# Patient Record
Sex: FEMALE | Race: BLACK OR AFRICAN AMERICAN | NOT HISPANIC OR LATINO
[De-identification: names, ages, dates, MRNs, and addresses within clinical notes are randomized per-mention and may not be internally consistent; named-entity substitution may affect disease eponyms.]

---

## 2017-01-11 ENCOUNTER — APPOINTMENT (OUTPATIENT)
Dept: INTERNAL MEDICINE | Facility: CLINIC | Age: 82
End: 2017-01-11

## 2017-01-19 ENCOUNTER — APPOINTMENT (OUTPATIENT)
Dept: OTOLARYNGOLOGY | Facility: CLINIC | Age: 82
End: 2017-01-19

## 2017-01-19 VITALS
BODY MASS INDEX: 24.25 KG/M2 | HEIGHT: 60 IN | WEIGHT: 123.5 LBS | HEART RATE: 76 BPM | SYSTOLIC BLOOD PRESSURE: 149 MMHG | DIASTOLIC BLOOD PRESSURE: 74 MMHG

## 2017-01-19 DIAGNOSIS — J31.0 CHRONIC RHINITIS: ICD-10-CM

## 2017-01-27 ENCOUNTER — APPOINTMENT (OUTPATIENT)
Dept: INTERNAL MEDICINE | Facility: CLINIC | Age: 82
End: 2017-01-27

## 2017-02-10 ENCOUNTER — MEDICATION RENEWAL (OUTPATIENT)
Age: 82
End: 2017-02-10

## 2017-03-06 ENCOUNTER — APPOINTMENT (OUTPATIENT)
Dept: INTERNAL MEDICINE | Facility: CLINIC | Age: 82
End: 2017-03-06

## 2017-03-15 ENCOUNTER — MEDICATION RENEWAL (OUTPATIENT)
Age: 82
End: 2017-03-15

## 2017-04-12 ENCOUNTER — MEDICATION RENEWAL (OUTPATIENT)
Age: 82
End: 2017-04-12

## 2017-05-08 ENCOUNTER — MEDICATION RENEWAL (OUTPATIENT)
Age: 82
End: 2017-05-08

## 2017-05-31 ENCOUNTER — APPOINTMENT (OUTPATIENT)
Dept: INTERNAL MEDICINE | Facility: CLINIC | Age: 82
End: 2017-05-31

## 2017-05-31 DIAGNOSIS — M31.6 OTHER GIANT CELL ARTERITIS: ICD-10-CM

## 2017-06-06 ENCOUNTER — MEDICATION RENEWAL (OUTPATIENT)
Age: 82
End: 2017-06-06

## 2017-07-05 ENCOUNTER — APPOINTMENT (OUTPATIENT)
Dept: INTERNAL MEDICINE | Facility: CLINIC | Age: 82
End: 2017-07-05

## 2017-07-05 ENCOUNTER — MEDICATION RENEWAL (OUTPATIENT)
Age: 82
End: 2017-07-05

## 2017-07-14 ENCOUNTER — MEDICATION RENEWAL (OUTPATIENT)
Age: 82
End: 2017-07-14

## 2017-08-11 ENCOUNTER — MEDICATION RENEWAL (OUTPATIENT)
Age: 82
End: 2017-08-11

## 2017-09-11 ENCOUNTER — RX RENEWAL (OUTPATIENT)
Age: 82
End: 2017-09-11

## 2017-09-11 ENCOUNTER — MEDICATION RENEWAL (OUTPATIENT)
Age: 82
End: 2017-09-11

## 2017-09-12 ENCOUNTER — RX RENEWAL (OUTPATIENT)
Age: 82
End: 2017-09-12

## 2017-09-27 ENCOUNTER — LABORATORY RESULT (OUTPATIENT)
Age: 82
End: 2017-09-27

## 2017-09-27 ENCOUNTER — APPOINTMENT (OUTPATIENT)
Dept: INTERNAL MEDICINE | Facility: CLINIC | Age: 82
End: 2017-09-27
Payer: MEDICARE

## 2017-09-27 VITALS — BODY MASS INDEX: 24.94 KG/M2 | HEIGHT: 60 IN | WEIGHT: 127 LBS

## 2017-09-27 VITALS — DIASTOLIC BLOOD PRESSURE: 80 MMHG | SYSTOLIC BLOOD PRESSURE: 138 MMHG

## 2017-09-27 PROCEDURE — 99214 OFFICE O/P EST MOD 30 MIN: CPT | Mod: 25

## 2017-09-27 PROCEDURE — 36415 COLL VENOUS BLD VENIPUNCTURE: CPT

## 2017-09-28 LAB
25(OH)D3 SERPL-MCNC: 41.4 NG/ML
ALBUMIN SERPL ELPH-MCNC: 4.2 G/DL
ALP BLD-CCNC: 54 U/L
ALT SERPL-CCNC: 13 U/L
ANION GAP SERPL CALC-SCNC: 18 MMOL/L
AST SERPL-CCNC: 28 U/L
BASOPHILS # BLD AUTO: 0.02 K/UL
BASOPHILS NFR BLD AUTO: 0.3 %
BILIRUB SERPL-MCNC: 0.3 MG/DL
BUN SERPL-MCNC: 23 MG/DL
CALCIUM SERPL-MCNC: 9.5 MG/DL
CHLORIDE SERPL-SCNC: 99 MMOL/L
CHOLEST SERPL-MCNC: 166 MG/DL
CHOLEST/HDLC SERPL: 2.1 RATIO
CO2 SERPL-SCNC: 24 MMOL/L
CREAT SERPL-MCNC: 0.89 MG/DL
EOSINOPHIL # BLD AUTO: 0.11 K/UL
EOSINOPHIL NFR BLD AUTO: 1.6 %
GLUCOSE SERPL-MCNC: 92 MG/DL
HBA1C MFR BLD HPLC: 5.5 %
HCT VFR BLD CALC: 39.9 %
HDLC SERPL-MCNC: 79 MG/DL
HGB BLD-MCNC: 12.7 G/DL
IMM GRANULOCYTES NFR BLD AUTO: 0.1 %
LDLC SERPL CALC-MCNC: 68 MG/DL
LYMPHOCYTES # BLD AUTO: 1.5 K/UL
LYMPHOCYTES NFR BLD AUTO: 21.7 %
MAN DIFF?: NORMAL
MCHC RBC-ENTMCNC: 30.4 PG
MCHC RBC-ENTMCNC: 31.8 GM/DL
MCV RBC AUTO: 95.5 FL
MONOCYTES # BLD AUTO: 0.56 K/UL
MONOCYTES NFR BLD AUTO: 8.1 %
NEUTROPHILS # BLD AUTO: 4.72 K/UL
NEUTROPHILS NFR BLD AUTO: 68.2 %
PLATELET # BLD AUTO: 226 K/UL
POTASSIUM SERPL-SCNC: 4.2 MMOL/L
PROT SERPL-MCNC: 7.4 G/DL
RBC # BLD: 4.18 M/UL
RBC # FLD: 14.7 %
SAVE SPECIMEN: NORMAL
SODIUM SERPL-SCNC: 141 MMOL/L
T3RU NFR SERPL: 1.09 INDEX
T4 SERPL-MCNC: 6 UG/DL
TRIGL SERPL-MCNC: 97 MG/DL
TSH SERPL-ACNC: 4.34 UIU/ML
URATE SERPL-MCNC: 4.2 MG/DL
VIT B12 SERPL-MCNC: 434 PG/ML
WBC # FLD AUTO: 6.92 K/UL

## 2017-10-09 ENCOUNTER — MEDICATION RENEWAL (OUTPATIENT)
Age: 82
End: 2017-10-09

## 2017-10-13 ENCOUNTER — MEDICATION RENEWAL (OUTPATIENT)
Age: 82
End: 2017-10-13

## 2017-11-01 ENCOUNTER — APPOINTMENT (OUTPATIENT)
Dept: INTERNAL MEDICINE | Facility: CLINIC | Age: 82
End: 2017-11-01

## 2017-11-07 ENCOUNTER — MEDICATION RENEWAL (OUTPATIENT)
Age: 82
End: 2017-11-07

## 2017-11-07 ENCOUNTER — APPOINTMENT (OUTPATIENT)
Dept: INTERNAL MEDICINE | Facility: CLINIC | Age: 82
End: 2017-11-07
Payer: MEDICARE

## 2017-11-07 VITALS
DIASTOLIC BLOOD PRESSURE: 80 MMHG | HEIGHT: 60 IN | SYSTOLIC BLOOD PRESSURE: 138 MMHG | WEIGHT: 129 LBS | BODY MASS INDEX: 25.32 KG/M2

## 2017-11-07 VITALS — SYSTOLIC BLOOD PRESSURE: 138 MMHG | DIASTOLIC BLOOD PRESSURE: 88 MMHG

## 2017-11-07 PROCEDURE — 99214 OFFICE O/P EST MOD 30 MIN: CPT | Mod: 25

## 2017-11-07 PROCEDURE — G0008: CPT

## 2017-11-07 PROCEDURE — 90686 IIV4 VACC NO PRSV 0.5 ML IM: CPT

## 2017-12-15 ENCOUNTER — MEDICATION RENEWAL (OUTPATIENT)
Age: 82
End: 2017-12-15

## 2018-01-19 ENCOUNTER — MEDICATION RENEWAL (OUTPATIENT)
Age: 83
End: 2018-01-19

## 2018-02-20 ENCOUNTER — MEDICATION RENEWAL (OUTPATIENT)
Age: 83
End: 2018-02-20

## 2018-03-16 ENCOUNTER — MEDICATION RENEWAL (OUTPATIENT)
Age: 83
End: 2018-03-16

## 2018-05-15 ENCOUNTER — MEDICATION RENEWAL (OUTPATIENT)
Age: 83
End: 2018-05-15

## 2018-06-14 ENCOUNTER — MEDICATION RENEWAL (OUTPATIENT)
Age: 83
End: 2018-06-14

## 2018-06-15 ENCOUNTER — MEDICATION RENEWAL (OUTPATIENT)
Age: 83
End: 2018-06-15

## 2018-06-26 ENCOUNTER — MEDICATION RENEWAL (OUTPATIENT)
Age: 83
End: 2018-06-26

## 2018-07-23 ENCOUNTER — MEDICATION RENEWAL (OUTPATIENT)
Age: 83
End: 2018-07-23

## 2018-08-21 ENCOUNTER — MEDICATION RENEWAL (OUTPATIENT)
Age: 83
End: 2018-08-21

## 2018-08-31 ENCOUNTER — MEDICATION RENEWAL (OUTPATIENT)
Age: 83
End: 2018-08-31

## 2018-09-10 ENCOUNTER — RX RENEWAL (OUTPATIENT)
Age: 83
End: 2018-09-10

## 2018-09-18 ENCOUNTER — APPOINTMENT (OUTPATIENT)
Dept: INTERNAL MEDICINE | Facility: CLINIC | Age: 83
End: 2018-09-18

## 2018-10-03 ENCOUNTER — MEDICATION RENEWAL (OUTPATIENT)
Age: 83
End: 2018-10-03

## 2018-10-10 ENCOUNTER — MEDICATION RENEWAL (OUTPATIENT)
Age: 83
End: 2018-10-10

## 2018-11-20 ENCOUNTER — MEDICATION RENEWAL (OUTPATIENT)
Age: 83
End: 2018-11-20

## 2018-11-21 ENCOUNTER — APPOINTMENT (OUTPATIENT)
Dept: INTERNAL MEDICINE | Facility: CLINIC | Age: 83
End: 2018-11-21
Payer: MEDICARE

## 2018-11-21 ENCOUNTER — LABORATORY RESULT (OUTPATIENT)
Age: 83
End: 2018-11-21

## 2018-11-21 ENCOUNTER — NON-APPOINTMENT (OUTPATIENT)
Age: 83
End: 2018-11-21

## 2018-11-21 VITALS
BODY MASS INDEX: 24.35 KG/M2 | HEIGHT: 60 IN | SYSTOLIC BLOOD PRESSURE: 120 MMHG | DIASTOLIC BLOOD PRESSURE: 70 MMHG | WEIGHT: 124 LBS

## 2018-11-21 VITALS — SYSTOLIC BLOOD PRESSURE: 118 MMHG | DIASTOLIC BLOOD PRESSURE: 72 MMHG

## 2018-11-21 DIAGNOSIS — K21.9 GASTRO-ESOPHAGEAL REFLUX DISEASE W/OUT ESOPHAGITIS: ICD-10-CM

## 2018-11-21 PROCEDURE — 36415 COLL VENOUS BLD VENIPUNCTURE: CPT

## 2018-11-21 PROCEDURE — 99214 OFFICE O/P EST MOD 30 MIN: CPT | Mod: 25

## 2018-11-21 PROCEDURE — 93000 ELECTROCARDIOGRAM COMPLETE: CPT

## 2018-11-21 PROCEDURE — 90686 IIV4 VACC NO PRSV 0.5 ML IM: CPT

## 2018-11-21 PROCEDURE — G0008: CPT

## 2018-11-21 RX ORDER — MELOXICAM 7.5 MG/1
7.5 TABLET ORAL DAILY
Qty: 30 | Refills: 3 | Status: DISCONTINUED | COMMUNITY
End: 2018-11-21

## 2018-11-21 NOTE — HISTORY OF PRESENT ILLNESS
[FreeTextEntry1] : This is a 89-year-old female for evaluation of treatment of her hypertension reflux hypothyroidism cervical arthropathy depression [de-identified] : Patient comes in she is upbeat. She has no cardiovascular complaints her primary complaints are her neck pain which is chronic. She states this is associated with headaches. In addition she states that this seems to get better through the day\par \par She seems appropriate but she does forget details during our interview\par \par She does recall that she has fallen 6 times. She states that she tripped and there is no loss of consciousness. I did speak with the aid who states that she has not had a formal since September which is what the patient expressed

## 2018-11-21 NOTE — PHYSICAL EXAM
[No Acute Distress] : no acute distress [Well Nourished] : well nourished [Well Developed] : well developed [Normal Sclera/Conjunctiva] : normal sclera/conjunctiva [No JVD] : no jugular venous distention [Supple] : supple [No Lymphadenopathy] : no lymphadenopathy [No Respiratory Distress] : no respiratory distress  [Clear to Auscultation] : lungs were clear to auscultation bilaterally [No Accessory Muscle Use] : no accessory muscle use [Normal Rate] : normal rate  [Regular Rhythm] : with a regular rhythm [Soft] : abdomen soft [Non Tender] : non-tender [No HSM] : no HSM [No Focal Deficits] : no focal deficits [de-identified] : 2/6 systolic ejection murmur [de-identified] : no ecchymosis [de-identified] : memory loss but upbeat

## 2018-11-21 NOTE — REVIEW OF SYSTEMS
[Fatigue] : fatigue [Memory Loss] : memory loss [Depression] : depression [Negative] : Respiratory [Easy Bleeding] : no easy bleeding [Easy Bruising] : no easy bruising [FreeTextEntry7] : intermittent constipation and diarrhea [FreeTextEntry9] : joint pain in the knee that she fell on [de-identified] : h

## 2018-11-21 NOTE — ASSESSMENT
[FreeTextEntry1] : This is a 89-year-old female whose history has been reviewed above\par \par She has a history of hypertension blood pressure is well controlled she is no orthostasis no medication changes\par \par She has a history of reflux remains on PPI she is asymptomatic we will continue this medication\par \par She has a history of hypothyroidism she is on Synthroid her TSH has been obtained medication changes predicated on the results\par \par She has been on Xanax for anxiety. Although the need is meticulous in giving her the medication she may be overusing we will decrease her prescription.\par \par From a social standpoint her so wishes her to move to [place] to be with him she is resistant\par \par Her balance seems fairly good I have prescribed physical therapy however she rarely goes\par \par She remains on an antidepressant with good results

## 2018-11-22 LAB
25(OH)D3 SERPL-MCNC: 39.9 NG/ML
ALBUMIN SERPL ELPH-MCNC: 4.2 G/DL
ALP BLD-CCNC: 54 U/L
ALT SERPL-CCNC: 10 U/L
ANION GAP SERPL CALC-SCNC: 12 MMOL/L
AST SERPL-CCNC: 28 U/L
BASOPHILS # BLD AUTO: 0.02 K/UL
BASOPHILS NFR BLD AUTO: 0.3 %
BILIRUB SERPL-MCNC: 0.4 MG/DL
BUN SERPL-MCNC: 24 MG/DL
CALCIUM SERPL-MCNC: 9.8 MG/DL
CHLORIDE SERPL-SCNC: 103 MMOL/L
CHOLEST SERPL-MCNC: 156 MG/DL
CHOLEST/HDLC SERPL: 2.1 RATIO
CO2 SERPL-SCNC: 28 MMOL/L
CREAT SERPL-MCNC: 1.04 MG/DL
EOSINOPHIL # BLD AUTO: 0.12 K/UL
EOSINOPHIL NFR BLD AUTO: 2.1 %
GLUCOSE SERPL-MCNC: 82 MG/DL
HBA1C MFR BLD HPLC: 5.4 %
HCT VFR BLD CALC: 40.9 %
HDLC SERPL-MCNC: 73 MG/DL
HGB BLD-MCNC: 12.8 G/DL
IMM GRANULOCYTES NFR BLD AUTO: 0.2 %
LDLC SERPL CALC-MCNC: 71 MG/DL
LYMPHOCYTES # BLD AUTO: 1.34 K/UL
LYMPHOCYTES NFR BLD AUTO: 23.1 %
MAN DIFF?: NORMAL
MCHC RBC-ENTMCNC: 29.6 PG
MCHC RBC-ENTMCNC: 31.3 GM/DL
MCV RBC AUTO: 94.7 FL
MONOCYTES # BLD AUTO: 0.48 K/UL
MONOCYTES NFR BLD AUTO: 8.3 %
NEUTROPHILS # BLD AUTO: 3.84 K/UL
NEUTROPHILS NFR BLD AUTO: 66 %
PLATELET # BLD AUTO: 215 K/UL
POTASSIUM SERPL-SCNC: 4.4 MMOL/L
PROT SERPL-MCNC: 7.3 G/DL
RBC # BLD: 4.32 M/UL
RBC # FLD: 14.1 %
SAVE SPECIMEN: NORMAL
SODIUM SERPL-SCNC: 143 MMOL/L
T3RU NFR SERPL: 1.09 INDEX
T4 SERPL-MCNC: 6.5 UG/DL
TRIGL SERPL-MCNC: 60 MG/DL
TSH SERPL-ACNC: 5.04 UIU/ML
URATE SERPL-MCNC: 4.9 MG/DL
WBC # FLD AUTO: 5.81 K/UL

## 2018-12-19 ENCOUNTER — MEDICATION RENEWAL (OUTPATIENT)
Age: 83
End: 2018-12-19

## 2019-01-03 ENCOUNTER — MEDICATION RENEWAL (OUTPATIENT)
Age: 84
End: 2019-01-03

## 2019-02-14 ENCOUNTER — MEDICATION RENEWAL (OUTPATIENT)
Age: 84
End: 2019-02-14

## 2019-02-15 ENCOUNTER — MEDICATION RENEWAL (OUTPATIENT)
Age: 84
End: 2019-02-15

## 2019-02-20 ENCOUNTER — RX RENEWAL (OUTPATIENT)
Age: 84
End: 2019-02-20

## 2019-02-20 ENCOUNTER — MEDICATION RENEWAL (OUTPATIENT)
Age: 84
End: 2019-02-20

## 2019-03-17 ENCOUNTER — RX RENEWAL (OUTPATIENT)
Age: 84
End: 2019-03-17

## 2019-03-22 ENCOUNTER — MEDICATION RENEWAL (OUTPATIENT)
Age: 84
End: 2019-03-22

## 2019-03-22 ENCOUNTER — APPOINTMENT (OUTPATIENT)
Dept: INTERNAL MEDICINE | Facility: CLINIC | Age: 84
End: 2019-03-22
Payer: MEDICARE

## 2019-03-22 ENCOUNTER — NON-APPOINTMENT (OUTPATIENT)
Age: 84
End: 2019-03-22

## 2019-03-22 VITALS
WEIGHT: 124 LBS | SYSTOLIC BLOOD PRESSURE: 120 MMHG | HEIGHT: 62 IN | BODY MASS INDEX: 22.82 KG/M2 | DIASTOLIC BLOOD PRESSURE: 64 MMHG

## 2019-03-22 PROCEDURE — 36415 COLL VENOUS BLD VENIPUNCTURE: CPT

## 2019-03-22 PROCEDURE — 99214 OFFICE O/P EST MOD 30 MIN: CPT | Mod: 25

## 2019-03-22 PROCEDURE — 93000 ELECTROCARDIOGRAM COMPLETE: CPT

## 2019-03-22 NOTE — HISTORY OF PRESENT ILLNESS
[FreeTextEntry1] : This is a 89-year-old female for evaluation and treatment of her hypertension hypercholesterolemia depression chronic neck pain and balance disorder [de-identified] : Patient is remarkably alert. She is complaining of the loss of her contemporaries. She has continued neck pain. In addition she has complained of having low blood pressure but no eric she walks with the aide and a cane

## 2019-03-22 NOTE — ASSESSMENT
[FreeTextEntry1] : This is a delightful 89-year-old female whose history has been reviewed above\par \par She has a history of hypertension her blood pressure at home they have been a bit low but she is normotensive here without any orthostasis we will continue current medications\par \par She has a history of hypercholesterolemia she remains on a statin and cholesterol profiles we'll obtain medication changes per the results\par \par She has a history of hypothyroidism she is clinically euthyroid TSH and obtain medication changes predicated on the results\par \par She has a history of depression sure means of an SSRI with good results. Appropriate blood tests have been drawn\par \par  according to her aide she gets pain in the mid afternoon in her abdomen. We will try a PPI  She does take meloxicam \par \par   in terms of her diarrhea we will decrease her Ensure

## 2019-03-22 NOTE — PHYSICAL EXAM
[No Acute Distress] : no acute distress [Well Nourished] : well nourished [Well Developed] : well developed [No JVD] : no jugular venous distention [Supple] : supple [No Respiratory Distress] : no respiratory distress  [Clear to Auscultation] : lungs were clear to auscultation bilaterally [No Accessory Muscle Use] : no accessory muscle use [Normal Rate] : normal rate  [Regular Rhythm] : with a regular rhythm [Normal S1, S2] : normal S1 and S2 [Soft] : abdomen soft [Non Tender] : non-tender [Coordination Grossly Intact] : coordination grossly intact [No Focal Deficits] : no focal deficits [de-identified] : difficulty with movement of her neck [de-identified] : Poor gait but this is mechanical [de-identified] : Seems upbeat

## 2019-03-22 NOTE — REVIEW OF SYSTEMS
[Diarrhea] : diarrhea [Joint Pain] : joint pain [Depression] : depression [Negative] : Respiratory [FreeTextEntry5] : Low blood pressure [de-identified] : mild OMS

## 2019-03-23 LAB
BUN SERPL-MCNC: 19 MG/DL
CHLORIDE SERPL-SCNC: 104 MMOL/L
CO2 SERPL-SCNC: 23 MMOL/L
CREAT SERPL-MCNC: 0.91 MG/DL
POTASSIUM SERPL-SCNC: 4.5 MMOL/L
SODIUM SERPL-SCNC: 142 MMOL/L

## 2019-03-25 DIAGNOSIS — E78.00 PURE HYPERCHOLESTEROLEMIA, UNSPECIFIED: ICD-10-CM

## 2019-04-01 DIAGNOSIS — I10 ESSENTIAL (PRIMARY) HYPERTENSION: ICD-10-CM

## 2019-04-03 ENCOUNTER — LABORATORY RESULT (OUTPATIENT)
Age: 84
End: 2019-04-03

## 2019-04-04 ENCOUNTER — MEDICATION RENEWAL (OUTPATIENT)
Age: 84
End: 2019-04-04

## 2019-04-04 LAB
25(OH)D3 SERPL-MCNC: 42.3 NG/ML
ALBUMIN SERPL ELPH-MCNC: 4.3 G/DL
ALP BLD-CCNC: 52 U/L
ALT SERPL-CCNC: 11 U/L
ANION GAP SERPL CALC-SCNC: 15 MMOL/L
APPEARANCE: CLEAR
AST SERPL-CCNC: 22 U/L
BACTERIA: NEGATIVE
BASOPHILS # BLD AUTO: 0.04 K/UL
BASOPHILS NFR BLD AUTO: 0.6 %
BILIRUB SERPL-MCNC: 0.4 MG/DL
BILIRUBIN URINE: NEGATIVE
BLOOD URINE: NEGATIVE
BUN SERPL-MCNC: 10 MG/DL
CALCIUM OXALATE CRYSTALS: ABNORMAL
CALCIUM SERPL-MCNC: 9.5 MG/DL
CHLORIDE SERPL-SCNC: 104 MMOL/L
CHOLEST SERPL-MCNC: 155 MG/DL
CHOLEST/HDLC SERPL: 2.1 RATIO
CO2 SERPL-SCNC: 23 MMOL/L
COLOR: YELLOW
CREAT SERPL-MCNC: 0.8 MG/DL
EOSINOPHIL # BLD AUTO: 0.2 K/UL
EOSINOPHIL NFR BLD AUTO: 2.9 %
ESTIMATED AVERAGE GLUCOSE: 105 MG/DL
GLUCOSE QUALITATIVE U: NEGATIVE
GLUCOSE SERPL-MCNC: 51 MG/DL
HBA1C MFR BLD HPLC: 5.3 %
HCT VFR BLD CALC: 43.2 %
HDLC SERPL-MCNC: 75 MG/DL
HGB BLD-MCNC: 13.1 G/DL
HYALINE CASTS: 2 /LPF
IMM GRANULOCYTES NFR BLD AUTO: 0.4 %
KETONES URINE: NEGATIVE
LDLC SERPL CALC-MCNC: 64 MG/DL
LEUKOCYTE ESTERASE URINE: ABNORMAL
LYMPHOCYTES # BLD AUTO: 2.26 K/UL
LYMPHOCYTES NFR BLD AUTO: 32.6 %
MAN DIFF?: NORMAL
MCHC RBC-ENTMCNC: 29.5 PG
MCHC RBC-ENTMCNC: 30.3 GM/DL
MCV RBC AUTO: 97.3 FL
MICROSCOPIC-UA: NORMAL
MONOCYTES # BLD AUTO: 0.67 K/UL
MONOCYTES NFR BLD AUTO: 9.7 %
NEUTROPHILS # BLD AUTO: 3.73 K/UL
NEUTROPHILS NFR BLD AUTO: 53.8 %
NITRITE URINE: NEGATIVE
PH URINE: 6
PLATELET # BLD AUTO: 198 K/UL
POTASSIUM SERPL-SCNC: 3.6 MMOL/L
PROT SERPL-MCNC: 6.6 G/DL
PROTEIN URINE: NEGATIVE
RBC # BLD: 4.44 M/UL
RBC # FLD: 13.7 %
RED BLOOD CELLS URINE: 1 /HPF
SAVE SPECIMEN: NORMAL
SODIUM SERPL-SCNC: 142 MMOL/L
SPECIFIC GRAVITY URINE: 1.01
SQUAMOUS EPITHELIAL CELLS: 3 /HPF
T3RU NFR SERPL: 1.1 TBI
T4 SERPL-MCNC: 6.1 UG/DL
TRIGL SERPL-MCNC: 81 MG/DL
TSH SERPL-ACNC: 5.24 UIU/ML
URATE SERPL-MCNC: 3.9 MG/DL
URINE COMMENTS: NORMAL
UROBILINOGEN URINE: NORMAL
WBC # FLD AUTO: 6.93 K/UL
WHITE BLOOD CELLS URINE: 3 /HPF

## 2019-05-03 ENCOUNTER — APPOINTMENT (OUTPATIENT)
Dept: INTERNAL MEDICINE | Facility: CLINIC | Age: 84
End: 2019-05-03

## 2019-05-20 ENCOUNTER — MEDICATION RENEWAL (OUTPATIENT)
Age: 84
End: 2019-05-20

## 2019-05-21 ENCOUNTER — APPOINTMENT (OUTPATIENT)
Dept: INTERNAL MEDICINE | Facility: CLINIC | Age: 84
End: 2019-05-21

## 2019-05-22 ENCOUNTER — MEDICATION RENEWAL (OUTPATIENT)
Age: 84
End: 2019-05-22

## 2019-06-07 ENCOUNTER — RX RENEWAL (OUTPATIENT)
Age: 84
End: 2019-06-07

## 2019-06-14 ENCOUNTER — RX RENEWAL (OUTPATIENT)
Age: 84
End: 2019-06-14

## 2019-06-17 ENCOUNTER — MEDICATION RENEWAL (OUTPATIENT)
Age: 84
End: 2019-06-17

## 2019-06-24 ENCOUNTER — MEDICATION RENEWAL (OUTPATIENT)
Age: 84
End: 2019-06-24

## 2019-06-25 ENCOUNTER — RECORD ABSTRACTING (OUTPATIENT)
Age: 84
End: 2019-06-25

## 2019-06-25 ENCOUNTER — MEDICATION RENEWAL (OUTPATIENT)
Age: 84
End: 2019-06-25

## 2019-06-25 RX ORDER — ASPIRIN 81 MG
81 TABLET, DELAYED RELEASE (ENTERIC COATED) ORAL DAILY
Refills: 3 | Status: ACTIVE | COMMUNITY

## 2019-07-08 ENCOUNTER — RX RENEWAL (OUTPATIENT)
Age: 84
End: 2019-07-08

## 2019-07-17 ENCOUNTER — RX RENEWAL (OUTPATIENT)
Age: 84
End: 2019-07-17

## 2019-07-17 ENCOUNTER — MEDICATION RENEWAL (OUTPATIENT)
Age: 84
End: 2019-07-17

## 2019-07-26 ENCOUNTER — RX RENEWAL (OUTPATIENT)
Age: 84
End: 2019-07-26

## 2019-08-15 ENCOUNTER — MED ADMIN CHARGE (OUTPATIENT)
Age: 84
End: 2019-08-15

## 2019-09-09 ENCOUNTER — RX RENEWAL (OUTPATIENT)
Age: 84
End: 2019-09-09

## 2019-09-09 ENCOUNTER — MEDICATION RENEWAL (OUTPATIENT)
Age: 84
End: 2019-09-09

## 2019-10-08 ENCOUNTER — RX RENEWAL (OUTPATIENT)
Age: 84
End: 2019-10-08

## 2019-10-08 ENCOUNTER — MEDICATION RENEWAL (OUTPATIENT)
Age: 84
End: 2019-10-08

## 2019-11-08 ENCOUNTER — MEDICATION RENEWAL (OUTPATIENT)
Age: 84
End: 2019-11-08

## 2019-12-05 ENCOUNTER — RX RENEWAL (OUTPATIENT)
Age: 84
End: 2019-12-05

## 2019-12-30 ENCOUNTER — RX RENEWAL (OUTPATIENT)
Age: 84
End: 2019-12-30

## 2020-01-04 ENCOUNTER — RX RENEWAL (OUTPATIENT)
Age: 85
End: 2020-01-04

## 2020-02-04 ENCOUNTER — RX RENEWAL (OUTPATIENT)
Age: 85
End: 2020-02-04

## 2020-02-05 ENCOUNTER — RX RENEWAL (OUTPATIENT)
Age: 85
End: 2020-02-05

## 2020-02-12 ENCOUNTER — RX RENEWAL (OUTPATIENT)
Age: 85
End: 2020-02-12

## 2020-03-02 ENCOUNTER — RX RENEWAL (OUTPATIENT)
Age: 85
End: 2020-03-02

## 2020-03-09 ENCOUNTER — RX RENEWAL (OUTPATIENT)
Age: 85
End: 2020-03-09

## 2020-03-23 DIAGNOSIS — M79.659 PAIN IN UNSPECIFIED THIGH: ICD-10-CM

## 2020-03-27 ENCOUNTER — RX RENEWAL (OUTPATIENT)
Age: 85
End: 2020-03-27

## 2020-04-08 ENCOUNTER — RX RENEWAL (OUTPATIENT)
Age: 85
End: 2020-04-08

## 2020-04-28 ENCOUNTER — RX RENEWAL (OUTPATIENT)
Age: 85
End: 2020-04-28

## 2020-05-13 ENCOUNTER — RX RENEWAL (OUTPATIENT)
Age: 85
End: 2020-05-13

## 2020-05-20 ENCOUNTER — APPOINTMENT (OUTPATIENT)
Dept: INTERNAL MEDICINE | Facility: CLINIC | Age: 85
End: 2020-05-20
Payer: MEDICARE

## 2020-05-20 VITALS
SYSTOLIC BLOOD PRESSURE: 121 MMHG | BODY MASS INDEX: 23 KG/M2 | HEIGHT: 62 IN | HEART RATE: 75 BPM | TEMPERATURE: 98 F | WEIGHT: 125 LBS | DIASTOLIC BLOOD PRESSURE: 49 MMHG

## 2020-05-20 DIAGNOSIS — M19.90 UNSPECIFIED OSTEOARTHRITIS, UNSPECIFIED SITE: ICD-10-CM

## 2020-05-20 DIAGNOSIS — G47.00 INSOMNIA, UNSPECIFIED: ICD-10-CM

## 2020-05-20 DIAGNOSIS — C50.919 MALIGNANT NEOPLASM OF UNSPECIFIED SITE OF UNSPECIFIED FEMALE BREAST: ICD-10-CM

## 2020-05-20 DIAGNOSIS — R09.89 OTHER SPECIFIED SYMPTOMS AND SIGNS INVOLVING THE CIRCULATORY AND RESPIRATORY SYSTEMS: ICD-10-CM

## 2020-05-20 DIAGNOSIS — R26.89 OTHER ABNORMALITIES OF GAIT AND MOBILITY: ICD-10-CM

## 2020-05-20 DIAGNOSIS — Z00.00 ENCOUNTER FOR GENERAL ADULT MEDICAL EXAMINATION W/OUT ABNORMAL FINDINGS: ICD-10-CM

## 2020-05-20 PROCEDURE — 99442: CPT | Mod: 95

## 2020-05-20 RX ORDER — QUETIAPINE FUMARATE 25 MG/1
25 TABLET ORAL
Qty: 60 | Refills: 3 | Status: DISCONTINUED | COMMUNITY
Start: 2019-06-24 | End: 2020-05-20

## 2020-05-20 RX ORDER — ZOLPIDEM TARTRATE 5 MG/1
5 TABLET ORAL
Qty: 30 | Refills: 3 | Status: ACTIVE | COMMUNITY
Start: 2020-05-20 | End: 1900-01-01

## 2020-05-20 RX ORDER — CEPHALEXIN 250 MG/1
250 TABLET ORAL EVERY 6 HOURS
Qty: 28 | Refills: 0 | Status: DISCONTINUED | COMMUNITY
Start: 2020-03-23 | End: 2020-05-20

## 2020-06-01 ENCOUNTER — RX RENEWAL (OUTPATIENT)
Age: 85
End: 2020-06-01

## 2020-06-01 RX ORDER — NITROFURANTOIN MACROCRYSTALS 100 MG/1
100 CAPSULE ORAL
Qty: 14 | Refills: 0 | Status: ACTIVE | COMMUNITY
Start: 2020-06-01 | End: 1900-01-01

## 2020-06-05 ENCOUNTER — APPOINTMENT (OUTPATIENT)
Dept: INTERNAL MEDICINE | Facility: CLINIC | Age: 85
End: 2020-06-05
Payer: MEDICARE

## 2020-06-05 VITALS
BODY MASS INDEX: 23 KG/M2 | TEMPERATURE: 97 F | SYSTOLIC BLOOD PRESSURE: 132 MMHG | DIASTOLIC BLOOD PRESSURE: 54 MMHG | HEART RATE: 69 BPM | WEIGHT: 125 LBS | HEIGHT: 62 IN

## 2020-06-05 DIAGNOSIS — R35.0 FREQUENCY OF MICTURITION: ICD-10-CM

## 2020-06-05 DIAGNOSIS — F32.9 MAJOR DEPRESSIVE DISORDER, SINGLE EPISODE, UNSPECIFIED: ICD-10-CM

## 2020-06-05 PROCEDURE — 99442: CPT | Mod: 95

## 2020-06-08 PROBLEM — R35.0 FREQUENCY OF URINATION: Status: ACTIVE | Noted: 2020-06-01

## 2020-06-11 ENCOUNTER — RX RENEWAL (OUTPATIENT)
Age: 85
End: 2020-06-11

## 2020-07-03 ENCOUNTER — RX RENEWAL (OUTPATIENT)
Age: 85
End: 2020-07-03

## 2020-07-21 ENCOUNTER — RX RENEWAL (OUTPATIENT)
Age: 85
End: 2020-07-21

## 2020-08-04 ENCOUNTER — RX RENEWAL (OUTPATIENT)
Age: 85
End: 2020-08-04

## 2020-08-24 ENCOUNTER — RX RENEWAL (OUTPATIENT)
Age: 85
End: 2020-08-24

## 2020-08-31 ENCOUNTER — RX RENEWAL (OUTPATIENT)
Age: 85
End: 2020-08-31

## 2020-09-03 ENCOUNTER — RX RENEWAL (OUTPATIENT)
Age: 85
End: 2020-09-03

## 2020-09-22 ENCOUNTER — RX RENEWAL (OUTPATIENT)
Age: 85
End: 2020-09-22

## 2020-10-19 ENCOUNTER — RX RENEWAL (OUTPATIENT)
Age: 85
End: 2020-10-19

## 2020-10-26 ENCOUNTER — RX RENEWAL (OUTPATIENT)
Age: 85
End: 2020-10-26

## 2020-11-17 ENCOUNTER — RX RENEWAL (OUTPATIENT)
Age: 85
End: 2020-11-17

## 2020-11-23 ENCOUNTER — RX RENEWAL (OUTPATIENT)
Age: 85
End: 2020-11-23

## 2020-11-25 ENCOUNTER — RX RENEWAL (OUTPATIENT)
Age: 85
End: 2020-11-25

## 2020-12-07 RX ORDER — MONTELUKAST 10 MG/1
10 TABLET, FILM COATED ORAL DAILY
Qty: 90 | Refills: 0 | Status: ACTIVE | COMMUNITY
Start: 2020-12-07 | End: 1900-01-01

## 2020-12-24 ENCOUNTER — RX RENEWAL (OUTPATIENT)
Age: 85
End: 2020-12-24

## 2021-01-06 ENCOUNTER — RX RENEWAL (OUTPATIENT)
Age: 86
End: 2021-01-06

## 2021-01-25 ENCOUNTER — RX RENEWAL (OUTPATIENT)
Age: 86
End: 2021-01-25

## 2021-01-29 ENCOUNTER — RX RENEWAL (OUTPATIENT)
Age: 86
End: 2021-01-29

## 2021-02-24 ENCOUNTER — RX RENEWAL (OUTPATIENT)
Age: 86
End: 2021-02-24

## 2021-03-08 ENCOUNTER — RX RENEWAL (OUTPATIENT)
Age: 86
End: 2021-03-08

## 2021-03-26 ENCOUNTER — RX RENEWAL (OUTPATIENT)
Age: 86
End: 2021-03-26

## 2021-04-22 ENCOUNTER — RX RENEWAL (OUTPATIENT)
Age: 86
End: 2021-04-22

## 2021-05-11 ENCOUNTER — RX RENEWAL (OUTPATIENT)
Age: 86
End: 2021-05-11

## 2021-05-21 ENCOUNTER — RX RENEWAL (OUTPATIENT)
Age: 86
End: 2021-05-21

## 2021-06-02 ENCOUNTER — RX RENEWAL (OUTPATIENT)
Age: 86
End: 2021-06-02

## 2021-06-27 ENCOUNTER — RX RENEWAL (OUTPATIENT)
Age: 86
End: 2021-06-27

## 2021-06-30 ENCOUNTER — RX RENEWAL (OUTPATIENT)
Age: 86
End: 2021-06-30

## 2021-07-02 ENCOUNTER — RX RENEWAL (OUTPATIENT)
Age: 86
End: 2021-07-02

## 2021-07-02 RX ORDER — MIRTAZAPINE 30 MG/1
30 TABLET, FILM COATED ORAL
Qty: 30 | Refills: 3 | Status: ACTIVE | COMMUNITY
Start: 2020-12-08 | End: 1900-01-01

## 2021-07-16 ENCOUNTER — RX RENEWAL (OUTPATIENT)
Age: 86
End: 2021-07-16

## 2021-08-05 ENCOUNTER — NON-APPOINTMENT (OUTPATIENT)
Age: 86
End: 2021-08-05

## 2021-08-12 ENCOUNTER — RX RENEWAL (OUTPATIENT)
Age: 86
End: 2021-08-12

## 2021-08-27 ENCOUNTER — RX RENEWAL (OUTPATIENT)
Age: 86
End: 2021-08-27

## 2021-09-27 ENCOUNTER — RX RENEWAL (OUTPATIENT)
Age: 86
End: 2021-09-27

## 2021-10-01 ENCOUNTER — RX RENEWAL (OUTPATIENT)
Age: 86
End: 2021-10-01

## 2021-10-01 RX ORDER — MELOXICAM 7.5 MG/1
7.5 TABLET ORAL DAILY
Qty: 30 | Refills: 2 | Status: ACTIVE | COMMUNITY
Start: 2018-11-21 | End: 1900-01-01

## 2021-10-04 RX ORDER — FLUTICASONE PROPIONATE 50 UG/1
50 SPRAY, METERED NASAL DAILY
Qty: 1 | Refills: 3 | Status: ACTIVE | COMMUNITY
Start: 2021-10-04 | End: 1900-01-01

## 2021-10-12 ENCOUNTER — NON-APPOINTMENT (OUTPATIENT)
Age: 86
End: 2021-10-12

## 2021-10-18 ENCOUNTER — RX RENEWAL (OUTPATIENT)
Age: 86
End: 2021-10-18

## 2021-10-18 RX ORDER — SERTRALINE HYDROCHLORIDE 100 MG/1
100 TABLET, FILM COATED ORAL TWICE DAILY
Qty: 180 | Refills: 0 | Status: ACTIVE | COMMUNITY
Start: 2019-07-26 | End: 1900-01-01

## 2021-10-28 ENCOUNTER — RX RENEWAL (OUTPATIENT)
Age: 86
End: 2021-10-28

## 2021-10-29 ENCOUNTER — RX RENEWAL (OUTPATIENT)
Age: 86
End: 2021-10-29

## 2021-10-29 RX ORDER — MIRTAZAPINE 15 MG/1
15 TABLET, FILM COATED ORAL
Qty: 30 | Refills: 1 | Status: ACTIVE | COMMUNITY
Start: 2021-08-12 | End: 1900-01-01

## 2021-11-02 ENCOUNTER — NON-APPOINTMENT (OUTPATIENT)
Age: 86
End: 2021-11-02

## 2021-11-02 DIAGNOSIS — R45.1 RESTLESSNESS AND AGITATION: ICD-10-CM

## 2021-11-02 RX ORDER — HALOPERIDOL 0.5 MG/1
0.5 TABLET ORAL 3 TIMES DAILY
Qty: 45 | Refills: 0 | Status: ACTIVE | COMMUNITY
Start: 2021-11-02 | End: 1900-01-01

## 2021-11-08 ENCOUNTER — RX RENEWAL (OUTPATIENT)
Age: 86
End: 2021-11-08

## 2021-11-23 ENCOUNTER — RX RENEWAL (OUTPATIENT)
Age: 86
End: 2021-11-23

## 2021-11-23 RX ORDER — PANTOPRAZOLE 40 MG/1
40 TABLET, DELAYED RELEASE ORAL DAILY
Qty: 90 | Refills: 0 | Status: ACTIVE | COMMUNITY
Start: 2019-03-22 | End: 1900-01-01

## 2021-11-29 ENCOUNTER — RX RENEWAL (OUTPATIENT)
Age: 86
End: 2021-11-29

## 2021-12-24 ENCOUNTER — INPATIENT (INPATIENT)
Facility: HOSPITAL | Age: 86
LOS: 23 days | Discharge: NURSING, REHAB OR LONG-TERM CARE | End: 2022-01-17
Attending: INTERNAL MEDICINE | Admitting: INTERNAL MEDICINE
Payer: MEDICARE

## 2021-12-24 VITALS
TEMPERATURE: 98 F | RESPIRATION RATE: 18 BRPM | SYSTOLIC BLOOD PRESSURE: 143 MMHG | OXYGEN SATURATION: 97 % | HEART RATE: 83 BPM | DIASTOLIC BLOOD PRESSURE: 65 MMHG

## 2021-12-24 DIAGNOSIS — R13.10 DYSPHAGIA, UNSPECIFIED: ICD-10-CM

## 2021-12-24 DIAGNOSIS — R41.0 DISORIENTATION, UNSPECIFIED: ICD-10-CM

## 2021-12-24 DIAGNOSIS — F03.90 UNSPECIFIED DEMENTIA WITHOUT BEHAVIORAL DISTURBANCE: ICD-10-CM

## 2021-12-24 DIAGNOSIS — K59.00 CONSTIPATION, UNSPECIFIED: ICD-10-CM
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
## 2021-12-24 DIAGNOSIS — H10.9 UNSPECIFIED CONJUNCTIVITIS: ICD-10-CM

## 2021-12-24 DIAGNOSIS — K61.0 ANAL ABSCESS: Chronic | ICD-10-CM

## 2021-12-24 DIAGNOSIS — E03.9 HYPOTHYROIDISM, UNSPECIFIED: ICD-10-CM

## 2021-12-24 DIAGNOSIS — Z29.9 ENCOUNTER FOR PROPHYLACTIC MEASURES, UNSPECIFIED: ICD-10-CM

## 2021-12-24 DIAGNOSIS — F32.9 MAJOR DEPRESSIVE DISORDER, SINGLE EPISODE, UNSPECIFIED: ICD-10-CM

## 2021-12-24 DIAGNOSIS — M31.4 AORTIC ARCH SYNDROME [TAKAYASU]: ICD-10-CM

## 2021-12-24 DIAGNOSIS — Z79.899 OTHER LONG TERM (CURRENT) DRUG THERAPY: ICD-10-CM

## 2021-12-24 DIAGNOSIS — C50.919 MALIGNANT NEOPLASM OF UNSPECIFIED SITE OF UNSPECIFIED FEMALE BREAST: ICD-10-CM

## 2021-12-24 DIAGNOSIS — E78.5 HYPERLIPIDEMIA, UNSPECIFIED: ICD-10-CM

## 2021-12-24 DIAGNOSIS — I10 ESSENTIAL (PRIMARY) HYPERTENSION: ICD-10-CM

## 2021-12-24 LAB
ALBUMIN SERPL ELPH-MCNC: 4.2 G/DL — SIGNIFICANT CHANGE UP (ref 3.3–5)
ALP SERPL-CCNC: 99 U/L — SIGNIFICANT CHANGE UP (ref 40–120)
ALT FLD-CCNC: 13 U/L — SIGNIFICANT CHANGE UP (ref 4–33)
ANION GAP SERPL CALC-SCNC: 17 MMOL/L — HIGH (ref 7–14)
AST SERPL-CCNC: 28 U/L — SIGNIFICANT CHANGE UP (ref 4–32)
B PERT DNA SPEC QL NAA+PROBE: SIGNIFICANT CHANGE UP
B PERT+PARAPERT DNA PNL SPEC NAA+PROBE: SIGNIFICANT CHANGE UP
BASOPHILS # BLD AUTO: 0.03 K/UL — SIGNIFICANT CHANGE UP (ref 0–0.2)
BASOPHILS NFR BLD AUTO: 0.4 % — SIGNIFICANT CHANGE UP (ref 0–2)
BILIRUB SERPL-MCNC: 0.5 MG/DL — SIGNIFICANT CHANGE UP (ref 0.2–1.2)
BORDETELLA PARAPERTUSSIS (RAPRVP): SIGNIFICANT CHANGE UP
BUN SERPL-MCNC: 20 MG/DL — SIGNIFICANT CHANGE UP (ref 7–23)
C PNEUM DNA SPEC QL NAA+PROBE: SIGNIFICANT CHANGE UP
CALCIUM SERPL-MCNC: 9.5 MG/DL — SIGNIFICANT CHANGE UP (ref 8.4–10.5)
CHLORIDE SERPL-SCNC: 102 MMOL/L — SIGNIFICANT CHANGE UP (ref 98–107)
CK SERPL-CCNC: 331 U/L — HIGH (ref 25–170)
CO2 SERPL-SCNC: 20 MMOL/L — LOW (ref 22–31)
CREAT SERPL-MCNC: 0.8 MG/DL — SIGNIFICANT CHANGE UP (ref 0.5–1.3)
EOSINOPHIL # BLD AUTO: 0.06 K/UL — SIGNIFICANT CHANGE UP (ref 0–0.5)
EOSINOPHIL NFR BLD AUTO: 0.8 % — SIGNIFICANT CHANGE UP (ref 0–6)
FLUAV SUBTYP SPEC NAA+PROBE: SIGNIFICANT CHANGE UP
FLUBV RNA SPEC QL NAA+PROBE: SIGNIFICANT CHANGE UP
GLUCOSE SERPL-MCNC: 110 MG/DL — HIGH (ref 70–99)
HADV DNA SPEC QL NAA+PROBE: SIGNIFICANT CHANGE UP
HCOV 229E RNA SPEC QL NAA+PROBE: SIGNIFICANT CHANGE UP
HCOV HKU1 RNA SPEC QL NAA+PROBE: SIGNIFICANT CHANGE UP
HCOV NL63 RNA SPEC QL NAA+PROBE: SIGNIFICANT CHANGE UP
HCOV OC43 RNA SPEC QL NAA+PROBE: SIGNIFICANT CHANGE UP
HCT VFR BLD CALC: 44.7 % — SIGNIFICANT CHANGE UP (ref 34.5–45)
HGB BLD-MCNC: 14.2 G/DL — SIGNIFICANT CHANGE UP (ref 11.5–15.5)
HMPV RNA SPEC QL NAA+PROBE: SIGNIFICANT CHANGE UP
HPIV1 RNA SPEC QL NAA+PROBE: SIGNIFICANT CHANGE UP
HPIV2 RNA SPEC QL NAA+PROBE: SIGNIFICANT CHANGE UP
HPIV3 RNA SPEC QL NAA+PROBE: SIGNIFICANT CHANGE UP
HPIV4 RNA SPEC QL NAA+PROBE: SIGNIFICANT CHANGE UP
IANC: 5.48 K/UL — SIGNIFICANT CHANGE UP (ref 1.5–8.5)
IMM GRANULOCYTES NFR BLD AUTO: 0.1 % — SIGNIFICANT CHANGE UP (ref 0–1.5)
LYMPHOCYTES # BLD AUTO: 1.23 K/UL — SIGNIFICANT CHANGE UP (ref 1–3.3)
LYMPHOCYTES # BLD AUTO: 16.6 % — SIGNIFICANT CHANGE UP (ref 13–44)
M PNEUMO DNA SPEC QL NAA+PROBE: SIGNIFICANT CHANGE UP
MCHC RBC-ENTMCNC: 29.3 PG — SIGNIFICANT CHANGE UP (ref 27–34)
MCHC RBC-ENTMCNC: 31.8 GM/DL — LOW (ref 32–36)
MCV RBC AUTO: 92.2 FL — SIGNIFICANT CHANGE UP (ref 80–100)
MONOCYTES # BLD AUTO: 0.59 K/UL — SIGNIFICANT CHANGE UP (ref 0–0.9)
MONOCYTES NFR BLD AUTO: 8 % — SIGNIFICANT CHANGE UP (ref 2–14)
NEUTROPHILS # BLD AUTO: 5.48 K/UL — SIGNIFICANT CHANGE UP (ref 1.8–7.4)
NEUTROPHILS NFR BLD AUTO: 74.1 % — SIGNIFICANT CHANGE UP (ref 43–77)
NRBC # BLD: 0 /100 WBCS — SIGNIFICANT CHANGE UP
NRBC # FLD: 0 K/UL — SIGNIFICANT CHANGE UP
PLATELET # BLD AUTO: 223 K/UL — SIGNIFICANT CHANGE UP (ref 150–400)
POTASSIUM SERPL-MCNC: 4 MMOL/L — SIGNIFICANT CHANGE UP (ref 3.5–5.3)
POTASSIUM SERPL-SCNC: 4 MMOL/L — SIGNIFICANT CHANGE UP (ref 3.5–5.3)
PROT SERPL-MCNC: 7.7 G/DL — SIGNIFICANT CHANGE UP (ref 6–8.3)
RAPID RVP RESULT: SIGNIFICANT CHANGE UP
RBC # BLD: 4.85 M/UL — SIGNIFICANT CHANGE UP (ref 3.8–5.2)
RBC # FLD: 13.3 % — SIGNIFICANT CHANGE UP (ref 10.3–14.5)
RSV RNA SPEC QL NAA+PROBE: SIGNIFICANT CHANGE UP
RV+EV RNA SPEC QL NAA+PROBE: SIGNIFICANT CHANGE UP
SARS-COV-2 RNA SPEC QL NAA+PROBE: SIGNIFICANT CHANGE UP
SODIUM SERPL-SCNC: 139 MMOL/L — SIGNIFICANT CHANGE UP (ref 135–145)
TROPONIN T, HIGH SENSITIVITY RESULT: 28 NG/L — SIGNIFICANT CHANGE UP
TSH SERPL-MCNC: 3.9 UIU/ML — SIGNIFICANT CHANGE UP (ref 0.27–4.2)
WBC # BLD: 7.4 K/UL — SIGNIFICANT CHANGE UP (ref 3.8–10.5)
WBC # FLD AUTO: 7.4 K/UL — SIGNIFICANT CHANGE UP (ref 3.8–10.5)

## 2021-12-24 PROCEDURE — 99223 1ST HOSP IP/OBS HIGH 75: CPT

## 2021-12-24 PROCEDURE — 70450 CT HEAD/BRAIN W/O DYE: CPT | Mod: 26,MA

## 2021-12-24 PROCEDURE — 72125 CT NECK SPINE W/O DYE: CPT | Mod: 26,MA

## 2021-12-24 PROCEDURE — 93010 ELECTROCARDIOGRAM REPORT: CPT | Mod: GC

## 2021-12-24 PROCEDURE — 71045 X-RAY EXAM CHEST 1 VIEW: CPT | Mod: 26

## 2021-12-24 PROCEDURE — 99285 EMERGENCY DEPT VISIT HI MDM: CPT | Mod: 25,GC

## 2021-12-24 RX ORDER — LEVOTHYROXINE SODIUM 125 MCG
50 TABLET ORAL DAILY
Refills: 0 | Status: DISCONTINUED | OUTPATIENT
Start: 2021-12-24 | End: 2021-12-24

## 2021-12-24 RX ORDER — PANTOPRAZOLE SODIUM 20 MG/1
40 TABLET, DELAYED RELEASE ORAL
Refills: 0 | Status: DISCONTINUED | OUTPATIENT
Start: 2021-12-24 | End: 2021-12-24

## 2021-12-24 RX ORDER — MIRTAZAPINE 45 MG/1
15 TABLET, ORALLY DISINTEGRATING ORAL AT BEDTIME
Refills: 0 | Status: DISCONTINUED | OUTPATIENT
Start: 2021-12-24 | End: 2021-12-24

## 2021-12-24 RX ORDER — AMLODIPINE BESYLATE 2.5 MG/1
5 TABLET ORAL DAILY
Refills: 0 | Status: DISCONTINUED | OUTPATIENT
Start: 2021-12-24 | End: 2021-12-24

## 2021-12-24 RX ORDER — SENNA PLUS 8.6 MG/1
2 TABLET ORAL AT BEDTIME
Refills: 0 | Status: DISCONTINUED | OUTPATIENT
Start: 2021-12-24 | End: 2021-12-24

## 2021-12-24 RX ORDER — OLANZAPINE 15 MG/1
1.25 TABLET, FILM COATED ORAL EVERY 6 HOURS
Refills: 0 | Status: DISCONTINUED | OUTPATIENT
Start: 2021-12-24 | End: 2021-12-25

## 2021-12-24 RX ORDER — OFLOXACIN 0.3 %
2 DROPS OPHTHALMIC (EYE) EVERY 4 HOURS
Refills: 0 | Status: DISCONTINUED | OUTPATIENT
Start: 2021-12-24 | End: 2021-12-25

## 2021-12-24 RX ORDER — ATORVASTATIN CALCIUM 80 MG/1
20 TABLET, FILM COATED ORAL AT BEDTIME
Refills: 0 | Status: DISCONTINUED | OUTPATIENT
Start: 2021-12-24 | End: 2021-12-24

## 2021-12-24 RX ORDER — LEVOTHYROXINE SODIUM 125 MCG
38 TABLET ORAL AT BEDTIME
Refills: 0 | Status: DISCONTINUED | OUTPATIENT
Start: 2021-12-25 | End: 2021-12-25

## 2021-12-24 RX ORDER — SODIUM CHLORIDE 9 MG/ML
1000 INJECTION INTRAMUSCULAR; INTRAVENOUS; SUBCUTANEOUS ONCE
Refills: 0 | Status: COMPLETED | OUTPATIENT
Start: 2021-12-24 | End: 2021-12-24

## 2021-12-24 RX ORDER — POLYETHYLENE GLYCOL 3350 17 G/17G
17 POWDER, FOR SOLUTION ORAL DAILY
Refills: 0 | Status: DISCONTINUED | OUTPATIENT
Start: 2021-12-24 | End: 2021-12-24

## 2021-12-24 RX ORDER — SERTRALINE 25 MG/1
100 TABLET, FILM COATED ORAL DAILY
Refills: 0 | Status: DISCONTINUED | OUTPATIENT
Start: 2021-12-24 | End: 2021-12-24

## 2021-12-24 RX ADMIN — SODIUM CHLORIDE 1000 MILLILITER(S): 9 INJECTION INTRAMUSCULAR; INTRAVENOUS; SUBCUTANEOUS at 13:15

## 2021-12-24 RX ADMIN — SODIUM CHLORIDE 1000 MILLILITER(S): 9 INJECTION INTRAMUSCULAR; INTRAVENOUS; SUBCUTANEOUS at 17:44

## 2021-12-24 NOTE — H&P ADULT - PROBLEM SELECTOR PLAN 2
Concern for worsening dementia per son, he is requesting input from geriatric psych  - Frequent Re-orientation  - Ensure proper sleep and wake cycle  - Avoid anticholinergics  - Monitor for aggressive behavior, consider psych consult regarding medication management Concern for worsening dementia per son, he is requesting input from geriatric psych  - Frequent Re-orientation  - Ensure proper sleep and wake cycle  - Avoid anticholinergics  - Monitor for aggressive behavior, consider psych consult regarding medication management  - Zyprexa 1.25mg IM q6h PRN for agitation, JLY=[id number] on EKG Concern for worsening dementia per son, he is requesting input from geriatric psych  Also c/f BDZ abuse and potential for w/d, symptom triggered CIWA w/ IVP Ativan for now  - Frequent Re-orientation  - Ensure proper sleep and wake cycle  - Avoid anticholinergics  - Monitor for aggressive behavior, consider psych consult regarding medication management  - Zyprexa 1.25mg IM q6h PRN for agitation, SWF=486mt on EKG Concern for worsening dementia with behavioral disturbances based on collateral provided by pt's son.  - CT head negative for acute pathology  - Pt failed bedside dysphagia screen. Strict NPO, including meds. F/u S&S eval.  - According to [facility] (Reference #: [id number]), pt has been regularly prescribed Xanax 0.5 mg qid PRN by her PCP and was last dispensed on 11/30/21. Unclear, however, how often pt has been taking the Xanax. Will hold Xanax for now until pt's Xanax use clarified. Will monitor for possible benzodiazepine withdrawal with symptom-triggered CIWA with IV Ativan for now.   - Frequent re-orientation  - Ensure proper sleep-wake cycle  - Avoid anticholinergics  - Currently, no indication for constant observation. Zyprexa 1.25mg IM q6h PRN for agitation, QTc 420 ms on admission EKG.  - Aspiration precautions  - [facility] consult to be called in AM regarding medication management

## 2021-12-24 NOTE — H&P ADULT - BACK EXAM
normal/normal shape/ROM intact/strength intact No CVA tenderness b/l/normal shape/ROM intact/strength intact

## 2021-12-24 NOTE — ED PROVIDER NOTE - PROGRESS NOTE DETAILS
Harvey Hassan DO PGY-1: CT c-spine negative for fx and pt has no midline spinal ttp. Will clear the collar.

## 2021-12-24 NOTE — H&P ADULT - PROBLEM SELECTOR PLAN 1
- Likely mechanical in nature, concern for worsening functional status in setting of dementia  - Mental status at baseline (A&Ox2), no focal neuro deficits  - CT head and C-spine negative, C-Collar cleared in ED  - Obtain XR of R shoulder (bruised noted)  - EKG showed ST depression in lateral leads I and aVL, WWT=015yl, hST 28, repeat cardiac enzymes and EKG in AM  - Check TTE  - Orthostatic vitals  - Neuro checks q4 hours  - Fall precautions, seizure precautions, PT eval, bed alarm - Likely mechanical in nature, concern for worsening functional status in setting of dementia  - CK elevated to 331, likely from fall, repeat in AM  - Check UA  - Mental status at baseline (A&Ox2), no focal neuro deficits  - CT head and C-spine negative, C-Collar cleared in ED  - Obtain XR of R shoulder (bruised noted)  - EKG showed ST depression in lateral leads I and aVL, [id number]=[id number], hST 28, repeat cardiac enzymes and EKG in AM  - Check TTE  - Orthostatic vitals  - Neuro checks q4 hours  - Fall precautions, seizure precautions, PT eval, bed alarm - Likely mechanical in nature, concern for worsening functional status in setting of dementia  - CK elevated to 331, likely from fall, repeat in AM  - Check UA  - Mental status at baseline (A&Ox2), no focal neuro deficits  - CT head and C-spine negative, C-Collar cleared in ED  - Obtain XR of R shoulder (bruise noted), R Hip XR as well  - EKG showed ST depression in lateral leads I and aVL, [id number]=382rr, hST 28, repeat cardiac enzymes and EKG in AM  - Check TTE  - Orthostatic vitals  - Neuro checks q4 hours  - Fall precautions, seizure precautions, PT eval, bed alarm - Likely mechanical in nature, concern for worsening functional status in setting of dementia  - CK elevated to 331, likely from fall, repeat in AM  - Check UA  - Mental status at baseline (A&Ox2), no focal neuro deficits  - CT head and C-spine negative, C-Collar cleared in ED  - Obtain XR of R shoulder (bruise noted to shoulder), obtain R Hip XR as well (pt endorsed brief right hip pain)  - EKG showed ST depression in lateral leads I and aVL, IQL=648wn, hST 28, repeat cardiac enzymes and EKG in AM  - Check TTE  - Orthostatic vitals  - Neuro checks q4 hours  - Fall precautions, seizure precautions, PT eval, bed alarm Pt with fall at home, likely mechanical in nature in setting of debility/worsening functional status 2/2 dementia  - CK elevated to 331, likely from fall, repeat in AM  - Check UA  - Mental status at baseline (A&Ox2), no focal neuro deficits  - CT head and C-spine negative for acute pathology, C-collar cleared in ED  - Obtain XR of R shoulder (bruise noted to shoulder), obtain XR of R Hip as well (pt endorsed brief right hip pain)  - EKG showed ST depression in lateral leads I and aVL, QTc 420 ms, hST 28, repeat EKG and cardiac enzymes in AM  - Check TTE  - Check orthostatics  - PT consult  - Neuro checks q4 hours  - Fall precautions, seizure precautions, bed alarm

## 2021-12-24 NOTE — H&P ADULT - ATTENDING COMMENTS
89 y/o F, PMH of dementia, HTN, HLD, Hypothyroidism, MDD, Constipation, Breast Cancer (s/p lymph node removal and radiation ~30 years ago), Takayasu Arteritis, admitted for fall, likely mechanical, and concern for worsening dementia. CTH and CT C-spine negative for acute pathology. F/u orthostatics and TTE, though low suspicion for syncope given that pt recalls details of the fall and denies LOC. Psych consult pending given concern for worsening dementia with behavioral disturbances. S&S eval ordered and pt strict NPO given failed dysphagia screen on admission.

## 2021-12-24 NOTE — H&P ADULT - MUSCULOSKELETAL COMMENTS
+Bruise to right lateral shoulder.  No pain on passive or active ROM of elbows, shoulders, hips, or knees Bruise to right lateral shoulder.  No pain on passive or active ROM of elbows, shoulders, hips, or knees b/l.

## 2021-12-24 NOTE — H&P ADULT - NSICDXPASTMEDICALHX_GEN_ALL_CORE_FT
PAST MEDICAL HISTORY:  Breast cancer S/p lymph node removal and radiation therapy ~30 years ago    Constipation     Dementia     HLD (hyperlipidemia)     HTN (hypertension)     Major depression     Takayasu's arteritis      PAST MEDICAL HISTORY:  Breast cancer S/p lymph node removal and radiation therapy ~30 years ago    Constipation     Dementia     HLD (hyperlipidemia)     HTN (hypertension)     Hypothyroid     Major depression     Takayasu's arteritis

## 2021-12-24 NOTE — H&P ADULT - RS GEN PE MLT RESP DETAILS PC
normal/airway patent/breath sounds equal/good air movement/respirations non-labored/clear to auscultation bilaterally/no chest wall tenderness/no intercostal retractions/no rales/no rhonchi/no wheezes airway patent/breath sounds equal/good air movement/respirations non-labored/clear to auscultation bilaterally/no chest wall tenderness/no intercostal retractions/no rales/no rhonchi/no wheezes

## 2021-12-24 NOTE — H&P ADULT - ASSESSMENT
89 y/o F, PMH of dementia, HTN, HLD, Hypothyroidism, MDD, constipation, Breast Cancer (s/p lymph node removal and radiation ~30 years ago), Takayasu Arteritis, admitted for fall and concern for worsening dementia. 89 y/o F, PMH of dementia, HTN, HLD, Hypothyroidism, MDD, Constipation, Breast Cancer (s/p lymph node removal and radiation ~30 years ago), Takayasu Arteritis, admitted for fall and concern for worsening dementia.

## 2021-12-24 NOTE — H&P ADULT - PROBLEM SELECTOR PLAN 4
- Continue Home BP meds w/ parameters   - monitor BP   - DASH/TLC diet (will order minced diet due to pt's poor dentition) - Continue Home BP meds w/ parameters   - Monitor BP   - Ordered dysphagia screen, if pt passes, will orderDASH/TLC diet (will order minced diet due to pt's poor dentition) - Failed bedside dysphagia screening  - Strict NPO, S+S ordered  - Hold all PO meds, convert to IV equivalents if available - Given pt failed bedside dysphagia screen, oral statin on hold for now  - F/u lipid profile

## 2021-12-24 NOTE — H&P ADULT - MUSCULOSKELETAL
detailed exam normal/ROM intact/no joint swelling/no joint erythema/no joint warmth/no calf tenderness/normal strength ROM intact/no joint swelling/no joint erythema/no joint warmth/no calf tenderness/normal strength

## 2021-12-24 NOTE — ED ADULT NURSE NOTE - NSIMPLEMENTINTERV_GEN_ALL_ED
Implemented All Fall with Harm Risk Interventions:  [place] to call system. Call bell, personal items and telephone within reach. Instruct patient to call for assistance. Room bathroom lighting operational. Non-slip footwear when patient is off stretcher. Physically safe environment: no spills, clutter or unnecessary equipment. Stretcher in lowest position, wheels locked, appropriate side rails in place. Provide visual cue, wrist band, yellow gown, etc. Monitor gait and stability. Monitor for mental status changes and reorient to person, place, and time. Review medications for side effects contributing to fall risk. Reinforce activity limits and safety measures with patient and family. Provide visual clues: red socks.

## 2021-12-24 NOTE — H&P ADULT - PROBLEM SELECTOR PLAN 8
- Has had made suicidal threats to son over phone previously, denies SI/HI now  - C/w home Mirtazepine and Sertraline  - Pt on chronic Xanax per son, he is concerned that she has potential for BDZ withdrawal, will need to I-STOP pt  - Recommend psych c/s Has chronic constipation  - Miralax and Senna S/p lymph node removal and radiation 30 years ago per son  - Obtain records if needed According to pt's son, pt s/p lymph node removal and radiation 30 years ago.  - Obtain records if needed

## 2021-12-24 NOTE — H&P ADULT - NSHPLABSRESULTS_GEN_ALL_CORE
CARDIOLOGY  EKG: Sinus rhythm @ 84 bpm, first degree AV block (OX=895cm), w/ PACs.  ST depressions noted in leads I and aVL, possible biphasic T waves in V5 and V6, however, artifact present.  Labs:                      14.2   7.40  )-----------( 223      ( 24 Dec 2021 13:19 )             44.7   12-24    139  |  102  |  20  ----------------------------<  110<H>  4.0   |  20<L>  |  0.80    Ca    9.5      24 Dec 2021 13:19    TPro  7.7  /  Alb  4.2  /  TBili  0.5  /  DBili  x   /  AST  28  /  ALT  13  /  AlkPhos  99  12-24    CARDIAC ENZYMES:  Troponin T, High Sensitivity: 28 ng/L (12-24-21 @ 16:38)  Troponin T, High Sensitivity: QNS ng/L (12-24-21 @ 13:22)    Creatine Kinase, Serum: 331 U/L (12-24-21 @ 13:19)    Urinanalysis Basic (12-24-21 @ 20:41):    COVID-19/Full RVP Panel:    12-24-21 @ 13:43  Adenovirus: NotDetec  Chlamydia pneumoniae: NotDetec  Coronavirus (229E, KHU1, NL63, OC43): --  Entero/Rhinovirus: NotDetec  hMPV: NotDetec  Influenza A: NotDetec  Influenza AH1: --  Influenza AH1 2009: --  Influenza AH3: --  Influenza B: NotDetec  Mycoplasma pneumoniae: NotDetec  Parainfluenza 1: NotDetec  Parainfluenza 2: NotDetec  Parainfluenza 3: NotDetec  Parainfluenza 4: NotDetec  Rapid RVP Results: NotDetec  Resp Syncytial Virus: NotDetec  SARS-CoV-2: NotDetec    RADIOLOGY  CXR:  <Copied text below>  PROCEDURE DATE:  12/24/2021    INTERPRETATION:  CLINICAL INFORMATION: Cough and weakness  TECHNIQUE: AP view(s) of the chest.  COMPARISON: No comparison available.  Rotated right.  IMPRESSION:  Chin obscures small portion of right lung apex.    Slight right hemidiaphragm elevation.    Appearance of nonspecific patchy opacification in right perihilar region   possibly from crowding of markings and/or confluence of shadows. Clear   remaining visualized lungs. No pleural effusions or pneumothorax.    Generalized osteopenia. Spinal bilateral shoulder degenerative changes.   Degenerative osseous body adjacent to proximal medial left humeral   metaphysis.  --- End of Report ---  <End of copied text>    CTH and CT C-Spine:  <Copied text below>  IMPRESSION: No evidence of acute hemorrhage mass or mass effect  Degenerative changes involving the cervical spine as described above.  --- End of Report ---  <End of copied text> EKG personally reviewed.   Sinus rhythm @ 84 bpm, first degree AV block (TD=305ip), w/ PACs.  ST depressions noted in leads I and aVL, possible biphasic T waves in V5 and V6, however, artifact present.    Labs personally reviewed.                     14.2   7.40  )-----------( 223      ( 24 Dec 2021 13:19 )             44.7   12-24    139  |  102  |  20  ----------------------------<  110<H>  4.0   |  20<L>  |  0.80    Ca    9.5      24 Dec 2021 13:19    TPro  7.7  /  Alb  4.2  /  TBili  0.5  /  DBili  x   /  AST  28  /  ALT  13  /  AlkPhos  99  12-24    CARDIAC ENZYMES:  Troponin T, High Sensitivity: 28 ng/L (12-24-21 @ 16:38)  Troponin T, High Sensitivity: QNS ng/L (12-24-21 @ 13:22)    Creatine Kinase, Serum: 331 U/L (12-24-21 @ 13:19)    Urinalysis Basic (12-24-21 @ 20:41):    COVID-19/Full RVP Panel:    12-24-21 @ 13:43  Adenovirus: NotDetec  Chlamydia pneumoniae: NotDetec  Coronavirus (229E, KHU1, NL63, OC43): --  Entero/Rhinovirus: NotDetec  hMPV: NotDetec  Influenza A: NotDetec  Influenza AH1: --  Influenza AH1 2009: --  Influenza AH3: --  Influenza B: NotDetec  Mycoplasma pneumoniae: NotDetec  Parainfluenza 1: NotDetec  Parainfluenza 2: NotDetec  Parainfluenza 3: NotDetec  Parainfluenza 4: NotDetec  Rapid RVP Results: NotDetec  Resp Syncytial Virus: NotDetec  SARS-CoV-2: NotDetec    Imaging personally reviewed.  CXR:  PROCEDURE DATE:  12/24/2021    INTERPRETATION:  CLINICAL INFORMATION: Cough and weakness  TECHNIQUE: AP view(s) of the chest.  COMPARISON: No comparison available.  Rotated right.  IMPRESSION:  Chin obscures small portion of right lung apex.    Slight right hemidiaphragm elevation.    Appearance of nonspecific patchy opacification in right perihilar region   possibly from crowding of markings and/or confluence of shadows. Clear   remaining visualized lungs. No pleural effusions or pneumothorax.    Generalized osteopenia. Spinal bilateral shoulder degenerative changes.   Degenerative osseous body adjacent to proximal medial left humeral   metaphysis.    CTH and CT C-Spine:  IMPRESSION: No evidence of acute hemorrhage mass or mass effect  Degenerative changes involving the cervical spine as described above.

## 2021-12-24 NOTE — H&P ADULT - PROBLEM SELECTOR PLAN 5
- Continue statin  - Lipid profile   - Low fat/ low cholesterol diet - PO BP meds on hold pending S+S, consider IVP Hydralazine if needed  - Monitor BP   - S+S pending, diet on hold - Given pt failed bedside dysphagia screen, will do IV levothyroxine @ 75% of pt's home dose (38 mcg daily) for now. Resume PO levothyroxine once pt able to take PO meds.

## 2021-12-24 NOTE — ED PROVIDER NOTE - PHYSICAL EXAMINATION
GEN: Patient awake alert NAD. C-collar in place  HEENT: normocephalic, atraumatic, EOMI, no scleral icterus, moist MM  CARDIAC: RRR, S1, S2, no murmur.   PULM: CTA B/L no wheeze, rhonchi, rales.   ABD: soft NT, ND, no rebound no guarding, no CVA tenderness.   MSK: Moving all extremities, no edema. 5/5 strength and full ROM in all extremities.     NEURO: Alert but confused and confabulating, no focal neurological deficits, No CTL midline spinal ttp.   SKIN: warm, dry, no rash. Healing contusion over L trochanter without ttp.

## 2021-12-24 NOTE — ED ADULT TRIAGE NOTE - CHIEF COMPLAINT QUOTE
p/t found this morning on the floor by HHA, unknown  down time, p/t denies any fall, denies pain, as per HHA she has been weak with cough for few days

## 2021-12-24 NOTE — H&P ADULT - PROBLEM SELECTOR PLAN 12
- DVT PPx w/ Lovenox 40mg, daily - Medication history incomplete, w/ email med rec pharmacist.    #Need for Prophylactic Measure  - DVT PPx w/ Lovenox 40mg, SQ, daily - Medication history incomplete, w/ email med rec pharmacist.    #Need for Prophylactic Measure  - DVT PPx w/ Lovenox 40mg, SQ, daily    #GoC  - Code status reviewed w/ pt's sonManuel over telephone  - She does not have HCP or living will, is FULL CODE per son

## 2021-12-24 NOTE — H&P ADULT - PROBLEM SELECTOR PLAN 11
- Medication history incomplete, w/ email med rec pharmacist. 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

## 2021-12-24 NOTE — H&P ADULT - PROBLEM SELECTOR PLAN 6
S/p lymph node removal and radiation 30 years ago per son  - Obtain records if needed - C/w home Levothyroxine  - TFTs in AM - Statin on hold  - Lipid profile   - S+S pending as above Pt has made suicidal threats to her son over the phone in the past, denies SI/HI now  - Given pt failed bedside dysphagia screen, mirtazapine and sertraline on hold for now  - [facility] consult to be called in AM as above

## 2021-12-24 NOTE — H&P ADULT - NSICDXFAMHXNEG_GEN_ALL
Unable to assess due to pt's condition Unable to obtain Family History 2/2 patient's dementia with cognitive impairment.

## 2021-12-24 NOTE — H&P ADULT - NSHPPHYSICALEXAM_GEN_ALL_CORE
Vital Signs Last 24 Hrs  T(C): 37.1 (24 Dec 2021 23:33), Max: 37.1 (24 Dec 2021 23:33)  T(F): 98.8 (24 Dec 2021 23:33), Max: 98.8 (24 Dec 2021 23:33)  HR: 84 (24 Dec 2021 23:33) (79 - 84)  BP: 113/65 (24 Dec 2021 23:33) (113/65 - 143/65)  BP(mean): --  RR: 18 (24 Dec 2021 23:33) (16 - 18)  SpO2: 94% (24 Dec 2021 23:33) (94% - 100%)

## 2021-12-24 NOTE — H&P ADULT - PROBLEM SELECTOR PLAN 3
- No vision complaints from pt  - Start Ocuflox for right eye  - Pt instructed to avoid touching her eyes - Contact isolation  - Ophtho c/s  - No vision complaints from pt  - Start Ocuflox for right eye  - Pt instructed to avoid touching her eyes - Contact isolation  - Ophtho c/s called, they will see pt in AM for formal evaluation, Ocuflox started, no rec to d/c it per Ophtho call  - No vision complaints from pt  - Pt instructed to avoid touching her eyes BPs in acceptable range on admission.   - Given pt failed bedside dysphagia screen, oral BP meds on hold for now. Will also need to confirm pt's home BP meds.  - Will consider IV hydralazine PRN  - Monitor BP

## 2021-12-24 NOTE — H&P ADULT - PROBLEM SELECTOR PLAN 9
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  - Not on blood thinners per son, no ASA or Plavix  - Monitor for changes in upper extremities

## 2021-12-24 NOTE — H&P ADULT - PROBLEM SELECTOR PLAN 7
Has chronic constipation  - Miralax and Senna S/p lymph node removal and radiation 30 years ago per son  - Obtain records if needed - Will do IV Levothyroxine @ 80% of pt's home dose, will order 40mcg IVP daily - resumed PO once S+S completed and recommendations obtained  - S+S pending  - TFTs in AM - Will do IVp Levothyroxine @ 75% of pt's home dose, will order 38mcg IVP daily - resume PO once S+S completed and recommendations obtained  - S+S pending  - TFTs in AM - Will do IVP Levothyroxine @ 75% of pt's home dose, will order 38mcg IVP daily - resume PO once S+S completed and recommendations obtained  - S+S pending  - TFTs in AM Pt has chronic constipation.  - Monitor for BM with stool count  - Bowel regimen with suppository/enemas PRN if pt strict NPO

## 2021-12-24 NOTE — ED PROVIDER NOTE - CLINICAL SUMMARY MEDICAL DECISION MAKING FREE TEXT BOX
ofelia: pt here s/p fall found at home.  was down up to 12 hours.  pt with underlying dementia, had 1 j and j shot, no booster.  tp is delerious in the room, spaeking of things that happened 2 months ago as if they were this weekend.  pt can roll both hips with out tenderness, abd soft no r/g, in a c collar.  plan ct/ covid/ labs.  to delerious to be dc,  lives alone with hha coming in. ofelia: pt here s/p fall found at home.  was down up to 12 hours.  pt with underlying dementia, had 1 j and j shot, no booster.  tp is delerious in the room, spaeking of things that happened 2 months ago as if they were this weekend.  pt can roll both hips with out tenderness, abd soft no r/g, in a c collar.  plan ct/ covid/ labs.  to delerious to be dc,  lives alone with hha coming in.    hx breast cancer, htn, ? thyroid, had giant cell arteritis, as per son she has been co abd pain.    dr rick son  ofelia: pt here s/p fall found at home.  was down up to 12 hours.  pt with underlying dementia, had 1 j and j shot, no booster.  tp is delerious in the room, spaeking of things that happened 2 months ago as if they were this weekend.  pt can roll both hips with out tenderness, abd soft no r/g, in a c collar.  plan ct/ covid/ labs.  to delerious to be dc,  lives alone with hha coming in.  genital exam: pos yeast external labia, no cystocele or prolapsed uterus found    hx breast cancer, htn, ? thyroid, had giant cell arteritis, as per son she has been co abd pain.    dr rick son  ofelia: pt here s/p fall found at home.  was down up to 12 hours.  pt with underlying dementia, had 1 j and j shot, no booster.  tp is delerious in the room, spaeking of things that happened 2 months ago as if they were this weekend.  pt can roll both hips with out tenderness, abd soft no r/g, in a c collar.  plan ct/ covid/ labs.  to delerious to be dc,  lives alone with hha coming in.  genital exam: pos yeast external labia, no cystocele or prolapsed uterus found  pt with hammer toes 2nd toe bilateral    hx breast cancer, htn, ? thyroid, had giant cell arteritis, as per son she has been co abd pain.    dr rick son

## 2021-12-24 NOTE — H&P ADULT - GASTROINTESTINAL DETAILS
normal/soft/nontender/no distention/bowel sounds normal/no rebound tenderness/no guarding/no rigidity soft/nontender/no distention/no masses palpable/bowel sounds normal/no rebound tenderness/no guarding/no rigidity

## 2021-12-24 NOTE — H&P ADULT - NSHPSOCIALHISTORY_GEN_ALL_CORE
Lives alone w/ part-time home health aide, spends most of her time alone per son.  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  Denies drug or tobacco use, admits occasional alcohol consumption (wine). Lives alone w/ part-time home health aide, spends most of her time alone per son.  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  Denies tobacco or illicit drug use, admits occasional alcohol consumption (wine).

## 2021-12-24 NOTE — ED PROVIDER NOTE - OBJECTIVE STATEMENT
89yF with PMH of HTN, HLD, Dementia presents to the ED following a fall sometime either last night after 9pm and 10 am this morning. Pt spoke with aide last night and when aide came to the apartment this morning the pt did not answer the door. The door was forced opened and the pt was found on the floor. Pt is confused and poor historian, currently not complaining of anything.

## 2021-12-24 NOTE — H&P ADULT - PROBLEM SELECTOR PLAN 10
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, pt was treated w/ steroids years ago  - Not on blood thinners per son, no ASA or Plavix  - Monitor for changes in upper extremities  - Obtain outpatient records if needed - Has had made suicidal threats to son over phone previously, denies SI/HI now  - Mirtazepine and Sertraline on hold pending S+S  - Pt on chronic Xanax per son, he is concerned that she has potential for BDZ withdrawal, will need to I-STOP pt  - Recommend psych c/s - Has had made suicidal threats to son over phone previously, denies SI/HI now  - Mirtazepine and Sertraline on hold pending S+S  - Pt on chronic Xanax per son, he is concerned that she has potential for BDZ withdrawal, will need to I-STOP pt  - Psych c/s in AM as above Pt's home med list incomplete, as pt and pt's son both not sure of pt's home meds.  - Will email med rec pharmacist to perform med rec

## 2021-12-24 NOTE — ED PROVIDER NOTE - ATTENDING CONTRIBUTION TO CARE
ofelia: pt here s/p fall found at home.  was down up to 12 hours.  pt with underlying dementia, had 1 j and j shot, no booster.  tp is delerious in the room, spaeking of things that happened 2 months ago as if they were this weekend.  pt can roll both hips with out tenderness, abd soft no r/g, in a c collar.  plan ct/ covid/ labs.  to delerious to be dc,  lives alone with hha coming in.      I performed a history and physical exam of the patient and discussed their management with the resident and /or advanced care provider. I reviewed the resident and /or ACP's note and agree with the documented findings and plan of care. My medical decison making and observations are found above.

## 2021-12-24 NOTE — H&P ADULT - HISTORY OF PRESENT ILLNESS
89 y/o F, PMH of dementia, HTN, HLD, Hypothyroidism, MDD, constipation, Breast Cancer (s/p lymph node removal and radiation ~30 years ago), Takayasu Arteritis, presented to ED by EMS w/ home health aide after a fall at home the previous night.  Pt reports she fell sometime yesterday, cannot specify when, says she "slipped" backwards onto the floor, reports she hit her head, denies syncope or LoC, did not call for help and was found by aide.  Presently, she denies any c/o pain.  Unable to reach pt's aide for collateral information.  Collateral information obtained from pt's son, Manuel Sutton, who is a physician in [facility] who is there presently.  He reports that has a part-time aide, most of the time she lives by herself, has fallen previously, and that her mental status has been worsening recently.  He says that she refuses to go to any nursing facilities, will "make stuff up", has also left VM expressing suicidal thoughts, and can get "very mean", also said pt does "sundown".  He says she has suffered from depression "most of her life".  He also says she's normally not alert to time, "will talk about old friends often as if they are still alive, while all of her family and friends have all passed away".  He says she uses a cane or walker to get around, pt also was c/o "severe" abdominal pain to the son, he tried to question her on it but she could not elaborate on it.  He said her PCP is Dr. Kunal Rodríguez at [facility], he was able to clarify some of her meds.  He is concerned about her receiving Xanax as she takes it "a lot".  Pt noted to have been prescribed Haldol previously, son expressed concerns over black box warning and refused to let aide give her the Haldol.  Pt's son is concerned about safe disposition for pt and that she will refuse any attempts to be placed in rehab or nursing facility.  Pt denies any c/o pain at this time, reports she feels "okay".    Pt's son says he is her health care proxy, she does not have a living will, and is a full code. 89 y/o F, PMH of dementia, HTN, HLD, Hypothyroidism, MDD, constipation, Breast Cancer (s/p lymph node removal and radiation ~30 years ago), Takayasu Arteritis, presented to ED by EMS w/ home health aide after a fall at home the previous night.  Pt reports she fell sometime yesterday, cannot specify when, says she "slipped" backwards onto the floor, reports she hit her head, denies syncope or LoC, did not call for help and was found by aide.  Presently, she denies any c/o pain.  Unable to reach pt's aide for collateral information.  Collateral information obtained from pt's son, Manuel Sutton, who is a physician in [facility] who is there presently.  He reports that has a part-time aide, most of the time she lives by herself, has fallen previously, and that her mental status has been worsening recently.  He says that she refuses to go to any nursing facilities, will "make stuff up", has also left VM expressing suicidal thoughts, and can get "very mean", also said pt does "sundown".  He says she has suffered from depression "most of her life".  He also says she's normally not alert to time, "will talk about old friends often as if they are still alive, while all of her family and friends have all passed away".  He says she uses a cane or walker to get around, pt also was c/o "severe" abdominal pain to the son, he tried to question her on it but she could not elaborate on it.  He said her PCP is Dr. Kunal Rodríguez at [facility]. pt's son was able to clarify some of her meds.  He is concerned about her receiving Xanax as she takes it "a lot".  Pt noted to have been prescribed Haldol previously, son expressed concerns over black box warning and refused to let aide give her the Haldol.  Pt's son is concerned about safe disposition for pt and that she will refuse any attempts to be placed in rehab or nursing facility.  Pt denies any c/o pain at this time, reports she feels "okay".    Pt's son says he is her health care proxy, she does not have a living will, and is a full code. 89 y/o F with PMH of dementia, HTN, HLD, Hypothyroidism, MDD, Constipation, Breast Cancer (s/p lymph node removal and radiation ~30 years ago), Takayasu Arteritis presented to ED by EMS w/ home health aide after a fall at home the previous night.  Pt reports she fell sometime yesterday, cannot specify when, says she "slipped" backwards onto the floor, reports she hit her head, denies syncope or LOC, did not call for help and was found by aide.  Presently, she denies any c/o pain.  Unable to reach pt's aide for collateral information.  Collateral information obtained from pt's son, Manuel Sutton, who is a physician in [place] and lives there presently.  He reports that she has a part-time aide, most of the time she lives by herself, has fallen previously, and that her mental status has been worsening recently.  He says that she refuses to go to any nursing facilities, will "make stuff up," has also left VM expressing suicidal thoughts, and can get "very mean," also said pt does "sundown."  He says she has suffered from depression "most of her life."  He also says she's normally not alert to time, "will talk about old friends often as if they are still alive, while all of her family and friends have all passed away."  He says she uses a cane or walker to get around, pt also was c/o "severe" abdominal pain to the son, he tried to question her on it but she could not elaborate on it.  He said her PCP is Dr. Kunal Rod[facility]guez at [facility]. pt's son was able to clarify some of her meds.  He is concerned about her receiving Xanax as she takes it "a lot."  Pt noted to have been prescribed Haldol previously, son expressed concerns over black box warning and refused to let aide give her the Haldol.  Pt's son is concerned about safe disposition for pt and that she will refuse any attempts to be placed in rehab or nursing facility.  Pt denies any c/o pain at this time, reports she feels "okay."    Pt's son says he is her health care proxy and she does not have a living will and is full code. 89 y/o F with PMH of dementia, HTN, HLD, Hypothyroidism, MDD, Constipation, Breast Cancer (s/p lymph node removal and radiation ~30 years ago), Takayasu Arteritis presented to ED by EMS w/ home health aide after a fall at home the previous night.  Pt reports she fell sometime yesterday, cannot specify when, says she "slipped" backwards onto the floor, reports she hit her head, denies syncope or LOC, did not call for help and was found by aide.  Presently, she denies any c/o pain.  Unable to reach pt's aide for collateral information.  Collateral information obtained from pt's son, Manuel Sutton, who is a physician in [place] and lives there presently.  He reports that she has a part-time aide, most of the time she lives by herself, has fallen previously, and that her mental status has been worsening recently.  He says that she refuses to go to any nursing facilities, will "make stuff up," has also left VM expressing suicidal thoughts, and can get "very mean," also said pt does "sundown."  He says she has suffered from depression "most of her life."  He also says she's normally not alert to time, "will talk about old friends often as if they are still alive, while all of her family and friends have all passed away."  He says she uses a cane or walker to get around, pt also was c/o "severe" abdominal pain to the son, he tried to question her on it but she could not elaborate on it.  He said her PCP is Dr. Kunal Rodríguez at [facility].  Pt's son was able to clarify some of her meds.  He is concerned about her receiving Xanax as she takes it "a lot."  Pt noted to have been prescribed Haldol previously, son expressed concerns over black box warning and refused to let aide give her the Haldol.  Pt's son is concerned about safe disposition for pt and that she will refuse any attempts to be placed in rehab or nursing facility.  Pt denies any c/o pain at this time, reports she feels "okay."    Pt's son says he is her health care proxy and she does not have a living will and is full code.

## 2021-12-25 DIAGNOSIS — E03.9 HYPOTHYROIDISM, UNSPECIFIED: ICD-10-CM

## 2021-12-25 DIAGNOSIS — R53.81 OTHER MALAISE: ICD-10-CM

## 2021-12-25 LAB
A1C WITH ESTIMATED AVERAGE GLUCOSE RESULT: 5.2 % — SIGNIFICANT CHANGE UP (ref 4–5.6)
ANION GAP SERPL CALC-SCNC: 13 MMOL/L — SIGNIFICANT CHANGE UP (ref 7–14)
BUN SERPL-MCNC: 14 MG/DL — SIGNIFICANT CHANGE UP (ref 7–23)
CALCIUM SERPL-MCNC: 9.2 MG/DL — SIGNIFICANT CHANGE UP (ref 8.4–10.5)
CHLORIDE SERPL-SCNC: 104 MMOL/L — SIGNIFICANT CHANGE UP (ref 98–107)
CHOLEST SERPL-MCNC: 169 MG/DL — SIGNIFICANT CHANGE UP
CK MB BLD-MCNC: 1.8 % — SIGNIFICANT CHANGE UP (ref 0–2.5)
CK MB CFR SERPL CALC: 8.4 NG/ML — HIGH
CK SERPL-CCNC: 465 U/L — HIGH (ref 25–170)
CO2 SERPL-SCNC: 23 MMOL/L — SIGNIFICANT CHANGE UP (ref 22–31)
CREAT SERPL-MCNC: 0.73 MG/DL — SIGNIFICANT CHANGE UP (ref 0.5–1.3)
ESTIMATED AVERAGE GLUCOSE: 103 — SIGNIFICANT CHANGE UP
FOLATE SERPL-MCNC: 11.7 NG/ML — SIGNIFICANT CHANGE UP (ref 3.1–17.5)
GLUCOSE SERPL-MCNC: 108 MG/DL — HIGH (ref 70–99)
HCT VFR BLD CALC: 41.7 % — SIGNIFICANT CHANGE UP (ref 34.5–45)
HDLC SERPL-MCNC: 74 MG/DL — SIGNIFICANT CHANGE UP
HGB BLD-MCNC: 13.3 G/DL — SIGNIFICANT CHANGE UP (ref 11.5–15.5)
LIPID PNL WITH DIRECT LDL SERPL: 76 MG/DL — SIGNIFICANT CHANGE UP
MAGNESIUM SERPL-MCNC: 1.9 MG/DL — SIGNIFICANT CHANGE UP (ref 1.6–2.6)
MCHC RBC-ENTMCNC: 29.4 PG — SIGNIFICANT CHANGE UP (ref 27–34)
MCHC RBC-ENTMCNC: 31.9 GM/DL — LOW (ref 32–36)
MCV RBC AUTO: 92.3 FL — SIGNIFICANT CHANGE UP (ref 80–100)
NON HDL CHOLESTEROL: 95 MG/DL — SIGNIFICANT CHANGE UP
NRBC # BLD: 0 /100 WBCS — SIGNIFICANT CHANGE UP
NRBC # FLD: 0 K/UL — SIGNIFICANT CHANGE UP
PHOSPHATE SERPL-MCNC: 2.5 MG/DL — SIGNIFICANT CHANGE UP (ref 2.5–4.5)
PLATELET # BLD AUTO: 225 K/UL — SIGNIFICANT CHANGE UP (ref 150–400)
POTASSIUM SERPL-MCNC: 3.6 MMOL/L — SIGNIFICANT CHANGE UP (ref 3.5–5.3)
POTASSIUM SERPL-SCNC: 3.6 MMOL/L — SIGNIFICANT CHANGE UP (ref 3.5–5.3)
RBC # BLD: 4.52 M/UL — SIGNIFICANT CHANGE UP (ref 3.8–5.2)
RBC # FLD: 13.3 % — SIGNIFICANT CHANGE UP (ref 10.3–14.5)
SODIUM SERPL-SCNC: 140 MMOL/L — SIGNIFICANT CHANGE UP (ref 135–145)
T3 SERPL-MCNC: 63 NG/DL — LOW (ref 80–200)
T4 AB SER-ACNC: 5.56 UG/DL — SIGNIFICANT CHANGE UP (ref 5.1–13)
TRIGL SERPL-MCNC: 93 MG/DL — SIGNIFICANT CHANGE UP
TROPONIN T, HIGH SENSITIVITY RESULT: 34 NG/L — SIGNIFICANT CHANGE UP
TSH SERPL-MCNC: 6.72 UIU/ML — HIGH (ref 0.27–4.2)
VIT B12 SERPL-MCNC: 574 PG/ML — SIGNIFICANT CHANGE UP (ref 200–900)
WBC # BLD: 7.6 K/UL — SIGNIFICANT CHANGE UP (ref 3.8–10.5)
WBC # FLD AUTO: 7.6 K/UL — SIGNIFICANT CHANGE UP (ref 3.8–10.5)

## 2021-12-25 PROCEDURE — 99233 SBSQ HOSP IP/OBS HIGH 50: CPT

## 2021-12-25 RX ORDER — ALPRAZOLAM 0.25 MG
0.25 TABLET ORAL THREE TIMES A DAY
Refills: 0 | Status: DISCONTINUED | OUTPATIENT
Start: 2021-12-25 | End: 2021-12-27

## 2021-12-25 RX ORDER — ENOXAPARIN SODIUM 100 MG/ML
40 INJECTION SUBCUTANEOUS DAILY
Refills: 0 | Status: DISCONTINUED | OUTPATIENT
Start: 2021-12-25 | End: 2022-01-17

## 2021-12-25 RX ORDER — SODIUM CHLORIDE 9 MG/ML
1000 INJECTION, SOLUTION INTRAVENOUS
Refills: 0 | Status: DISCONTINUED | OUTPATIENT
Start: 2021-12-25 | End: 2021-12-25

## 2021-12-25 RX ORDER — ALPRAZOLAM 0.25 MG
0.25 TABLET ORAL ONCE
Refills: 0 | Status: DISCONTINUED | OUTPATIENT
Start: 2021-12-25 | End: 2021-12-25

## 2021-12-25 RX ORDER — SODIUM CHLORIDE 9 MG/ML
1000 INJECTION, SOLUTION INTRAVENOUS
Refills: 0 | Status: DISCONTINUED | OUTPATIENT
Start: 2021-12-25 | End: 2021-12-27

## 2021-12-25 RX ORDER — LEVOTHYROXINE SODIUM 125 MCG
50 TABLET ORAL DAILY
Refills: 0 | Status: DISCONTINUED | OUTPATIENT
Start: 2021-12-25 | End: 2022-01-17

## 2021-12-25 RX ADMIN — Medication 50 MICROGRAM(S): at 11:29

## 2021-12-25 RX ADMIN — Medication 0.25 MILLIGRAM(S): at 13:31

## 2021-12-25 RX ADMIN — Medication 0.25 MILLIGRAM(S): at 11:28

## 2021-12-25 RX ADMIN — Medication 0.25 MILLIGRAM(S): at 21:26

## 2021-12-25 RX ADMIN — SODIUM CHLORIDE 50 MILLILITER(S): 9 INJECTION, SOLUTION INTRAVENOUS at 03:07

## 2021-12-25 RX ADMIN — SODIUM CHLORIDE 50 MILLILITER(S): 9 INJECTION, SOLUTION INTRAVENOUS at 10:35

## 2021-12-25 RX ADMIN — OLANZAPINE 1.25 MILLIGRAM(S): 15 TABLET, FILM COATED ORAL at 00:36

## 2021-12-25 RX ADMIN — ENOXAPARIN SODIUM 40 MILLIGRAM(S): 100 INJECTION SUBCUTANEOUS at 10:47

## 2021-12-25 RX ADMIN — Medication 0.25 MILLIGRAM(S): at 15:56

## 2021-12-25 NOTE — PROGRESS NOTE ADULT - PROBLEM SELECTOR PLAN 11
- DVT ppx: Lovenox 40mg, SQ, daily    - GoC  - Code status reviewed w/ pt's son, Manuel, over telephone  - She does not have HCP or living will and is FULL CODE per son

## 2021-12-25 NOTE — PROGRESS NOTE ADULT - PROBLEM SELECTOR PLAN 6
Pt has made suicidal threats to her son over the phone in the past, denies SI/HI now  - Given pt failed bedside dysphagia screen, mirtazapine and sertraline on hold for now  - [facility] consult to be called in AM as above

## 2021-12-25 NOTE — PATIENT PROFILE ADULT - FALL HARM RISK - HARM RISK INTERVENTIONS
Assistance with ambulation/Assistance OOB with selected safe patient handling equipment/Communicate Risk of Fall with Harm to all staff/Discuss with provider need for PT consult/Monitor for mental status changes/Monitor gait and stability/Move patient closer to nurses' station/Reinforce activity limits and safety measures with patient and family/Reorient to person, place and time as needed/Tailored Fall Risk Interventions/Toileting schedule using arm’s reach rule for commode and bathroom/Use of alarms - bed, chair and/or voice tab/Visual Cue: Yellow wristband and red socks/Bed in lowest position, wheels locked, appropriate side rails in place/Call bell, personal items and telephone in reach/Instruct patient to call for assistance before getting out of bed or chair/Non-slip footwear when patient is out of bed/[place] to call system/Physically safe environment - no spills, clutter or unnecessary equipment/Purposeful Proactive Rounding/Room/bathroom lighting operational, light cord in reach

## 2021-12-25 NOTE — PROGRESS NOTE ADULT - PROBLEM SELECTOR PLAN 1
Pt with fall at home, likely mechanical in nature in setting of debility/worsening functional status 2/2 dementia  - CK elevated to 331, likely from fall, repeat in AM  - Check UA  - Mental status hallucinating   - CT head and C-spine negative for acute pathology, C-collar cleared in ED  - Obtain XR of R shoulder (bruise noted to shoulder), obtain XR of R Hip as well (pt endorsed brief right hip pain)  - EKG showed ST depression in lateral leads I and aVL, QTc 420 ms, hST 28, repeat EKG and cardiac enzymes in AM  - Check TTE  - Check orthostatics  - PT consult  - Neuro checks q4 hours  - Fall precautions, seizure precautions, bed alarm

## 2021-12-25 NOTE — PROGRESS NOTE ADULT - PROBLEM SELECTOR PLAN 8
According to pt's son, pt s/p lymph node removal and radiation 30 years ago.  - Obtain records if needed

## 2021-12-25 NOTE — PROGRESS NOTE ADULT - SUBJECTIVE AND OBJECTIVE BOX
[facility] Division of Hospital Medicine  Lakshmi Conway MD  Pager (M-F, 8A-5P): [phone]      Patient is a 89y old  Female who presents with a chief complaint of Fall, dementia (24 Dec 2021 20:27)      SUBJECTIVE / OVERNIGHT EVENTS: visual/auditory hallucinations     MEDICATIONS  (STANDING):  ALPRAZolam 0.25 milliGRAM(s) Oral three times a day  dextrose 5% + sodium chloride 0.45%. 1000 milliLiter(s) (50 mL/Hr) IV Continuous <Continuous>  enoxaparin Injectable 40 milliGRAM(s) SubCutaneous daily  levothyroxine 50 MICROGram(s) Oral daily    MEDICATIONS  (PRN):  artificial  tears Solution 1 Drop(s) Both EYES daily PRN Dry Eyes      CAPILLARY BLOOD GLUCOSE        I&O's Summary      PHYSICAL EXAM:  Vital Signs Last 24 Hrs  T(C): 37 (25 Dec 2021 13:25), Max: 37.1 (24 Dec 2021 23:33)  T(F): 98.6 (25 Dec 2021 13:25), Max: 98.8 (24 Dec 2021 23:33)  HR: 100 (25 Dec 2021 13:25) (78 - 100)  BP: 141/62 (25 Dec 2021 13:25) (113/65 - 141/62)  BP(mean): --  RR: 18 (25 Dec 2021 13:25) (16 - 18)  SpO2: 98% (25 Dec 2021 13:25) (94% - 100%)    CONSTITUTIONAL: NAD,erdely female in mild distress sec to hallucinations auditory and visual ( she is seeing her  next to bed telling her to go up and go to the hospital )  EYES: EOMI; conjunctiva and sclera clear  NECK: Supple, no palpable masses; no thyromegaly  RESPIRATORY: Normal respiratory effort; lungs are clear to auscultation bilaterally  CARDIOVASCULAR: Regular rate and rhythm, normal S1 and S2, No lower extremity edema;  ABDOMEN: Nontender to palpation, normoactive bowel sounds, no rebound/guarding;   MUSKULOSKELETAL:  no clubbing or cyanosis of digits; no joint swelling or tenderness to palpation  PSYCH: delirious   NEUROLOGY: no gross deficits;   SKIN: skin lesions , ecchymosis     LABS:                        13.3   7.60  )-----------( 225      ( 25 Dec 2021 07:16 )             41.7     12-25    140  |  104  |  14  ----------------------------<  108<H>  3.6   |  23  |  0.73    Ca    9.2      25 Dec 2021 07:16  Phos  2.5     12-25  Mg     1.90     12-25    TPro  7.7  /  Alb  4.2  /  TBili  0.5  /  DBili  x   /  AST  28  /  ALT  13  /  AlkPhos  99  12-24      CARDIAC MARKERS ( 25 Dec 2021 07:16 )  x     / x     / 465 U/L / x     / 8.4 ng/mL  CARDIAC MARKERS ( 24 Dec 2021 13:19 )  x     / x     / 331 U/L / x     / x                RADIOLOGY & ADDITIONAL TESTS:  Results Reviewed.   Imaging Personally Reviewed.    COORDINATION OF CARE:  Care Discussed with Psych in detail , RN and ACP

## 2021-12-25 NOTE — PROGRESS NOTE ADULT - PROBLEM SELECTOR PLAN 10
Pt's home med list incomplete, as pt and pt's son both not sure of pt's home meds.  - Will email med rec pharmacist to perform med rec

## 2021-12-25 NOTE — PROGRESS NOTE ADULT - PROBLEM SELECTOR PLAN 3
BPs in acceptable range on admission.   - Given pt failed bedside dysphagia screen, oral BP meds on hold for now. Will also need to confirm pt's home BP meds.  - Will consider IV hydralazine PRN  - Monitor BP

## 2021-12-25 NOTE — PROGRESS NOTE ADULT - ASSESSMENT
89 y/o F, PMH of dementia, HTN, HLD, Hypothyroidism, MDD, Constipation, Breast Cancer (s/p lymph node removal and radiation ~30 years ago), Takayasu Arteritis, admitted for fall and concern for worsening dementia.

## 2021-12-25 NOTE — CHART NOTE - NSCHARTNOTEFT_GEN_A_CORE
Psychiatry Chart Note;    Psychiatry consulted for management of benzo. withdrawal. Patient with h/o MDD. As per discussion with the primary team, pt. has been on Xanax 0.5mg QID.  Plan:  1- Decrease the dose of Xanax 0.25mg TID (HOLD for excessive sedation, monitor pulse ox and respiratory status very closely)  2- Given pt.'s age will avoid CIWA protocol   3- PRN for benzo. withdrawal: Ativan 0.5mg mg PRN for 2 or more of the following HR >110, SBP >140, Tremors and diaphoresis  4-May restart outpt. antidepressant if patient has been compliant with meds.   Patient will be seen by psychiatry on Monday, please don't hesitate to call [phone] if there is any further questions/concerns before that.   Case d/w Dr. Conway and recs were discussed in detail. Psychiatry Chart Note;    Psychiatry consulted for management of benzo. withdrawal. Patient with h/o MDD, presenting s/p fall. As per discussion with the primary team, pt. has been on Xanax 0.5mg QID.  Given pt. has been on standing benzo. will continue to prevent withdrawal but decrease the dose as below.    Plan:  1- Decrease the dose of Xanax 0.25mg TID (HOLD for excessive sedation, monitor pulse ox and respiratory status very closely)  2- Given pt.'s age will avoid standard CIWA protocol   3- PRN for benzo. withdrawal: Ativan 0.25-0.5mg  PRN for 2 or more of the following HR >110, SBP >140, tremors and diaphoresis  ***monitor vitals and sxs of benzo. withdrawal very closely and adjust meds. as needed  4-May restart outpt. antidepressant if patient has been compliant with meds.     Patient will be seen by psychiatry on Monday, please don't hesitate to call [phone] if there is any further questions/concerns before that.   Case d/w Dr. Conway and recs were discussed in detail.

## 2021-12-25 NOTE — CHART NOTE - NSCHARTNOTEFT_GEN_A_CORE
[facility] Reference #: [id number]  Patient Name: Susan Braun Date: 10/23/1925  Address: [address] [place] [id number]: Female  Rx Written	Rx Dispensed	Drug	Quantity	Days Supply	Prescriber Name	Prescriber Andria #	Payment Method	Dispenser  11/29/2021	11/30/2021	alprazolam 0.5 mg tablet	120	30	Kunal Rodríguez MD	[id number]	Insurance	[facility]  10/29/2021	10/30/2021	alprazolam 0.5 mg tablet	120	30	Kunal Rodríguez MD	[id number]	Insurance	[facility]  09/27/2021	09/29/2021	alprazolam 0.5 mg tablet	120	30	Kunal Rodríguez MD	[id number]	Insurance	[facility]  08/27/2021	08/27/2021	alprazolam 0.5 mg tablet	120	30	Tangela Lopez	[id number]	Insurance	[facility]  07/30/2021	07/31/2021	alprazolam 0.5 mg tablet	120	30	Kunal Rodríguez MD	[id number]	Insurance	[facility]  07/23/2021	07/23/2021	alprazolam 0.5 mg tablet	40	10	Tangela Lopez	[id number]	Insurance	[facility]  07/16/2021	07/17/2021	alprazolam 0.5 mg tablet	10	30	Kunal Rodríguez MD	[id number]	[place]	[facility]  06/07/2021	06/18/2021	alprazolam 0.5 mg tablet	120	30	Kunal Rodríguez MD	[id number]	Insurance	[facility]  05/21/2021	05/22/2021	alprazolam 0.5 mg tablet	120	30	Kunal Rodríguez MD	[id number]	Insurance	[facility]  04/22/2021	04/22/2021	alprazolam 0.5 mg tablet	120	30	Kunal Rodríguez MD	[id number]	Insurance	[facility]  03/26/2021	03/26/2021	alprazolam 0.5 mg tablet	120	30	Kunal Rodríguez MD	[id number]	Insurance	[facility]  02/24/2021	02/25/2021	alprazolam 0.5 mg tablet	120	30	Kunal Rodríguez MD	[id number]	Insurance	[facility]  01/25/2021	01/25/2021	alprazolam 0.5 mg tablet	120	30	Tangela Lopez	[id number]	Insurance	[facility]  12/24/2020	12/26/2020	alprazolam 0.5 mg tablet	120	30	Kunal Rodríguez MD	[id number]	[facility]

## 2021-12-25 NOTE — PROGRESS NOTE ADULT - PROBLEM SELECTOR PLAN 2
Concern for worsening dementia with behavioral disturbances based on collateral provided by pt's son.  Robert body Dementia ??? Hallucinating   - CT head negative for acute pathology  - Pt failed bedside dysphagia screen. Strict NPO, including meds. F/u S&S eval.  - According to ISTOP (Reference #: [id number]), pt has been regularly prescribed Xanax 0.5 mg qid PRN by her PCP and was last dispensed on 11/30/21. Unclear, however, how often pt has been taking the Xanax. Will hold Xanax for now until pt's Xanax use clarified. Will monitor for possible benzodiazepine withdrawal with symptom-triggered  DC CIWA with IV Ativan , administer Xanax 0.25 po TID, Ativan 0.25-0.5 for 2 or more of the following : HR>110, BP>140. tremor , sweating   - Frequent re-orientation  - Ensure proper sleep-wake cycle  - Avoid anticholinergics  - DC  Zyprexa 1.25mg IM q6h PRN for agitation, QTc 420 ms on admission EKG., consider a sitter as trying to get out of bed , high risk of falling , hallucination   - Aspiration precautions  - DC with psych  Check BC, UA &CS , CXR - no PNA, rule out Delirium

## 2021-12-25 NOTE — PROGRESS NOTE ADULT - PROBLEM SELECTOR PLAN 7
Pt has chronic constipation.  - Monitor for BM with stool count  - Bowel regimen with suppository/enemas PRN if pt strict NPO

## 2021-12-25 NOTE — PATIENT PROFILE ADULT - PATIENT REPRESENTATIVE: ( YOU CAN CHOOSE ANY PERSON THAT CAN ASSIST YOU WITH YOUR HEALTH CARE PREFERENCES, DOES NOT HAVE TO BE A SPOUSE, IMMEDIATE FAMILY OR SIGNIFICANT OTHER/PARTNER)
details same name as above Regular rate and variability/Normal S1, S2/No murmur/Symmetric upper and lower extremity pulses of normal amplitude

## 2021-12-25 NOTE — PROGRESS NOTE ADULT - PROBLEM SELECTOR PLAN 9
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  - Not on blood thinners per son, no ASA or Plavix  - Monitor for changes in upper extremities

## 2021-12-25 NOTE — PROGRESS NOTE ADULT - PROBLEM SELECTOR PLAN 5
- Given pt failed bedside dysphagia screen, will do IV levothyroxine @ 75% of pt's home dose (38 mcg daily) for now. Resume PO levothyroxine once pt able to take PO meds.

## 2021-12-26 LAB
ANION GAP SERPL CALC-SCNC: 19 MMOL/L — HIGH (ref 7–14)
APPEARANCE UR: CLEAR — SIGNIFICANT CHANGE UP
BILIRUB UR-MCNC: NEGATIVE — SIGNIFICANT CHANGE UP
BUN SERPL-MCNC: 15 MG/DL — SIGNIFICANT CHANGE UP (ref 7–23)
CALCIUM SERPL-MCNC: 9.4 MG/DL — SIGNIFICANT CHANGE UP (ref 8.4–10.5)
CHLORIDE SERPL-SCNC: 101 MMOL/L — SIGNIFICANT CHANGE UP (ref 98–107)
CO2 SERPL-SCNC: 19 MMOL/L — LOW (ref 22–31)
COLOR SPEC: YELLOW — SIGNIFICANT CHANGE UP
CREAT SERPL-MCNC: 0.71 MG/DL — SIGNIFICANT CHANGE UP (ref 0.5–1.3)
DIFF PNL FLD: ABNORMAL
GLUCOSE SERPL-MCNC: 83 MG/DL — SIGNIFICANT CHANGE UP (ref 70–99)
GLUCOSE UR QL: NEGATIVE — SIGNIFICANT CHANGE UP
HCT VFR BLD CALC: 43.1 % — SIGNIFICANT CHANGE UP (ref 34.5–45)
HGB BLD-MCNC: 13.4 G/DL — SIGNIFICANT CHANGE UP (ref 11.5–15.5)
KETONES UR-MCNC: ABNORMAL
LEUKOCYTE ESTERASE UR-ACNC: ABNORMAL
MAGNESIUM SERPL-MCNC: 1.8 MG/DL — SIGNIFICANT CHANGE UP (ref 1.6–2.6)
MCHC RBC-ENTMCNC: 29.6 PG — SIGNIFICANT CHANGE UP (ref 27–34)
MCHC RBC-ENTMCNC: 31.1 GM/DL — LOW (ref 32–36)
MCV RBC AUTO: 95.4 FL — SIGNIFICANT CHANGE UP (ref 80–100)
NITRITE UR-MCNC: NEGATIVE — SIGNIFICANT CHANGE UP
NRBC # BLD: 0 /100 WBCS — SIGNIFICANT CHANGE UP
NRBC # FLD: 0 K/UL — SIGNIFICANT CHANGE UP
PH UR: 6 — SIGNIFICANT CHANGE UP (ref 5–8)
PHOSPHATE SERPL-MCNC: 3.7 MG/DL — SIGNIFICANT CHANGE UP (ref 2.5–4.5)
PLATELET # BLD AUTO: 208 K/UL — SIGNIFICANT CHANGE UP (ref 150–400)
POTASSIUM SERPL-MCNC: 3.5 MMOL/L — SIGNIFICANT CHANGE UP (ref 3.5–5.3)
POTASSIUM SERPL-SCNC: 3.5 MMOL/L — SIGNIFICANT CHANGE UP (ref 3.5–5.3)
PROT UR-MCNC: ABNORMAL
RBC # BLD: 4.52 M/UL — SIGNIFICANT CHANGE UP (ref 3.8–5.2)
RBC # FLD: 13.2 % — SIGNIFICANT CHANGE UP (ref 10.3–14.5)
SODIUM SERPL-SCNC: 139 MMOL/L — SIGNIFICANT CHANGE UP (ref 135–145)
SP GR SPEC: 1.02 — SIGNIFICANT CHANGE UP (ref 1–1.05)
UROBILINOGEN FLD QL: SIGNIFICANT CHANGE UP
WBC # BLD: 7.15 K/UL — SIGNIFICANT CHANGE UP (ref 3.8–10.5)
WBC # FLD AUTO: 7.15 K/UL — SIGNIFICANT CHANGE UP (ref 3.8–10.5)

## 2021-12-26 PROCEDURE — 99232 SBSQ HOSP IP/OBS MODERATE 35: CPT

## 2021-12-26 RX ORDER — ACETAMINOPHEN 500 MG
650 TABLET ORAL EVERY 6 HOURS
Refills: 0 | Status: DISCONTINUED | OUTPATIENT
Start: 2021-12-26 | End: 2022-01-17

## 2021-12-26 RX ADMIN — Medication 650 MILLIGRAM(S): at 23:30

## 2021-12-26 RX ADMIN — Medication 50 MICROGRAM(S): at 05:32

## 2021-12-26 RX ADMIN — Medication 0.25 MILLIGRAM(S): at 05:32

## 2021-12-26 RX ADMIN — Medication 0.25 MILLIGRAM(S): at 15:13

## 2021-12-26 RX ADMIN — Medication 0.5 MILLIGRAM(S): at 00:42

## 2021-12-26 RX ADMIN — ENOXAPARIN SODIUM 40 MILLIGRAM(S): 100 INJECTION SUBCUTANEOUS at 15:13

## 2021-12-26 RX ADMIN — Medication 0.25 MILLIGRAM(S): at 21:26

## 2021-12-26 NOTE — PHYSICAL THERAPY INITIAL EVALUATION ADULT - ACTIVE RANGE OF MOTION EXAMINATION, REHAB EVAL
Unable to assess, patient refusing to participate with physical therapy despite maximum encouragement.

## 2021-12-26 NOTE — PROGRESS NOTE ADULT - SUBJECTIVE AND OBJECTIVE BOX
[facility] Division of Hospital Medicine  Lakshmi Conway MD  Pager (M-F, 8A-5P): [phone]      Patient is a 89y old  Female who presents with a chief complaint of Fall, dementia (25 Dec 2021 13:37)      SUBJECTIVE / OVERNIGHT EVENTS: stable , hallucinations resolved   On alida ing benzo , asymptomatic     MEDICATIONS  (STANDING):  ALPRAZolam 0.25 milliGRAM(s) Oral three times a day  dextrose 5% + sodium chloride 0.45%. 1000 milliLiter(s) (50 mL/Hr) IV Continuous <Continuous>  enoxaparin Injectable 40 milliGRAM(s) SubCutaneous daily  levothyroxine 50 MICROGram(s) Oral daily    MEDICATIONS  (PRN):  artificial  tears Solution 1 Drop(s) Both EYES daily PRN Dry Eyes  LORazepam     Tablet 0.5 milliGRAM(s) Oral every 4 hours PRN see special instructions      CAPILLARY BLOOD GLUCOSE        I&O's Summary    25 Dec 2021 07:01  -  26 Dec 2021 07:00  --------------------------------------------------------  IN: 150 mL / OUT: 0 mL / NET: 150 mL        PHYSICAL EXAM:  Vital Signs Last 24 Hrs  T(C): 36.8 (26 Dec 2021 05:30), Max: 37.1 (25 Dec 2021 11:32)  T(F): 98.2 (26 Dec 2021 05:30), Max: 98.8 (25 Dec 2021 11:32)  HR: 85 (26 Dec 2021 05:30) (80 - 100)  BP: 132/58 (26 Dec 2021 05:30) (121/73 - 141/62)  BP(mean): --  RR: 18 (26 Dec 2021 05:30) (17 - 18)  SpO2: 94% (26 Dec 2021 05:30) (94% - 98%)  CONSTITUTIONAL: NAD, EYES: EOMI; conjunctiva and sclera clear  NECK: Supple, no palpable masses; no thyromegaly  RESPIRATORY: Normal respiratory effort; lungs are clear to auscultation bilaterally  CARDIOVASCULAR: Regular rate and rhythm, normal S1 and S2,  No lower extremity edema;   ABDOMEN: Nontender to palpation, normoactive bowel sounds, no rebound/guarding;   MUSKULOSKELETAL:  no clubbing or cyanosis of digits; no joint swelling or tenderness to palpation  PSYCH: A+O to person, ; affect appropriate  NEUROLOGY: CN 2-12 are intact and symmetric; no gross sensory deficits;     LABS:                        13.4   7.15  )-----------( 208      ( 26 Dec 2021 05:47 )             43.1         139  |  101  |  15  ----------------------------<  83  3.5   |  19<L>  |  0.71    Ca    9.4      26 Dec 2021 05:47  Phos  3.7       Mg     1.80         TPro  7.7  /  Alb  4.2  /  TBili  0.5  /  DBili  x   /  AST  28  /  ALT  13  /  AlkPhos  99        CARDIAC MARKERS ( 25 Dec 2021 07:16 )  x     / x     / 465 U/L / x     / 8.4 ng/mL  CARDIAC MARKERS ( 24 Dec 2021 13:19 )  x     / x     / 331 U/L / x     / x          Urinalysis Basic - ( 26 Dec 2021 09:13 )    Color: Yellow / Appearance: Clear / S.016 / pH: x  Gluc: x / Ketone: Moderate  / Bili: Negative / Urobili: <2 mg/dL   Blood: x / Protein: Trace / Nitrite: Negative   Leuk Esterase: Trace / RBC: 1 /HPF / WBC 1 /HPF   Sq Epi: x / Non Sq Epi: 2 /HPF / Bacteria: Negative          RADIOLOGY & ADDITIONAL TESTS:  Results Reviewed.  Imaging Personally Reviewed.

## 2021-12-26 NOTE — PHYSICAL THERAPY INITIAL EVALUATION ADULT - ADDITIONAL COMMENTS
Patient poor historian unable to receive social history taken from chart. Patient lives alone mainly but has an aide 5 times week, unknown number hours a day. Patient was using cane and walker prior to admission.

## 2021-12-26 NOTE — PHYSICAL THERAPY INITIAL EVALUATION ADULT - CRITERIA FOR SKILLED THERAPEUTIC INTERVENTIONS
impairments found/functional limitations in following categories/rehab potential/predicted duration of therapy intervention

## 2021-12-26 NOTE — PHYSICAL THERAPY INITIAL EVALUATION ADULT - MANUAL MUSCLE TESTING RESULTS, REHAB EVAL
patient refusing to participate with physical therapy despite maximum encouragement./not tested due to

## 2021-12-26 NOTE — PHYSICAL THERAPY INITIAL EVALUATION ADULT - DISCHARGE DISPOSITION, PT EVAL
Unable to make recommendation at this time, please continue to follow. Will put patient on PT trial, attempt one more time before taking off if refusing to participate.

## 2021-12-26 NOTE — PROGRESS NOTE ADULT - PROBLEM SELECTOR PLAN 6
Pt has made suicidal threats to her son over the phone in the past, denies SI/HI now  - Given pt failed bedside dysphagia screen, mirtazapine and sertraline on hold for now  - [facility] consult  pending

## 2021-12-26 NOTE — PHYSICAL THERAPY INITIAL EVALUATION ADULT - PERTINENT HX OF CURRENT PROBLEM, REHAB EVAL
89 y/o F, PMH of dementia, HTN, HLD, Hypothyroidism, MDD, Constipation, Breast Cancer (s/p lymph node removal and radiation ~30 years ago), Takayasu Arteritis, admitted for fall and concern for worsening dementia.

## 2021-12-26 NOTE — PROGRESS NOTE ADULT - PROBLEM SELECTOR PLAN 2
Concern for worsening dementia with behavioral disturbances based on collateral provided by pt's son.  Robert body Dementia ??? Hallucinating   - CT head negative for acute pathology  - According to ISTOP (Reference #: [id number]), pt has been regularly prescribed Xanax 0.5 mg qid PRN by her PCP and was last dispensed on 11/30/21. Unclear, however, how often pt has been taking the Xanax. Will hold Xanax for now until pt's Xanax use clarified. Will monitor for possible benzodiazepine withdrawal with symptom-triggered  DC CIWA with IV Ativan , administer Xanax 0.25 po TID, Ativan 0.25-0.5 for 2 or more of the following : HR>110, BP>140. tremor , sweating   - Frequent re-orientation  - Ensure proper sleep-wake cycle  - Avoid anticholinergics  - DC  Zyprexa 1.25mg IM q6h PRN for agitation, QTc 420 ms on admission EKG., consider a sitter as trying to get out of bed , high risk of falling , hallucination   - Aspiration precautions  - DC with psych  Check BC, UA &CS , CXR - no PNA, rule out Delirium, now resolved

## 2021-12-27 LAB
ANION GAP SERPL CALC-SCNC: 13 MMOL/L — SIGNIFICANT CHANGE UP (ref 7–14)
B PERT DNA SPEC QL NAA+PROBE: SIGNIFICANT CHANGE UP
B PERT+PARAPERT DNA PNL SPEC NAA+PROBE: SIGNIFICANT CHANGE UP
BORDETELLA PARAPERTUSSIS (RAPRVP): SIGNIFICANT CHANGE UP
BUN SERPL-MCNC: 33 MG/DL — HIGH (ref 7–23)
C PNEUM DNA SPEC QL NAA+PROBE: SIGNIFICANT CHANGE UP
CALCIUM SERPL-MCNC: 9.1 MG/DL — SIGNIFICANT CHANGE UP (ref 8.4–10.5)
CHLORIDE SERPL-SCNC: 102 MMOL/L — SIGNIFICANT CHANGE UP (ref 98–107)
CO2 SERPL-SCNC: 24 MMOL/L — SIGNIFICANT CHANGE UP (ref 22–31)
CREAT SERPL-MCNC: 1.2 MG/DL — SIGNIFICANT CHANGE UP (ref 0.5–1.3)
CULTURE RESULTS: SIGNIFICANT CHANGE UP
FLUAV SUBTYP SPEC NAA+PROBE: SIGNIFICANT CHANGE UP
FLUBV RNA SPEC QL NAA+PROBE: SIGNIFICANT CHANGE UP
GLUCOSE SERPL-MCNC: 85 MG/DL — SIGNIFICANT CHANGE UP (ref 70–99)
HADV DNA SPEC QL NAA+PROBE: SIGNIFICANT CHANGE UP
HCOV 229E RNA SPEC QL NAA+PROBE: SIGNIFICANT CHANGE UP
HCOV HKU1 RNA SPEC QL NAA+PROBE: SIGNIFICANT CHANGE UP
HCOV NL63 RNA SPEC QL NAA+PROBE: SIGNIFICANT CHANGE UP
HCOV OC43 RNA SPEC QL NAA+PROBE: SIGNIFICANT CHANGE UP
HCT VFR BLD CALC: 38.7 % — SIGNIFICANT CHANGE UP (ref 34.5–45)
HGB BLD-MCNC: 12.8 G/DL — SIGNIFICANT CHANGE UP (ref 11.5–15.5)
HMPV RNA SPEC QL NAA+PROBE: SIGNIFICANT CHANGE UP
HPIV1 RNA SPEC QL NAA+PROBE: SIGNIFICANT CHANGE UP
HPIV2 RNA SPEC QL NAA+PROBE: SIGNIFICANT CHANGE UP
HPIV3 RNA SPEC QL NAA+PROBE: SIGNIFICANT CHANGE UP
HPIV4 RNA SPEC QL NAA+PROBE: SIGNIFICANT CHANGE UP
M PNEUMO DNA SPEC QL NAA+PROBE: SIGNIFICANT CHANGE UP
MAGNESIUM SERPL-MCNC: 2 MG/DL — SIGNIFICANT CHANGE UP (ref 1.6–2.6)
MCHC RBC-ENTMCNC: 30 PG — SIGNIFICANT CHANGE UP (ref 27–34)
MCHC RBC-ENTMCNC: 33.1 GM/DL — SIGNIFICANT CHANGE UP (ref 32–36)
MCV RBC AUTO: 90.8 FL — SIGNIFICANT CHANGE UP (ref 80–100)
NRBC # BLD: 0 /100 WBCS — SIGNIFICANT CHANGE UP
NRBC # FLD: 0 K/UL — SIGNIFICANT CHANGE UP
PHOSPHATE SERPL-MCNC: 4.6 MG/DL — HIGH (ref 2.5–4.5)
PLATELET # BLD AUTO: 244 K/UL — SIGNIFICANT CHANGE UP (ref 150–400)
POTASSIUM SERPL-MCNC: 3.4 MMOL/L — LOW (ref 3.5–5.3)
POTASSIUM SERPL-SCNC: 3.4 MMOL/L — LOW (ref 3.5–5.3)
RAPID RVP RESULT: SIGNIFICANT CHANGE UP
RBC # BLD: 4.26 M/UL — SIGNIFICANT CHANGE UP (ref 3.8–5.2)
RBC # FLD: 13.3 % — SIGNIFICANT CHANGE UP (ref 10.3–14.5)
RSV RNA SPEC QL NAA+PROBE: SIGNIFICANT CHANGE UP
RV+EV RNA SPEC QL NAA+PROBE: SIGNIFICANT CHANGE UP
SARS-COV-2 RNA SPEC QL NAA+PROBE: SIGNIFICANT CHANGE UP
SODIUM SERPL-SCNC: 139 MMOL/L — SIGNIFICANT CHANGE UP (ref 135–145)
SPECIMEN SOURCE: SIGNIFICANT CHANGE UP
WBC # BLD: 7.51 K/UL — SIGNIFICANT CHANGE UP (ref 3.8–10.5)
WBC # FLD AUTO: 7.51 K/UL — SIGNIFICANT CHANGE UP (ref 3.8–10.5)

## 2021-12-27 PROCEDURE — 99232 SBSQ HOSP IP/OBS MODERATE 35: CPT

## 2021-12-27 PROCEDURE — 90792 PSYCH DIAG EVAL W/MED SRVCS: CPT

## 2021-12-27 PROCEDURE — 73030 X-RAY EXAM OF SHOULDER: CPT | Mod: 26,RT

## 2021-12-27 PROCEDURE — 93306 TTE W/DOPPLER COMPLETE: CPT | Mod: 26

## 2021-12-27 PROCEDURE — 73502 X-RAY EXAM HIP UNI 2-3 VIEWS: CPT | Mod: 26,RT

## 2021-12-27 RX ORDER — ALPRAZOLAM 0.25 MG
0.25 TABLET ORAL
Refills: 0 | Status: DISCONTINUED | OUTPATIENT
Start: 2021-12-27 | End: 2021-12-27

## 2021-12-27 RX ORDER — SODIUM CHLORIDE 9 MG/ML
1000 INJECTION, SOLUTION INTRAVENOUS
Refills: 0 | Status: DISCONTINUED | OUTPATIENT
Start: 2021-12-27 | End: 2021-12-27

## 2021-12-27 RX ORDER — ALPRAZOLAM 0.25 MG
0.25 TABLET ORAL THREE TIMES A DAY
Refills: 0 | Status: DISCONTINUED | OUTPATIENT
Start: 2021-12-27 | End: 2022-01-03

## 2021-12-27 RX ORDER — ALPRAZOLAM 0.25 MG
0.5 TABLET ORAL
Refills: 0 | Status: DISCONTINUED | OUTPATIENT
Start: 2021-12-28 | End: 2021-12-28

## 2021-12-27 RX ORDER — MIRTAZAPINE 45 MG/1
15 TABLET, ORALLY DISINTEGRATING ORAL AT BEDTIME
Refills: 0 | Status: DISCONTINUED | OUTPATIENT
Start: 2021-12-27 | End: 2022-01-17

## 2021-12-27 RX ORDER — SERTRALINE 25 MG/1
50 TABLET, FILM COATED ORAL DAILY
Refills: 0 | Status: DISCONTINUED | OUTPATIENT
Start: 2021-12-27 | End: 2021-12-28

## 2021-12-27 RX ORDER — ALPRAZOLAM 0.25 MG
0.5 TABLET ORAL ONCE
Refills: 0 | Status: DISCONTINUED | OUTPATIENT
Start: 2021-12-27 | End: 2021-12-27

## 2021-12-27 RX ADMIN — Medication 50 MICROGRAM(S): at 05:33

## 2021-12-27 RX ADMIN — Medication 650 MILLIGRAM(S): at 01:30

## 2021-12-27 RX ADMIN — ENOXAPARIN SODIUM 40 MILLIGRAM(S): 100 INJECTION SUBCUTANEOUS at 12:49

## 2021-12-27 RX ADMIN — SERTRALINE 50 MILLIGRAM(S): 25 TABLET, FILM COATED ORAL at 14:50

## 2021-12-27 RX ADMIN — Medication 0.25 MILLIGRAM(S): at 12:49

## 2021-12-27 RX ADMIN — MIRTAZAPINE 15 MILLIGRAM(S): 45 TABLET, ORALLY DISINTEGRATING ORAL at 22:41

## 2021-12-27 RX ADMIN — Medication 0.25 MILLIGRAM(S): at 05:36

## 2021-12-27 RX ADMIN — Medication 0.5 MILLIGRAM(S): at 20:44

## 2021-12-27 RX ADMIN — SODIUM CHLORIDE 60 MILLILITER(S): 9 INJECTION, SOLUTION INTRAVENOUS at 08:42

## 2021-12-27 NOTE — BH CONSULTATION LIAISON ASSESSMENT NOTE - MSE UNSTRUCTURED FT
Mental Status Exam:  Lavelle: well groomed, fair hygiene     Behavior: psychomotor agitation  Motor: no tremors, EPS, or rigidity  Gait: did not assess, pt in bed  Speech: normal rate, rhythm, prosedy and volume   Mood: "i'm angry!"  Affect: irritable, congruent  Thought process: impoverished, perseverative    Thought Content: denies paranoia, delusions   Perception: denies AH/VH  SI: denies  HI: denies  Insight: poor  Judgment: poor    Cognitive Exam:  Orientation: AOx1  Recall: poor  Attention: poor  Abstraction: poor

## 2021-12-27 NOTE — BH CONSULTATION LIAISON ASSESSMENT NOTE - NSBHCHARTREVIEWVS_PSY_A_CORE FT
Vital Signs Last 24 Hrs  T(C): 36.9 (27 Dec 2021 14:57), Max: 37.7 (26 Dec 2021 23:30)  T(F): 98.4 (27 Dec 2021 14:57), Max: 99.8 (26 Dec 2021 23:30)  HR: 71 (27 Dec 2021 14:57) (63 - 75)  BP: 108/58 (27 Dec 2021 14:57) (108/58 - 143/83)  BP(mean): --  RR: 18 (27 Dec 2021 14:57) (18 - 18)  SpO2: 95% (27 Dec 2021 14:57) (91% - 99%)

## 2021-12-27 NOTE — BH CONSULTATION LIAISON ASSESSMENT NOTE - HPI (INCLUDE ILLNESS QUALITY, SEVERITY, DURATION, TIMING, CONTEXT, MODIFYING FACTORS, ASSOCIATED SIGNS AND SYMPTOMS)
89 y/o F with PMH of dementia, HTN, HLD, Hypothyroidism, MDD, Constipation, Breast Cancer (s/p lymph node removal and radiation ~30 years ago), Takayasu Arteritis presented to ED by EMS w/ home health aide after a fall at home the previous night.     Patient irritable on exam, demands to go home, AOx1-2, yelling that she wants to go home but not aggressive. Able to follow 1 step commands. Unclear what patient's baseline is though has dementia per history. Denies suicidality/intent or plan and denies homicidality/intent or plan.     Attempted to call son Manuel [phone] - unable to reach, will attempt again   ISTOP checked, patient has been on xanax for years

## 2021-12-27 NOTE — PROGRESS NOTE ADULT - PROBLEM SELECTOR PLAN 5
- Given pt failed bedside dysphagia screen, will do IV levothyroxine @ 75% of pt's home dose (38 mcg daily) for now. Resume PO levothyroxine once pt able to take PO meds. -c/w synthroid

## 2021-12-27 NOTE — PHARMACOTHERAPY INTERVENTION NOTE - COMMENTS
Medication history is incomplete. Outpatient pharmacy, [facility] ([phone]) currently closed. Pharmacy will be contacted Sunday 12/26/21 for verification of patient's home medications. Please call spectra [phone] if you have any questions.
Medication history verified with [facility] ([phone]). Medication list updated in Outpatient Medication Record (OMR). Please call spectra [phone] if you have any questions.
Attempted to contact [email or web address] ([phone]), however pharmacy closed and will reopen 12/27/21.    Please contact spectra [phone] with any questions.

## 2021-12-27 NOTE — PROGRESS NOTE ADULT - PROBLEM SELECTOR PLAN 6
Pt has made suicidal threats to her son over the phone in the past, denies SI/HI now  - Given pt failed bedside dysphagia screen, mirtazapine and sertraline on hold for now  - [facility] consult  pending Pt has made suicidal threats to her son over the phone in the past, denies SI/HI now  - [facility] consult  pending  -c/w remeron and zoloft

## 2021-12-27 NOTE — PROGRESS NOTE ADULT - SUBJECTIVE AND OBJECTIVE BOX
[facility] Division of Hospital Medicine  Dr. Lisset Crockett  Pager (M-F, 8A-5P): [phone]      Patient is a 89y old  Female who presents with a chief complaint of Fall, dementia (25 Dec 2021 13:37)      SUBJECTIVE / OVERNIGHT EVENTS: Seen and examined. Patient states that she is fine and does not belong in the hospital. Wants to go home. Denies any SOB or cough.    MEDICATIONS  (STANDING):  ALPRAZolam 0.25 milliGRAM(s) Oral three times a day  dextrose 5% + sodium chloride 0.45%. 1000 milliLiter(s) (50 mL/Hr) IV Continuous <Continuous>  enoxaparin Injectable 40 milliGRAM(s) SubCutaneous daily  levothyroxine 50 MICROGram(s) Oral daily    MEDICATIONS  (PRN):  artificial  tears Solution 1 Drop(s) Both EYES daily PRN Dry Eyes  LORazepam     Tablet 0.5 milliGRAM(s) Oral every 4 hours PRN see special instructions        PHYSICAL EXAM:  Vital Signs Last 24 Hrs  T(C): 36.9 (27 Dec 2021 14:57), Max: 37.7 (26 Dec 2021 23:30)  T(F): 98.4 (27 Dec 2021 14:57), Max: 99.8 (26 Dec 2021 23:30)  HR: 71 (27 Dec 2021 14:57) (63 - 75)  BP: 108/58 (27 Dec 2021 14:57) (108/58 - 143/83)  BP(mean): --  RR: 18 (27 Dec 2021 14:57) (18 - 18)  SpO2: 95% (27 Dec 2021 14:57) (91% - 99%)  CONSTITUTIONAL: NAD, EYES: EOMI; conjunctiva and sclera clear  NECK: Supple, no palpable masses; no thyromegaly  RESPIRATORY: Normal respiratory effort; lungs are clear to auscultation bilaterally  CARDIOVASCULAR: Regular rate and rhythm, normal S1 and S2,  No lower extremity edema;   ABDOMEN: Nontender to palpation, normoactive bowel sounds, no rebound/guarding;   MUSKULOSKELETAL:  no clubbing or cyanosis of digits; no joint swelling or tenderness to palpation  PSYCH: A+O to person, ; affect appropriate  NEUROLOGY: CN 2-12 are intact and symmetric; no gross sensory deficits;     LABS:                                   12.8   7.51  )-----------( 244      ( 27 Dec 2021 07:17 )             38.7   12    139  |  102  |  33<H>  ----------------------------<  85  3.4<L>   |  24  |  1.20    Ca    9.1      27 Dec 2021 07:17  Phos  4.6       Mg     2.00         Urinalysis Basic - ( 26 Dec 2021 09:13 )    Color: Yellow / Appearance: Clear / S.016 / pH: x  Gluc: x / Ketone: Moderate  / Bili: Negative / Urobili: <2 mg/dL   Blood: x / Protein: Trace / Nitrite: Negative   Leuk Esterase: Trace / RBC: 1 /HPF / WBC 1 /HPF   Sq Epi: x / Non Sq Epi: 2 /HPF / Bacteria: Negative          RADIOLOGY & ADDITIONAL TESTS:  Results Reviewed.  Imaging Personally Reviewed.

## 2021-12-27 NOTE — PROGRESS NOTE ADULT - PROBLEM SELECTOR PLAN 3
BPs in acceptable range on admission.   - Given pt failed bedside dysphagia screen, oral BP meds on hold for now. Will also need to confirm pt's home BP meds.  - Will consider IV hydralazine PRN  - Monitor BP BPs in acceptable range

## 2021-12-27 NOTE — PROGRESS NOTE ADULT - PROBLEM SELECTOR PLAN 2
Concern for worsening dementia with behavioral disturbances based on collateral provided by pt's son.  Robert body Dementia ??? Hallucinating   - CT head negative for acute pathology  - According to ISTOP (Reference #: [id number]), pt has been regularly prescribed Xanax 0.5 mg qid PRN by her PCP and was last dispensed on 11/30/21. Unclear, however, how often pt has been taking the Xanax.   - Frequent re-orientation  - Ensure proper sleep-wake cycle  - Avoid anticholinergics  - DC  Zyprexa 1.25mg IM q6h PRN for agitation, QTc 420 ms on admission EKG., consider a sitter as trying to get out of bed , high risk of falling , hallucination   - Aspiration precautions

## 2021-12-27 NOTE — PROGRESS NOTE ADULT - PROBLEM SELECTOR PLAN 10
Pt's home med list incomplete, as pt and pt's son both not sure of pt's home meds.  - Will email med rec pharmacist to perform med rec - DVT ppx: Lovenox 40mg, SQ, daily    - GoC  - Code status reviewed w/ pt's son, Manuel, over telephone  - She does not have HCP or living will and is FULL CODE per son

## 2021-12-27 NOTE — PROGRESS NOTE ADULT - PROBLEM SELECTOR PLAN 1
Pt with fall at home, likely mechanical in nature in setting of debility/worsening functional status 2/2 dementia  - CK elevated to 331, likely from fall  - Mental status hallucinating   - CT head and C-spine negative for acute pathology, C-collar cleared in ED  - Obtain XR of R shoulder (bruise noted to shoulder), obtain XR of R Hip as well (pt endorsed brief right hip pain)  - EKG showed ST depression in lateral leads I and aVL, QTc 420 ms, hST 28-->34  - normal orthostatics  - PT consult  - Neuro checks q4 hours  - Fall precautions, seizure precautions, bed alarm

## 2021-12-27 NOTE — BH CONSULTATION LIAISON ASSESSMENT NOTE - SUMMARY
89 y/o F with PMH of dementia, HTN, HLD, Hypothyroidism, MDD, Constipation, Breast Cancer (s/p lymph node removal and radiation ~30 years ago), Takayasu Arteritis presented to ED by EMS w/ home health aide after a fall at home the previous night.     Patient confused and irritable though does not appear to have AH/VH, tremors, diaphoresis, n/v etc., likely not in benzo withdrawal however patient likely to be fall risk on benzos which may be worsening her overall impulse control as well. Dementia with likely superimposed delirium, would benefit from expeditious discharge as hospitalization itself is delirium risk for her.     Unable to reach family, some documentation about son's wishes to avoid antipsychotics due to BBB, will need to reach to discuss.  ISTOP - xanax 0.5mg 4x daily, had  been down to xanax 0.25mg TID on 12/26. Would avoid CIWA as patient likely to get over-medicated on this due to symptom overlap and lack of reliability.     Recs:  - C/w benzo taper:  >>Xanax 0.25mg TID for 1d  >>Xanax 0.25mg BID for 1d  >>Xanax 0.25mg qHS for 1d then STOP  - PRN xanax 0.25mg TID PRN anxiety, agitation or signs of benzo withdrawal (2 or more of the following HR >110, SBP >140, tremors and diaphoresis)  - would avoid CIWA  - C/w Zoloft 50mg and Mirtazapine 15mg qHS as ordered   - no psychiatric c/i to discharge (if PMD or family would prefer to do benzo taper as outpt this may suffice)

## 2021-12-27 NOTE — BH CONSULTATION LIAISON ASSESSMENT NOTE - CURRENT MEDICATION
MEDICATIONS  (STANDING):  ALPRAZolam 0.5 milliGRAM(s) Oral once  enoxaparin Injectable 40 milliGRAM(s) SubCutaneous daily  levothyroxine 50 MICROGram(s) Oral daily  mirtazapine 15 milliGRAM(s) Oral at bedtime  sertraline 50 milliGRAM(s) Oral daily    MEDICATIONS  (PRN):  acetaminophen     Tablet .. 650 milliGRAM(s) Oral every 6 hours PRN Temp greater or equal to 38C (100.4F), Moderate Pain (4 - 6)  ALPRAZolam 0.25 milliGRAM(s) Oral three times a day PRN Anxiety  artificial  tears Solution 1 Drop(s) Both EYES daily PRN Dry Eyes

## 2021-12-28 LAB
ANION GAP SERPL CALC-SCNC: 13 MMOL/L — SIGNIFICANT CHANGE UP (ref 7–14)
BUN SERPL-MCNC: 29 MG/DL — HIGH (ref 7–23)
CALCIUM SERPL-MCNC: 9.2 MG/DL — SIGNIFICANT CHANGE UP (ref 8.4–10.5)
CHLORIDE SERPL-SCNC: 101 MMOL/L — SIGNIFICANT CHANGE UP (ref 98–107)
CO2 SERPL-SCNC: 23 MMOL/L — SIGNIFICANT CHANGE UP (ref 22–31)
CREAT SERPL-MCNC: 0.96 MG/DL — SIGNIFICANT CHANGE UP (ref 0.5–1.3)
GLUCOSE SERPL-MCNC: 104 MG/DL — HIGH (ref 70–99)
HCT VFR BLD CALC: 39.5 % — SIGNIFICANT CHANGE UP (ref 34.5–45)
HGB BLD-MCNC: 12.8 G/DL — SIGNIFICANT CHANGE UP (ref 11.5–15.5)
MAGNESIUM SERPL-MCNC: 2 MG/DL — SIGNIFICANT CHANGE UP (ref 1.6–2.6)
MCHC RBC-ENTMCNC: 30.1 PG — SIGNIFICANT CHANGE UP (ref 27–34)
MCHC RBC-ENTMCNC: 32.4 GM/DL — SIGNIFICANT CHANGE UP (ref 32–36)
MCV RBC AUTO: 92.9 FL — SIGNIFICANT CHANGE UP (ref 80–100)
NRBC # BLD: 0 /100 WBCS — SIGNIFICANT CHANGE UP
NRBC # FLD: 0 K/UL — SIGNIFICANT CHANGE UP
PHOSPHATE SERPL-MCNC: 2.9 MG/DL — SIGNIFICANT CHANGE UP (ref 2.5–4.5)
PLATELET # BLD AUTO: 244 K/UL — SIGNIFICANT CHANGE UP (ref 150–400)
POTASSIUM SERPL-MCNC: 3.4 MMOL/L — LOW (ref 3.5–5.3)
POTASSIUM SERPL-SCNC: 3.4 MMOL/L — LOW (ref 3.5–5.3)
RBC # BLD: 4.25 M/UL — SIGNIFICANT CHANGE UP (ref 3.8–5.2)
RBC # FLD: 13.2 % — SIGNIFICANT CHANGE UP (ref 10.3–14.5)
SODIUM SERPL-SCNC: 137 MMOL/L — SIGNIFICANT CHANGE UP (ref 135–145)
WBC # BLD: 8.47 K/UL — SIGNIFICANT CHANGE UP (ref 3.8–10.5)
WBC # FLD AUTO: 8.47 K/UL — SIGNIFICANT CHANGE UP (ref 3.8–10.5)

## 2021-12-28 PROCEDURE — 99232 SBSQ HOSP IP/OBS MODERATE 35: CPT

## 2021-12-28 RX ORDER — ALPRAZOLAM 0.25 MG
0.25 TABLET ORAL AT BEDTIME
Refills: 0 | Status: DISCONTINUED | OUTPATIENT
Start: 2021-12-30 | End: 2021-12-30

## 2021-12-28 RX ORDER — ALPRAZOLAM 0.25 MG
0.25 TABLET ORAL
Refills: 0 | Status: DISCONTINUED | OUTPATIENT
Start: 2021-12-29 | End: 2021-12-29

## 2021-12-28 RX ORDER — ATORVASTATIN CALCIUM 80 MG/1
20 TABLET, FILM COATED ORAL AT BEDTIME
Refills: 0 | Status: DISCONTINUED | OUTPATIENT
Start: 2021-12-28 | End: 2022-01-17

## 2021-12-28 RX ORDER — ALPRAZOLAM 0.25 MG
0.25 TABLET ORAL
Refills: 0 | Status: DISCONTINUED | OUTPATIENT
Start: 2021-12-28 | End: 2021-12-28

## 2021-12-28 RX ORDER — SERTRALINE 25 MG/1
75 TABLET, FILM COATED ORAL DAILY
Refills: 0 | Status: DISCONTINUED | OUTPATIENT
Start: 2021-12-29 | End: 2021-12-29

## 2021-12-28 RX ORDER — POLYETHYLENE GLYCOL 3350 17 G/17G
17 POWDER, FOR SOLUTION ORAL DAILY
Refills: 0 | Status: DISCONTINUED | OUTPATIENT
Start: 2021-12-28 | End: 2022-01-05

## 2021-12-28 RX ORDER — POTASSIUM CHLORIDE 20 MEQ
10 PACKET (EA) ORAL ONCE
Refills: 0 | Status: COMPLETED | OUTPATIENT
Start: 2021-12-28 | End: 2021-12-28

## 2021-12-28 RX ORDER — SERTRALINE 25 MG/1
25 TABLET, FILM COATED ORAL ONCE
Refills: 0 | Status: COMPLETED | OUTPATIENT
Start: 2021-12-28 | End: 2021-12-28

## 2021-12-28 RX ORDER — SENNA PLUS 8.6 MG/1
1 TABLET ORAL AT BEDTIME
Refills: 0 | Status: DISCONTINUED | OUTPATIENT
Start: 2021-12-28 | End: 2022-01-05

## 2021-12-28 RX ORDER — POTASSIUM CHLORIDE 20 MEQ
40 PACKET (EA) ORAL ONCE
Refills: 0 | Status: DISCONTINUED | OUTPATIENT
Start: 2021-12-28 | End: 2021-12-28

## 2021-12-28 RX ADMIN — SERTRALINE 25 MILLIGRAM(S): 25 TABLET, FILM COATED ORAL at 21:55

## 2021-12-28 RX ADMIN — ATORVASTATIN CALCIUM 20 MILLIGRAM(S): 80 TABLET, FILM COATED ORAL at 21:55

## 2021-12-28 RX ADMIN — POLYETHYLENE GLYCOL 3350 17 GRAM(S): 17 POWDER, FOR SOLUTION ORAL at 11:18

## 2021-12-28 RX ADMIN — Medication 100 MILLIEQUIVALENT(S): at 11:18

## 2021-12-28 RX ADMIN — Medication 0.5 MILLIGRAM(S): at 06:22

## 2021-12-28 RX ADMIN — Medication 50 MICROGRAM(S): at 06:23

## 2021-12-28 RX ADMIN — MIRTAZAPINE 15 MILLIGRAM(S): 45 TABLET, ORALLY DISINTEGRATING ORAL at 21:56

## 2021-12-28 RX ADMIN — SENNA PLUS 1 TABLET(S): 8.6 TABLET ORAL at 21:56

## 2021-12-28 RX ADMIN — Medication 0.25 MILLIGRAM(S): at 14:32

## 2021-12-28 RX ADMIN — SERTRALINE 50 MILLIGRAM(S): 25 TABLET, FILM COATED ORAL at 11:19

## 2021-12-28 RX ADMIN — ENOXAPARIN SODIUM 40 MILLIGRAM(S): 100 INJECTION SUBCUTANEOUS at 11:18

## 2021-12-28 RX ADMIN — Medication 0.25 MILLIGRAM(S): at 21:55

## 2021-12-28 NOTE — PROGRESS NOTE ADULT - PROBLEM SELECTOR PLAN 1
Pt with fall at home, likely mechanical in nature in setting of debility/worsening functional status 2/2 dementia  - CK elevated to 331, likely from fall  - Mental status - AAO to self and place. Agitated at times.   - CT head and C-spine negative for acute pathology, C-collar cleared in ED  - EKG showed ST depression in lateral leads I and aVL, QTc 420 ms, hST 28-->34, chest pain free  - normal orthostatics  - PT consult - refused - will attempt again today  - Fall precautions, seizure precautions, bed alarm

## 2021-12-28 NOTE — PROGRESS NOTE ADULT - SUBJECTIVE AND OBJECTIVE BOX
[facility] Division of Hospital Medicine  Dr. Lisset Crockett  Pager (M-F, 8A-5P): [phone]      Patient is a 89y old  Female who presents with a chief complaint of Fall, dementia (25 Dec 2021 13:37)      SUBJECTIVE / OVERNIGHT EVENTS: Seen and examined. Patient states that she didnt eat must yesterday and today has some abdominal cramps. Feels tired and wants to rest. Will try to eat today. Less agitated today during my evaluation     MEDICATIONS  (STANDING):  ALPRAZolam 0.25 milliGRAM(s) Oral three times a day  dextrose 5% + sodium chloride 0.45%. 1000 milliLiter(s) (50 mL/Hr) IV Continuous <Continuous>  enoxaparin Injectable 40 milliGRAM(s) SubCutaneous daily  levothyroxine 50 MICROGram(s) Oral daily    MEDICATIONS  (PRN):  artificial  tears Solution 1 Drop(s) Both EYES daily PRN Dry Eyes  LORazepam     Tablet 0.5 milliGRAM(s) Oral every 4 hours PRN see special instructions        PHYSICAL EXAM:  Vital Signs Last 24 Hrs  T(C): 36.9 (28 Dec 2021 05:12), Max: 37 (27 Dec 2021 21:30)  T(F): 98.5 (28 Dec 2021 05:12), Max: 98.6 (27 Dec 2021 21:30)  HR: 70 (28 Dec 2021 05:12) (70 - 73)  BP: 120/60 (28 Dec 2021 05:12) (108/58 - 130/63)  BP(mean): --  RR: 17 (28 Dec 2021 05:12) (17 - 18)  SpO2: 95% (28 Dec 2021 05:12) (95% - 95%)  CONSTITUTIONAL: NAD, EYES: EOMI; conjunctiva and sclera clear  NECK: Supple, no palpable masses; no thyromegaly  RESPIRATORY: Normal respiratory effort; lungs are clear to auscultation bilaterally  CARDIOVASCULAR: Regular rate and rhythm, normal S1 and S2,  No lower extremity edema;   ABDOMEN: Nontender to palpation, normoactive bowel sounds, no rebound/guarding;   MUSKULOSKELETAL:  no clubbing or cyanosis of digits; no joint swelling or tenderness to palpation  PSYCH: A+O to person, ; affect appropriate  NEUROLOGY: CN 2-12 are intact and symmetric; no gross sensory deficits;     LABS:                                   12.8   8.47  )-----------( 244      ( 28 Dec 2021 06:25 )             39.5       137  |  101  |  29<H>  ----------------------------<  104<H>  3.4<L>   |  23  |  0.96    Ca    9.2      28 Dec 2021 06:25  Phos  2.9       Mg     2.00             Urinalysis Basic - ( 26 Dec 2021 09:13 )    Color: Yellow / Appearance: Clear / S.016 / pH: x  Gluc: x / Ketone: Moderate  / Bili: Negative / Urobili: <2 mg/dL   Blood: x / Protein: Trace / Nitrite: Negative   Leuk Esterase: Trace / RBC: 1 /HPF / WBC 1 /HPF   Sq Epi: x / Non Sq Epi: 2 /HPF / Bacteria: Negative          RADIOLOGY & ADDITIONAL TESTS:  Results Reviewed.  Imaging Personally Reviewed.

## 2021-12-28 NOTE — PROGRESS NOTE ADULT - PROBLEM SELECTOR PLAN 10
- DVT ppx: Lovenox 40mg, SQ, daily    - GoC  - Code status reviewed w/ pt's son, Manuel, over telephone  - She does not have HCP or living will and is FULL CODE per son    Attempted to call son and VM left on 12/28

## 2021-12-28 NOTE — PROGRESS NOTE ADULT - PROBLEM SELECTOR PLAN 2
Concern for worsening dementia with behavioral disturbances based on collateral provided by pt's son.  Robert body Dementia ?  - CT head negative for acute pathology  - According to ISTOP (Reference #: [id number]), pt has been regularly prescribed Xanax 0.5 mg qid PRN by her PCP and was last dispensed on 11/30/21. Per patient she has been taking 2 tabs in AM and 2 tabs in PM for years  - Frequent re-orientation  - Ensure proper sleep-wake cycle  - Avoid anticholinergics  - xanax taper as outlined by psych  - Aspiration precautions

## 2021-12-28 NOTE — PROGRESS NOTE ADULT - PROBLEM SELECTOR PLAN 6
Pt has made suicidal threats to her son over the phone in the past, denies SI/HI now  -psych input appreciated   -c/w remeron and zoloft

## 2021-12-28 NOTE — BH CONSULTATION LIAISON PROGRESS NOTE - NSBHFUPINTERVALHXFT_PSY_A_CORE
patient AOx2-3 knows she is in hospital in [facility], knows date, able to follow 1-2 step commands, not depressed but has some discomfort in abd    no n/v, headaches, no tremors

## 2021-12-29 LAB
ANION GAP SERPL CALC-SCNC: 13 MMOL/L — SIGNIFICANT CHANGE UP (ref 7–14)
BUN SERPL-MCNC: 19 MG/DL — SIGNIFICANT CHANGE UP (ref 7–23)
CALCIUM SERPL-MCNC: 9.2 MG/DL — SIGNIFICANT CHANGE UP (ref 8.4–10.5)
CHLORIDE SERPL-SCNC: 103 MMOL/L — SIGNIFICANT CHANGE UP (ref 98–107)
CO2 SERPL-SCNC: 23 MMOL/L — SIGNIFICANT CHANGE UP (ref 22–31)
CREAT SERPL-MCNC: 0.79 MG/DL — SIGNIFICANT CHANGE UP (ref 0.5–1.3)
GLUCOSE SERPL-MCNC: 108 MG/DL — HIGH (ref 70–99)
HCT VFR BLD CALC: 40 % — SIGNIFICANT CHANGE UP (ref 34.5–45)
HGB BLD-MCNC: 12.8 G/DL — SIGNIFICANT CHANGE UP (ref 11.5–15.5)
MAGNESIUM SERPL-MCNC: 1.9 MG/DL — SIGNIFICANT CHANGE UP (ref 1.6–2.6)
MCHC RBC-ENTMCNC: 29.7 PG — SIGNIFICANT CHANGE UP (ref 27–34)
MCHC RBC-ENTMCNC: 32 GM/DL — SIGNIFICANT CHANGE UP (ref 32–36)
MCV RBC AUTO: 92.8 FL — SIGNIFICANT CHANGE UP (ref 80–100)
NRBC # BLD: 0 /100 WBCS — SIGNIFICANT CHANGE UP
NRBC # FLD: 0 K/UL — SIGNIFICANT CHANGE UP
PHOSPHATE SERPL-MCNC: 2.6 MG/DL — SIGNIFICANT CHANGE UP (ref 2.5–4.5)
PLATELET # BLD AUTO: 240 K/UL — SIGNIFICANT CHANGE UP (ref 150–400)
POTASSIUM SERPL-MCNC: 3.5 MMOL/L — SIGNIFICANT CHANGE UP (ref 3.5–5.3)
POTASSIUM SERPL-SCNC: 3.5 MMOL/L — SIGNIFICANT CHANGE UP (ref 3.5–5.3)
RBC # BLD: 4.31 M/UL — SIGNIFICANT CHANGE UP (ref 3.8–5.2)
RBC # FLD: 13.2 % — SIGNIFICANT CHANGE UP (ref 10.3–14.5)
SODIUM SERPL-SCNC: 139 MMOL/L — SIGNIFICANT CHANGE UP (ref 135–145)
WBC # BLD: 9.02 K/UL — SIGNIFICANT CHANGE UP (ref 3.8–10.5)
WBC # FLD AUTO: 9.02 K/UL — SIGNIFICANT CHANGE UP (ref 3.8–10.5)

## 2021-12-29 PROCEDURE — 99232 SBSQ HOSP IP/OBS MODERATE 35: CPT

## 2021-12-29 PROCEDURE — 99233 SBSQ HOSP IP/OBS HIGH 50: CPT

## 2021-12-29 RX ORDER — SERTRALINE 25 MG/1
100 TABLET, FILM COATED ORAL DAILY
Refills: 0 | Status: DISCONTINUED | OUTPATIENT
Start: 2021-12-29 | End: 2022-01-17

## 2021-12-29 RX ADMIN — ATORVASTATIN CALCIUM 20 MILLIGRAM(S): 80 TABLET, FILM COATED ORAL at 21:30

## 2021-12-29 RX ADMIN — SERTRALINE 75 MILLIGRAM(S): 25 TABLET, FILM COATED ORAL at 12:04

## 2021-12-29 RX ADMIN — Medication 50 MICROGRAM(S): at 05:29

## 2021-12-29 RX ADMIN — Medication 0.25 MILLIGRAM(S): at 17:23

## 2021-12-29 RX ADMIN — MIRTAZAPINE 15 MILLIGRAM(S): 45 TABLET, ORALLY DISINTEGRATING ORAL at 21:30

## 2021-12-29 RX ADMIN — SENNA PLUS 1 TABLET(S): 8.6 TABLET ORAL at 21:30

## 2021-12-29 RX ADMIN — Medication 0.25 MILLIGRAM(S): at 05:29

## 2021-12-29 RX ADMIN — ENOXAPARIN SODIUM 40 MILLIGRAM(S): 100 INJECTION SUBCUTANEOUS at 12:04

## 2021-12-29 RX ADMIN — POLYETHYLENE GLYCOL 3350 17 GRAM(S): 17 POWDER, FOR SOLUTION ORAL at 12:04

## 2021-12-29 NOTE — BH CONSULTATION LIAISON PROGRESS NOTE - NSBHASSESSMENTFT_PSY_ALL_CORE
89 y/o F with PMH of dementia, HTN, HLD, Hypothyroidism, MDD, Constipation, Breast Cancer (s/p lymph node removal and radiation ~30 years ago), Takayasu Arteritis presented to ED by EMS w/ home health aide after a fall at home the previous night.     Patient confused and irritable though does not appear to have AH/VH, tremors, diaphoresis, n/v etc., likely not in benzo withdrawal however patient likely to be fall risk on benzos which may be worsening her overall impulse control as well. Dementia with likely superimposed delirium, would benefit from expeditious discharge as hospitalization itself is delirium risk for her.     Unable to reach family, some documentation about son's wishes to avoid antipsychotics due to BBB, will need to reach to discuss.  ISTOP - xanax 0.5mg 4x daily, had  been down to xanax 0.25mg TID on 12/26. Would avoid CIWA as patient likely to get over-medicated on this due to symptom overlap and lack of reliability.     12/28 - patient doing much better, less agitated and confused, tolerating xanax taper no withdrawal features  12/29 - patient still not agitated, appears minimally confused, no issues with xanax taper  no withdrawal issues, would c/w sertraline titration.     Recs:  - C/w benzo taper:  >>Xanax 0.25mg TID for 1d  >>Xanax 0.25mg BID for 1d  >>Xanax 0.25mg qHS for 1d then STOP  - PRN xanax 0.25mg TID PRN anxiety, agitation or signs of benzo withdrawal (2 or more of the following HR >110, SBP >140, tremors and diaphoresis)  - would avoid CIWA  - Increase Zoloft to 100mg QD  - C/w Mirtazapine 15mg qHS as ordered   - no psychiatric c/i to discharge

## 2021-12-29 NOTE — PROGRESS NOTE ADULT - ASSESSMENT
89 y/o F, PMH of dementia, HTN, HLD, Hypothyroidism, MDD, Constipation, Breast Cancer (s/p lymph node removal and radiation ~30 years ago), Takayasu Arteritis, admitted for fall and concern for worsening dementia vs ativan induced.

## 2021-12-29 NOTE — PROGRESS NOTE ADULT - PROBLEM SELECTOR PLAN 1
Pt with fall at home, likely mechanical in nature in setting of debility/worsening functional status 2/2 dementia  - CK elevated to 331, likely from fall  - Mental status - AAO to self and place. Agitated at times.   - CT head and C-spine negative for acute pathology, C-collar cleared in ED  - EKG showed ST depression in lateral leads I and aVL, QTc 420 ms, hST 28-->34, chest pain free  - normal orthostatics  - PT consult  - Fall precautions, seizure precautions, bed alarm

## 2021-12-29 NOTE — BH CONSULTATION LIAISON PROGRESS NOTE - MSE UNSTRUCTURED FT
Mental Status Exam:  Lavelle: well groomed, fair hygiene     Behavior: psychomotor agitation  Motor: no tremors, EPS, or rigidity  Gait: did not assess, pt in bed  Speech: normal rate, rhythm, prosedy and volume   Mood: "i'm angry!"  Affect: irritable, congruent  Thought process: impoverished, perseverative    Thought Content: denies paranoia, delusions   Perception: denies AH/VH  SI: denies  HI: denies  Insight: poor  Judgment: poor    Cognitive Exam:  Orientation: AOx1  Recall: poor  Attention: poor  Abstraction: poor
Mental Status Exam:  Lavelle: well groomed, fair hygiene     Behavior: psychomotor agitation  Motor: no tremors, EPS, or rigidity  Gait: did not assess, pt in bed  Speech: normal rate, rhythm, prosedy and volume   Mood: "i'm angry!"  Affect: irritable, congruent  Thought process: impoverished, perseverative    Thought Content: denies paranoia, delusions   Perception: denies AH/VH  SI: denies  HI: denies  Insight: poor  Judgment: poor    Cognitive Exam:  Orientation: AOx1  Recall: poor  Attention: poor  Abstraction: poor

## 2021-12-29 NOTE — BH CONSULTATION LIAISON PROGRESS NOTE - NSBHFUPINTERVALHXFT_PSY_A_CORE
Patient awake, calm and cooperative able to follow simple commands though is stubborn/obstinate at times, able to be redirected. Complaning of leg and abd pain though says her hip/leg pain has been worked up before and she has been told that it is a product of aging. Denies suicidality/intent or plan and denies homicidality/intent or plan.    Aox2, knows she is in hospital but forgets name, vaguely understands situation, thinks it is a few days after silvestre but gets year wrong

## 2021-12-29 NOTE — PROGRESS NOTE ADULT - SUBJECTIVE AND OBJECTIVE BOX
[facility] Division of Hospital Medicine  Ivan Holliday MD  In House Pager [phone]    Patient is a 89y old  Female who presents with a chief complaint of Fall, dementia (28 Dec 2021 11:55)      SUBJECTIVE / OVERNIGHT EVENTS:  No overnight events. Labs and vitals reviewed.  Patient seen and examined at bedside, no acute complaints.  No fever, no chills, no SOB, no CP, no n/v/d, no abd pain, no dysuria      MEDICATIONS  (STANDING):  ALPRAZolam 0.25 milliGRAM(s) Oral two times a day  atorvastatin 20 milliGRAM(s) Oral at bedtime  enoxaparin Injectable 40 milliGRAM(s) SubCutaneous daily  levothyroxine 50 MICROGram(s) Oral daily  mirtazapine 15 milliGRAM(s) Oral at bedtime  polyethylene glycol 3350 17 Gram(s) Oral daily  senna 1 Tablet(s) Oral at bedtime  sertraline 100 milliGRAM(s) Oral daily    MEDICATIONS  (PRN):  acetaminophen     Tablet .. 650 milliGRAM(s) Oral every 6 hours PRN Temp greater or equal to 38C (100.4F), Moderate Pain (4 - 6)  ALPRAZolam 0.25 milliGRAM(s) Oral three times a day PRN Anxiety  artificial  tears Solution 1 Drop(s) Both EYES daily PRN Dry Eyes    CAPILLARY BLOOD GLUCOSE        I&O's Summary      PHYSICAL EXAM:  Vital Signs Last 24 Hrs  T(C): 36.6 (29 Dec 2021 13:01), Max: 37.1 (29 Dec 2021 05:22)  T(F): 97.9 (29 Dec 2021 13:01), Max: 98.8 (29 Dec 2021 05:22)  HR: 68 (29 Dec 2021 13:01) (68 - 84)  BP: 116/73 (29 Dec 2021 13:01) (116/73 - 142/74)  BP(mean): --  RR: 17 (29 Dec 2021 13:01) (17 - 18)  SpO2: 94% (29 Dec 2021 13:01) (94% - 97%)    Gen: NAD; resting in bed. awake and alert.   Pulm: no respiratory distress; no accessory muscle use; CTA b/l; no wheezing; no crackles.   Cards: RRR, nl S1/S2; no obvious murmurs; no LE edema; no JVD  Abd: soft; NT on exam; +bs  Ext: no cyanosis; no joint effusion; no joint pain on palpation.  Psych: Oriented x0-1  Skin: no rash; no cyanosis    LABS:                        12.8   9.02  )-----------( 240      ( 29 Dec 2021 05:57 )             40.0     12-29    139  |  103  |  19  ----------------------------<  108<H>  3.5   |  23  |  0.79    Ca    9.2      29 Dec 2021 05:57  Phos  2.6     12-29  Mg     1.90     12-29                Culture - Blood (collected 27 Dec 2021 07:06)  Source: .Blood Blood-Peripheral  Preliminary Report (28 Dec 2021 08:01):    No growth to date.    Culture - Blood (collected 27 Dec 2021 07:06)  Source: .Blood Blood-Peripheral  Preliminary Report (28 Dec 2021 08:01):    No growth to date.        RADIOLOGY & ADDITIONAL TESTS:  Results Reviewed: Y  Imaging Personally Reviewed: Y  Electrocardiogram Personally Reviewed: Y    COORDINATION OF CARE:  Care Discussed with Consultants/Other Providers [Y/N]: Y  Prior or Outpatient Records Reviewed [Y/N]: Y

## 2021-12-29 NOTE — PROGRESS NOTE ADULT - PROBLEM SELECTOR PLAN 2
Concern for worsening dementia with behavioral disturbances based on collateral provided by pt's son.  Robert body Dementia ?  - CT head negative for acute pathology  - According to ISTOP (Reference #: [id number]), pt has been regularly prescribed Xanax 0.5 mg qid PRN by her PCP and was last dispensed on 11/30/21. Per patient she has been taking 2 tabs in AM and 2 tabs in PM for years  - Frequent re-orientation  - Ensure proper sleep-wake cycle  - Avoid anticholinergics  - xanax taper as outlined by psych  - Aspiration precautions

## 2021-12-30 DIAGNOSIS — M25.512 PAIN IN LEFT SHOULDER: ICD-10-CM

## 2021-12-30 DIAGNOSIS — G89.18 OTHER ACUTE POSTPROCEDURAL PAIN: ICD-10-CM

## 2021-12-30 LAB
ANION GAP SERPL CALC-SCNC: 14 MMOL/L — SIGNIFICANT CHANGE UP (ref 7–14)
BUN SERPL-MCNC: 17 MG/DL — SIGNIFICANT CHANGE UP (ref 7–23)
CALCIUM SERPL-MCNC: 9.2 MG/DL — SIGNIFICANT CHANGE UP (ref 8.4–10.5)
CHLORIDE SERPL-SCNC: 101 MMOL/L — SIGNIFICANT CHANGE UP (ref 98–107)
CO2 SERPL-SCNC: 23 MMOL/L — SIGNIFICANT CHANGE UP (ref 22–31)
CREAT SERPL-MCNC: 0.71 MG/DL — SIGNIFICANT CHANGE UP (ref 0.5–1.3)
CULTURE RESULTS: SIGNIFICANT CHANGE UP
GLUCOSE SERPL-MCNC: 88 MG/DL — SIGNIFICANT CHANGE UP (ref 70–99)
HCT VFR BLD CALC: 39.3 % — SIGNIFICANT CHANGE UP (ref 34.5–45)
HGB BLD-MCNC: 13 G/DL — SIGNIFICANT CHANGE UP (ref 11.5–15.5)
MAGNESIUM SERPL-MCNC: 2 MG/DL — SIGNIFICANT CHANGE UP (ref 1.6–2.6)
MCHC RBC-ENTMCNC: 30 PG — SIGNIFICANT CHANGE UP (ref 27–34)
MCHC RBC-ENTMCNC: 33.1 GM/DL — SIGNIFICANT CHANGE UP (ref 32–36)
MCV RBC AUTO: 90.6 FL — SIGNIFICANT CHANGE UP (ref 80–100)
NRBC # BLD: 0 /100 WBCS — SIGNIFICANT CHANGE UP
NRBC # FLD: 0 K/UL — SIGNIFICANT CHANGE UP
PHOSPHATE SERPL-MCNC: 2.9 MG/DL — SIGNIFICANT CHANGE UP (ref 2.5–4.5)
PLATELET # BLD AUTO: 258 K/UL — SIGNIFICANT CHANGE UP (ref 150–400)
POTASSIUM SERPL-MCNC: 3.6 MMOL/L — SIGNIFICANT CHANGE UP (ref 3.5–5.3)
POTASSIUM SERPL-SCNC: 3.6 MMOL/L — SIGNIFICANT CHANGE UP (ref 3.5–5.3)
RBC # BLD: 4.34 M/UL — SIGNIFICANT CHANGE UP (ref 3.8–5.2)
RBC # FLD: 13.3 % — SIGNIFICANT CHANGE UP (ref 10.3–14.5)
SODIUM SERPL-SCNC: 138 MMOL/L — SIGNIFICANT CHANGE UP (ref 135–145)
SPECIMEN SOURCE: SIGNIFICANT CHANGE UP
WBC # BLD: 9.47 K/UL — SIGNIFICANT CHANGE UP (ref 3.8–10.5)
WBC # FLD AUTO: 9.47 K/UL — SIGNIFICANT CHANGE UP (ref 3.8–10.5)

## 2021-12-30 PROCEDURE — 99233 SBSQ HOSP IP/OBS HIGH 50: CPT

## 2021-12-30 PROCEDURE — 73030 X-RAY EXAM OF SHOULDER: CPT | Mod: 26,LT

## 2021-12-30 RX ADMIN — Medication 50 MICROGRAM(S): at 05:30

## 2021-12-30 RX ADMIN — ATORVASTATIN CALCIUM 20 MILLIGRAM(S): 80 TABLET, FILM COATED ORAL at 22:50

## 2021-12-30 RX ADMIN — SENNA PLUS 1 TABLET(S): 8.6 TABLET ORAL at 22:50

## 2021-12-30 RX ADMIN — SERTRALINE 100 MILLIGRAM(S): 25 TABLET, FILM COATED ORAL at 11:59

## 2021-12-30 RX ADMIN — Medication 650 MILLIGRAM(S): at 13:15

## 2021-12-30 RX ADMIN — Medication 0.25 MILLIGRAM(S): at 22:51

## 2021-12-30 RX ADMIN — MIRTAZAPINE 15 MILLIGRAM(S): 45 TABLET, ORALLY DISINTEGRATING ORAL at 22:51

## 2021-12-30 RX ADMIN — Medication 650 MILLIGRAM(S): at 12:04

## 2021-12-30 RX ADMIN — ENOXAPARIN SODIUM 40 MILLIGRAM(S): 100 INJECTION SUBCUTANEOUS at 11:59

## 2021-12-30 RX ADMIN — POLYETHYLENE GLYCOL 3350 17 GRAM(S): 17 POWDER, FOR SOLUTION ORAL at 11:58

## 2021-12-30 NOTE — PROGRESS NOTE ADULT - PROBLEM SELECTOR PLAN 1
acute pain of left shoulder. x1 day  no swelling, but ROM is limited due to pain.   will get Xray shoulder.   unable to participate with PT due to pain at this time.   tylenol prn for pain.

## 2021-12-30 NOTE — PROGRESS NOTE ADULT - PROBLEM SELECTOR PLAN 3
Concern for worsening dementia with behavioral disturbances based on collateral provided by pt's son.  Robert body Dementia ?  - CT head negative for acute pathology  - According to ISTOP (Reference #: [id number]), pt has been regularly prescribed Xanax 0.5 mg qid PRN by her PCP and was last dispensed on 11/30/21. Per patient she has been taking 2 tabs in AM and 2 tabs in PM for years  - Frequent re-orientation  - Ensure proper sleep-wake cycle  - Avoid anticholinergics  - xanax taper as outlined by psych  - Aspiration precautions

## 2021-12-30 NOTE — PROGRESS NOTE ADULT - SUBJECTIVE AND OBJECTIVE BOX
[facility] Division of Hospital Medicine  Ivan Holliday MD  In House Pager [phone]    Patient is a 89y old  Female who presents with a chief complaint of Fall, dementia (29 Dec 2021 15:12)      SUBJECTIVE / OVERNIGHT EVENTS:  No overnight events. Labs and vitals reviewed.  Patient seen and examined at bedside, reports left shoulder pain after she was moved by PCA yesterday.   No fever, no chills, no SOB, no CP, no n/v/d, no abd pain, no dysuria      MEDICATIONS  (STANDING):  ALPRAZolam 0.25 milliGRAM(s) Oral at bedtime  atorvastatin 20 milliGRAM(s) Oral at bedtime  enoxaparin Injectable 40 milliGRAM(s) SubCutaneous daily  levothyroxine 50 MICROGram(s) Oral daily  mirtazapine 15 milliGRAM(s) Oral at bedtime  polyethylene glycol 3350 17 Gram(s) Oral daily  senna 1 Tablet(s) Oral at bedtime  sertraline 100 milliGRAM(s) Oral daily    MEDICATIONS  (PRN):  acetaminophen     Tablet .. 650 milliGRAM(s) Oral every 6 hours PRN Temp greater or equal to 38C (100.4F), Moderate Pain (4 - 6)  ALPRAZolam 0.25 milliGRAM(s) Oral three times a day PRN Anxiety  artificial  tears Solution 1 Drop(s) Both EYES daily PRN Dry Eyes    CAPILLARY BLOOD GLUCOSE        I&O's Summary      PHYSICAL EXAM:  Vital Signs Last 24 Hrs  T(C): 36.8 (30 Dec 2021 11:30), Max: 36.8 (29 Dec 2021 22:15)  T(F): 98.2 (30 Dec 2021 11:30), Max: 98.2 (29 Dec 2021 22:15)  HR: 99 (30 Dec 2021 11:30) (80 - 99)  BP: 135/71 (30 Dec 2021 11:30) (135/71 - 135/89)  BP(mean): --  RR: 18 (30 Dec 2021 11:30) (18 - 18)  SpO2: 93% (30 Dec 2021 11:30) (93% - 95%)    Gen: NAD; resting in bed. awake and alert.   Pulm: no respiratory distress; no accessory muscle use; CTA b/l; no wheezing; no crackles.   Cards: RRR, nl S1/S2; no obvious murmurs; no LE edema; no JVD  Abd: soft; NT on exam; +bs  Ext: no cyanosis; no joint effusion; no joint pain on palpation.  Skin: no rash; no cyanosis    LABS:                        13.0   9.47  )-----------( 258      ( 30 Dec 2021 08:02 )             39.3     12-30    138  |  101  |  17  ----------------------------<  88  3.6   |  23  |  0.71    Ca    9.2      30 Dec 2021 08:02  Phos  2.9     12-30  Mg     2.00     12-30                  RADIOLOGY & ADDITIONAL TESTS:  Results Reviewed: Y  Imaging Personally Reviewed: Y  Electrocardiogram Personally Reviewed: Y    COORDINATION OF CARE:  Care Discussed with Consultants/Other Providers [Y/N]: Y  Prior or Outpatient Records Reviewed [Y/N]: [facility]

## 2021-12-31 LAB
ANION GAP SERPL CALC-SCNC: 20 MMOL/L — HIGH (ref 7–14)
BUN SERPL-MCNC: 23 MG/DL — SIGNIFICANT CHANGE UP (ref 7–23)
CALCIUM SERPL-MCNC: 9.6 MG/DL — SIGNIFICANT CHANGE UP (ref 8.4–10.5)
CHLORIDE SERPL-SCNC: 101 MMOL/L — SIGNIFICANT CHANGE UP (ref 98–107)
CO2 SERPL-SCNC: 19 MMOL/L — LOW (ref 22–31)
CREAT SERPL-MCNC: 0.89 MG/DL — SIGNIFICANT CHANGE UP (ref 0.5–1.3)
GLUCOSE SERPL-MCNC: 87 MG/DL — SIGNIFICANT CHANGE UP (ref 70–99)
HCT VFR BLD CALC: 43.6 % — SIGNIFICANT CHANGE UP (ref 34.5–45)
HGB BLD-MCNC: 14.4 G/DL — SIGNIFICANT CHANGE UP (ref 11.5–15.5)
MAGNESIUM SERPL-MCNC: 2.2 MG/DL — SIGNIFICANT CHANGE UP (ref 1.6–2.6)
MCHC RBC-ENTMCNC: 29.8 PG — SIGNIFICANT CHANGE UP (ref 27–34)
MCHC RBC-ENTMCNC: 33 GM/DL — SIGNIFICANT CHANGE UP (ref 32–36)
MCV RBC AUTO: 90.1 FL — SIGNIFICANT CHANGE UP (ref 80–100)
NRBC # BLD: 0 /100 WBCS — SIGNIFICANT CHANGE UP
NRBC # FLD: 0 K/UL — SIGNIFICANT CHANGE UP
PHOSPHATE SERPL-MCNC: 3.2 MG/DL — SIGNIFICANT CHANGE UP (ref 2.5–4.5)
PLATELET # BLD AUTO: 286 K/UL — SIGNIFICANT CHANGE UP (ref 150–400)
POTASSIUM SERPL-MCNC: 3.4 MMOL/L — LOW (ref 3.5–5.3)
POTASSIUM SERPL-SCNC: 3.4 MMOL/L — LOW (ref 3.5–5.3)
RBC # BLD: 4.84 M/UL — SIGNIFICANT CHANGE UP (ref 3.8–5.2)
RBC # FLD: 13.3 % — SIGNIFICANT CHANGE UP (ref 10.3–14.5)
SARS-COV-2 RNA SPEC QL NAA+PROBE: SIGNIFICANT CHANGE UP
SODIUM SERPL-SCNC: 140 MMOL/L — SIGNIFICANT CHANGE UP (ref 135–145)
WBC # BLD: 9.97 K/UL — SIGNIFICANT CHANGE UP (ref 3.8–10.5)
WBC # FLD AUTO: 9.97 K/UL — SIGNIFICANT CHANGE UP (ref 3.8–10.5)

## 2021-12-31 PROCEDURE — 99232 SBSQ HOSP IP/OBS MODERATE 35: CPT

## 2021-12-31 RX ORDER — POTASSIUM CHLORIDE 20 MEQ
40 PACKET (EA) ORAL ONCE
Refills: 0 | Status: COMPLETED | OUTPATIENT
Start: 2021-12-31 | End: 2021-12-31

## 2021-12-31 RX ADMIN — SENNA PLUS 1 TABLET(S): 8.6 TABLET ORAL at 21:20

## 2021-12-31 RX ADMIN — ATORVASTATIN CALCIUM 20 MILLIGRAM(S): 80 TABLET, FILM COATED ORAL at 21:20

## 2021-12-31 RX ADMIN — SERTRALINE 100 MILLIGRAM(S): 25 TABLET, FILM COATED ORAL at 11:51

## 2021-12-31 RX ADMIN — POLYETHYLENE GLYCOL 3350 17 GRAM(S): 17 POWDER, FOR SOLUTION ORAL at 11:50

## 2021-12-31 RX ADMIN — Medication 0.25 MILLIGRAM(S): at 04:29

## 2021-12-31 RX ADMIN — Medication 50 MICROGRAM(S): at 04:31

## 2021-12-31 RX ADMIN — ENOXAPARIN SODIUM 40 MILLIGRAM(S): 100 INJECTION SUBCUTANEOUS at 11:50

## 2021-12-31 RX ADMIN — Medication 40 MILLIEQUIVALENT(S): at 17:23

## 2021-12-31 RX ADMIN — MIRTAZAPINE 15 MILLIGRAM(S): 45 TABLET, ORALLY DISINTEGRATING ORAL at 21:19

## 2021-12-31 NOTE — PROGRESS NOTE ADULT - PROBLEM SELECTOR PLAN 3
Concern for worsening dementia with behavioral disturbances based on collateral provided by pt's son.  Robert body Dementia ?  - CT head negative for acute pathology  - According to ISTOP (Reference #: [id number]), pt has been regularly prescribed Xanax 0.5 mg qid PRN by her PCP and was last dispensed on 11/30/21. Per patient she has been taking 2 tabs in AM and 2 tabs in PM for years  - Frequent re-orientation  - Ensure proper sleep-wake cycle  - Avoid anticholinergics  - xanax taper as outlined by psych  - Aspiration precautions

## 2021-12-31 NOTE — PROGRESS NOTE ADULT - PROBLEM SELECTOR PLAN 1
acute pain of left shoulder. x1 day  no swelling, but ROM is limited due to pain.   Xray neg for fx or dislocation.   unable to participate with PT due to pain at this time.   tylenol prn for pain.

## 2021-12-31 NOTE — PROGRESS NOTE ADULT - SUBJECTIVE AND OBJECTIVE BOX
[facility] Division of Hospital Medicine  Ivan Holliday MD  In House Pager [phone]    Patient is a 89y old  Female who presents with a chief complaint of Fall, dementia (30 Dec 2021 15:15)      SUBJECTIVE / OVERNIGHT EVENTS:  No overnight events. Labs and vitals reviewed. unable to perform PT due to her shoulder pain.  Xray neg for acute paths.  Patient seen and examined at bedside, no acute complaints. pain slightly better today with tylenol  No fever, no chills, no SOB, no CP, no n/v/d, no abd pain, no dysuria      MEDICATIONS  (STANDING):  atorvastatin 20 milliGRAM(s) Oral at bedtime  enoxaparin Injectable 40 milliGRAM(s) SubCutaneous daily  levothyroxine 50 MICROGram(s) Oral daily  mirtazapine 15 milliGRAM(s) Oral at bedtime  polyethylene glycol 3350 17 Gram(s) Oral daily  potassium chloride   Powder 40 milliEquivalent(s) Oral once  senna 1 Tablet(s) Oral at bedtime  sertraline 100 milliGRAM(s) Oral daily    MEDICATIONS  (PRN):  acetaminophen     Tablet .. 650 milliGRAM(s) Oral every 6 hours PRN Temp greater or equal to 38C (100.4F), Moderate Pain (4 - 6)  ALPRAZolam 0.25 milliGRAM(s) Oral three times a day PRN Anxiety  artificial  tears Solution 1 Drop(s) Both EYES daily PRN Dry Eyes    CAPILLARY BLOOD GLUCOSE        I&O's Summary      PHYSICAL EXAM:  Vital Signs Last 24 Hrs  T(C): 36.6 (31 Dec 2021 11:42), Max: 36.8 (30 Dec 2021 22:50)  T(F): 97.8 (31 Dec 2021 11:42), Max: 98.3 (30 Dec 2021 22:50)  HR: 72 (31 Dec 2021 11:42) (72 - 79)  BP: 128/68 (31 Dec 2021 11:42) (127/72 - 131/78)  BP(mean): --  RR: 17 (31 Dec 2021 11:42) (17 - 17)  SpO2: 98% (31 Dec 2021 11:42) (95% - 98%)    Gen: NAD; resting in bed. awake and alert.   Pulm: no respiratory distress; no accessory muscle use; CTA b/l; no wheezing; no crackles.   Cards: RRR, nl S1/S2; no obvious murmurs; no LE edema; no JVD  Abd: soft; NT on exam; +bs  Ext: no cyanosis; no joint effusion; no joint pain on palpation. ROM limited in left shoulder.   Skin: no rash; no cyanosis    LABS:                        14.4   9.97  )-----------( 286      ( 31 Dec 2021 07:29 )             43.6     12-31    140  |  101  |  23  ----------------------------<  87  3.4<L>   |  19<L>  |  0.89    Ca    9.6      31 Dec 2021 07:29  Phos  3.2     12-31  Mg     2.20     12-31                  RADIOLOGY & ADDITIONAL TESTS:  Results Reviewed: Y  Imaging Personally Reviewed: Y  Electrocardiogram Personally Reviewed: Y    COORDINATION OF CARE:  Care Discussed with Consultants/Other Providers [Y/N]: Y  Prior or Outpatient Records Reviewed [Y/N]: [facility]

## 2022-01-01 LAB
CULTURE RESULTS: SIGNIFICANT CHANGE UP
CULTURE RESULTS: SIGNIFICANT CHANGE UP
SPECIMEN SOURCE: SIGNIFICANT CHANGE UP
SPECIMEN SOURCE: SIGNIFICANT CHANGE UP

## 2022-01-01 PROCEDURE — 99233 SBSQ HOSP IP/OBS HIGH 50: CPT

## 2022-01-01 RX ADMIN — Medication 650 MILLIGRAM(S): at 05:54

## 2022-01-01 RX ADMIN — POLYETHYLENE GLYCOL 3350 17 GRAM(S): 17 POWDER, FOR SOLUTION ORAL at 12:00

## 2022-01-01 RX ADMIN — SERTRALINE 100 MILLIGRAM(S): 25 TABLET, FILM COATED ORAL at 12:00

## 2022-01-01 RX ADMIN — Medication 650 MILLIGRAM(S): at 06:26

## 2022-01-01 RX ADMIN — SENNA PLUS 1 TABLET(S): 8.6 TABLET ORAL at 21:30

## 2022-01-01 RX ADMIN — Medication 50 MICROGRAM(S): at 05:54

## 2022-01-01 RX ADMIN — ATORVASTATIN CALCIUM 20 MILLIGRAM(S): 80 TABLET, FILM COATED ORAL at 21:31

## 2022-01-01 RX ADMIN — MIRTAZAPINE 15 MILLIGRAM(S): 45 TABLET, ORALLY DISINTEGRATING ORAL at 21:30

## 2022-01-01 RX ADMIN — ENOXAPARIN SODIUM 40 MILLIGRAM(S): 100 INJECTION SUBCUTANEOUS at 11:59

## 2022-01-01 NOTE — PROGRESS NOTE ADULT - PROBLEM SELECTOR PLAN 2
Pt with fall at home, likely mechanical in nature in setting of debility/worsening functional status 2/2 dementia  - CK elevated to 331, likely from fall  - Mental status - AAO to self and place. Agitated at times.   - CT head and C-spine negative for acute pathology, C-collar cleared in ED  - EKG showed ST depression in lateral leads I and aVL, QTc 420 ms, hST 28-->34, chest pain free  - normal orthostatics  - PT consult - patient refusing to participate.   - Fall precautions, seizure precautions, bed alarm

## 2022-01-01 NOTE — PROGRESS NOTE ADULT - PROBLEM SELECTOR PLAN 3
Concern for worsening dementia with behavioral disturbances based on collateral provided by pt's son.  Robert body Dementia ?  - CT head negative for acute pathology  - According to ISTOP (Reference #: [id number]), pt has been regularly prescribed Xanax 0.5 mg qid PRN by her PCP and was last dispensed on 11/30/21. Per patient she has been taking 2 tabs in AM and 2 tabs in PM for years  - Frequent re-orientation  - Ensure proper sleep-wake cycle  - Avoid anticholinergics  - xanax taper as outlined by psych  - Aspiration precautions

## 2022-01-01 NOTE — PROGRESS NOTE ADULT - PROBLEM SELECTOR PLAN 11
- DVT ppx: Lovenox 40mg, SQ, daily    - GoC  - Code status reviewed w/ pt's son, Manuel, over telephone  - She does not have HCP or living will and is FULL CODE per son  - Son wants patient to be discharge to some kind of long term care facility. f/u with CM/SW.

## 2022-01-01 NOTE — PROGRESS NOTE ADULT - ASSESSMENT
89 y/o F, PMH of dementia, HTN, HLD, Hypothyroidism, MDD, Constipation, Breast Cancer (s/p lymph node removal and radiation ~30 years ago), Takayasu Arteritis, admitted for fall and concern for worsening dementia vs ativan induced. now improved and off Ativan taper. refusing to participate in PT for discharge planning.

## 2022-01-01 NOTE — PROGRESS NOTE ADULT - SUBJECTIVE AND OBJECTIVE BOX
[facility] Division of Hospital Medicine  Ivan Holliday MD  In House Pager [phone]    Patient is a 89y old  Female who presents with a chief complaint of Fall, dementia (31 Dec 2021 14:32)      SUBJECTIVE / OVERNIGHT EVENTS:  No overnight events. Labs and vitals reviewed.  Patient seen and examined at bedside, no acute complaints. still with shoulder pain but improved. does not want to participate in PT.   No fever, no chills, no SOB, no CP, no n/v/d, no abd pain, no dysuria      MEDICATIONS  (STANDING):  atorvastatin 20 milliGRAM(s) Oral at bedtime  enoxaparin Injectable 40 milliGRAM(s) SubCutaneous daily  levothyroxine 50 MICROGram(s) Oral daily  mirtazapine 15 milliGRAM(s) Oral at bedtime  polyethylene glycol 3350 17 Gram(s) Oral daily  senna 1 Tablet(s) Oral at bedtime  sertraline 100 milliGRAM(s) Oral daily    MEDICATIONS  (PRN):  acetaminophen     Tablet .. 650 milliGRAM(s) Oral every 6 hours PRN Temp greater or equal to 38C (100.4F), Moderate Pain (4 - 6)  ALPRAZolam 0.25 milliGRAM(s) Oral three times a day PRN Anxiety  artificial  tears Solution 1 Drop(s) Both EYES daily PRN Dry Eyes    CAPILLARY BLOOD GLUCOSE        I&O's Summary    31 Dec 2021 07:01  -  01 Jan 2022 07:00  --------------------------------------------------------  IN: 360 mL / OUT: 0 mL / NET: 360 mL        PHYSICAL EXAM:  Vital Signs Last 24 Hrs  T(C): 37 (01 Jan 2022 15:31), Max: 37 (01 Jan 2022 05:52)  T(F): 98.6 (01 Jan 2022 15:31), Max: 98.6 (01 Jan 2022 05:52)  HR: 60 (01 Jan 2022 15:31) (60 - 80)  BP: 120/80 (01 Jan 2022 15:31) (97/52 - 129/61)  BP(mean): --  RR: 17 (01 Jan 2022 15:31) (17 - 18)  SpO2: 99% (01 Jan 2022 15:31) (93% - 99%)    Gen: NAD; resting in bed. awake and alert.   Pulm: no respiratory distress; no accessory muscle use; CTA b/l; no wheezing; no crackles.   Cards: RRR, nl S1/S2; no obvious murmurs; no LE edema; no JVD  Abd: soft; NT on exam; +bs  Ext: no cyanosis; no joint effusion; no joint pain on palpation.  Skin: no rash; no cyanosis    LABS:                        14.4   9.97  )-----------( 286      ( 31 Dec 2021 07:29 )             43.6     12-31    140  |  101  |  23  ----------------------------<  87  3.4<L>   |  19<L>  |  0.89    Ca    9.6      31 Dec 2021 07:29  Phos  3.2     12-31  Mg     2.20     12-31                  RADIOLOGY & ADDITIONAL TESTS:  Results Reviewed: Y  Imaging Personally Reviewed: Y  Electrocardiogram Personally Reviewed: Y    COORDINATION OF CARE:  Care Discussed with Consultants/Other Providers [Y/N]: Y  Prior or Outpatient Records Reviewed [Y/N]: Y

## 2022-01-02 LAB
ANION GAP SERPL CALC-SCNC: 17 MMOL/L — HIGH (ref 7–14)
BUN SERPL-MCNC: 28 MG/DL — HIGH (ref 7–23)
CALCIUM SERPL-MCNC: 9.3 MG/DL — SIGNIFICANT CHANGE UP (ref 8.4–10.5)
CHLORIDE SERPL-SCNC: 103 MMOL/L — SIGNIFICANT CHANGE UP (ref 98–107)
CO2 SERPL-SCNC: 20 MMOL/L — LOW (ref 22–31)
CREAT SERPL-MCNC: 0.75 MG/DL — SIGNIFICANT CHANGE UP (ref 0.5–1.3)
GLUCOSE SERPL-MCNC: 90 MG/DL — SIGNIFICANT CHANGE UP (ref 70–99)
HCT VFR BLD CALC: 39.1 % — SIGNIFICANT CHANGE UP (ref 34.5–45)
HGB BLD-MCNC: 12.8 G/DL — SIGNIFICANT CHANGE UP (ref 11.5–15.5)
MAGNESIUM SERPL-MCNC: 2.2 MG/DL — SIGNIFICANT CHANGE UP (ref 1.6–2.6)
MCHC RBC-ENTMCNC: 29.6 PG — SIGNIFICANT CHANGE UP (ref 27–34)
MCHC RBC-ENTMCNC: 32.7 GM/DL — SIGNIFICANT CHANGE UP (ref 32–36)
MCV RBC AUTO: 90.3 FL — SIGNIFICANT CHANGE UP (ref 80–100)
NRBC # BLD: 0 /100 WBCS — SIGNIFICANT CHANGE UP
NRBC # FLD: 0 K/UL — SIGNIFICANT CHANGE UP
PHOSPHATE SERPL-MCNC: 3.2 MG/DL — SIGNIFICANT CHANGE UP (ref 2.5–4.5)
PLATELET # BLD AUTO: 356 K/UL — SIGNIFICANT CHANGE UP (ref 150–400)
POTASSIUM SERPL-MCNC: 3.6 MMOL/L — SIGNIFICANT CHANGE UP (ref 3.5–5.3)
POTASSIUM SERPL-SCNC: 3.6 MMOL/L — SIGNIFICANT CHANGE UP (ref 3.5–5.3)
RBC # BLD: 4.33 M/UL — SIGNIFICANT CHANGE UP (ref 3.8–5.2)
RBC # FLD: 13.2 % — SIGNIFICANT CHANGE UP (ref 10.3–14.5)
SODIUM SERPL-SCNC: 140 MMOL/L — SIGNIFICANT CHANGE UP (ref 135–145)
WBC # BLD: 10.45 K/UL — SIGNIFICANT CHANGE UP (ref 3.8–10.5)
WBC # FLD AUTO: 10.45 K/UL — SIGNIFICANT CHANGE UP (ref 3.8–10.5)

## 2022-01-02 PROCEDURE — 99232 SBSQ HOSP IP/OBS MODERATE 35: CPT

## 2022-01-02 RX ADMIN — Medication 650 MILLIGRAM(S): at 05:50

## 2022-01-02 RX ADMIN — ENOXAPARIN SODIUM 40 MILLIGRAM(S): 100 INJECTION SUBCUTANEOUS at 12:16

## 2022-01-02 RX ADMIN — Medication 650 MILLIGRAM(S): at 07:10

## 2022-01-02 RX ADMIN — Medication 50 MICROGRAM(S): at 05:51

## 2022-01-02 RX ADMIN — MIRTAZAPINE 15 MILLIGRAM(S): 45 TABLET, ORALLY DISINTEGRATING ORAL at 20:45

## 2022-01-02 RX ADMIN — ATORVASTATIN CALCIUM 20 MILLIGRAM(S): 80 TABLET, FILM COATED ORAL at 20:45

## 2022-01-02 RX ADMIN — SENNA PLUS 1 TABLET(S): 8.6 TABLET ORAL at 20:45

## 2022-01-02 RX ADMIN — SERTRALINE 100 MILLIGRAM(S): 25 TABLET, FILM COATED ORAL at 12:16

## 2022-01-02 NOTE — PROGRESS NOTE ADULT - ASSESSMENT
89 y/o F, PMH of dementia, HTN, HLD, Hypothyroidism, MDD, Constipation, Breast Cancer (s/p lymph node removal and radiation ~30 years ago), Takayasu Arteritis, admitted for fall and concern for worsening dementia vs ativan induced. now improved and off Ativan taper. refusing to participate in PT for discharge planning.

## 2022-01-02 NOTE — PROGRESS NOTE ADULT - PROBLEM SELECTOR PLAN 1
Concern for worsening dementia with behavioral disturbances based on collateral provided by pt's son.  Robert body Dementia ?  - CT head negative for acute pathology  - According to ISTOP (Reference #: [id number]), pt has been regularly prescribed Xanax 0.5 mg qid PRN by her PCP and was last dispensed on 11/30/21. Per patient she has been taking 2 tabs in AM and 2 tabs in PM for years  - Frequent re-orientation  - Ensure proper sleep-wake cycle  - Avoid anticholinergics  - xanax taper as outlined by psych  - Aspiration precautions  - PT eval for discharge. if patient continues to refuse PT, then will not be candidate for rehab. 24h HHA vs LTC. f/u with family and CM for discharge planning.

## 2022-01-03 PROCEDURE — 99232 SBSQ HOSP IP/OBS MODERATE 35: CPT

## 2022-01-03 RX ORDER — HALOPERIDOL DECANOATE 100 MG/ML
0.5 INJECTION INTRAMUSCULAR EVERY 6 HOURS
Refills: 0 | Status: DISCONTINUED | OUTPATIENT
Start: 2022-01-03 | End: 2022-01-11

## 2022-01-03 RX ORDER — HALOPERIDOL DECANOATE 100 MG/ML
0.5 INJECTION INTRAMUSCULAR ONCE
Refills: 0 | Status: DISCONTINUED | OUTPATIENT
Start: 2022-01-03 | End: 2022-01-04

## 2022-01-03 RX ORDER — ALPRAZOLAM 0.25 MG
0.25 TABLET ORAL EVERY 6 HOURS
Refills: 0 | Status: DISCONTINUED | OUTPATIENT
Start: 2022-01-03 | End: 2022-01-04

## 2022-01-03 RX ADMIN — SERTRALINE 100 MILLIGRAM(S): 25 TABLET, FILM COATED ORAL at 11:14

## 2022-01-03 RX ADMIN — Medication 650 MILLIGRAM(S): at 07:30

## 2022-01-03 RX ADMIN — Medication 0.25 MILLIGRAM(S): at 15:02

## 2022-01-03 RX ADMIN — Medication 50 MICROGRAM(S): at 06:33

## 2022-01-03 RX ADMIN — Medication 0.25 MILLIGRAM(S): at 22:39

## 2022-01-03 RX ADMIN — ATORVASTATIN CALCIUM 20 MILLIGRAM(S): 80 TABLET, FILM COATED ORAL at 22:38

## 2022-01-03 RX ADMIN — ENOXAPARIN SODIUM 40 MILLIGRAM(S): 100 INJECTION SUBCUTANEOUS at 11:14

## 2022-01-03 RX ADMIN — Medication 650 MILLIGRAM(S): at 06:34

## 2022-01-03 RX ADMIN — MIRTAZAPINE 15 MILLIGRAM(S): 45 TABLET, ORALLY DISINTEGRATING ORAL at 22:38

## 2022-01-03 RX ADMIN — HALOPERIDOL DECANOATE 0.5 MILLIGRAM(S): 100 INJECTION INTRAMUSCULAR at 16:26

## 2022-01-03 NOTE — SWALLOW BEDSIDE ASSESSMENT ADULT - SWALLOW EVAL: RECOMMENDED DIET
1) Defer PO diet to MD given patient refusal 2) Consider short-term non-oral means of nutrition
Soft and bite sized solids with thin liquids, as tolerated

## 2022-01-03 NOTE — CHART NOTE - NSCHARTNOTEFT_GEN_A_CORE
Patient seen as called by primary team patient w/ increased anxiety and now exhibiting paranoia about the police who are out to get her and her  laundering/stealing money - Questioning BZD withdrawal as patient was tapered off Xanax 12/30 and has been on PRN - CUrrently not tachycardic, hypertensive, tremulous, or diaphoretic - Recommend to continue Xanax 0.25mg PO TID PRN for anxiety and use Haldol 0.5mg PO/IV/IM q 6 hours PRN for agitation/paranoia - Psychiatry to follow. Discussed w/ Dr. Mcneal.

## 2022-01-03 NOTE — DIETITIAN INITIAL EVALUATION ADULT. - ADD RECOMMEND
1. Repeat Swallow Bedside Assessment Adult &/or MBS, if needed, to determine appropriate PO diet & fluid consistency;    2. Add PO supplement: Ensure Enlive 8oz. 2x daily (~700 Kcal, ~40 gm Protein) to diet rx;     3. Monitor labs, hydration status;

## 2022-01-03 NOTE — DIETITIAN INITIAL EVALUATION ADULT. - OTHER INFO
Pt 89 yo female with PMHx of dementia, HTN, HLD, Hypothyroidism, MDD, constipation, Breast Cancer (s/p lymph node removal and radiation ~30 years ago), Takayasu Arteritis, admitted for fall and concern for worsening dementia - per chart review.     At time of visit, Pt awake, but somewhat confused. Per nurse, Pt ate <75% of breakfast this morning; Pt needs assistance with meals. S/P Swallow Bedside Assessment Adult (26-Dec-2021), SLP rec: defer PO diet to MD given Pt refusal. Will recommend to repeat Speech & Swallow Evaluation to determine appropriate PO diet & fluid consistency. No report of vomiting or diarrhea @ this time. +Fecal incontinence, per flow sheet. Unable to discuss weight history @ present. Nutrition focused physical exam conducted; Pt found with signs of moderate subcutaneous fat loss & moderate muscle wasting. Will recommend to add PO supplement: Ensure Enlive - 2x daily to diet rx to help optimizing nutrition. No report of pressure injury at present, Case discussed with nurse. RDN remains available.

## 2022-01-03 NOTE — PROGRESS NOTE ADULT - PROBLEM SELECTOR PLAN 1
Concern for worsening dementia with behavioral disturbances based on collateral provided by pt's son.  Robert body Dementia ?  - CT head negative for acute pathology  - According to ISTOP (Reference #: [id number]), pt has been regularly prescribed Xanax 0.5 mg qid PRN by her PCP and was last dispensed on 11/30/21. Per patient she has been taking 2 tabs in AM and 2 tabs in PM for years  - Frequent re-orientation  - Ensure proper sleep-wake cycle  - Avoid anticholinergics  - xanax taper as outlined by psych  - Aspiration precautions  - PT eval for discharge. if patient continues to refuse PT, then will not be candidate for rehab. 24h HHA vs LTC. f/u with family and CM for discharge planning. Concern for worsening dementia with behavioral disturbances based on collateral provided by pt's son.  Robert body Dementia ?  - CT head negative for acute pathology  - According to ISTOP (Reference #: [id number]), pt has been regularly prescribed Xanax 0.5 mg qid PRN by her PCP and was last dispensed on 11/30/21. Per patient she has been taking 2 tabs in AM and 2 tabs in PM for years  - Frequent re-orientation  - Ensure proper sleep-wake cycle  - Avoid anticholinergics  - xanax tapered off as recommended by psych but had been on for years, concerned for any component of BZD withdrawal... - d/w psych c/w xanax PRN anxiety (will change to q6 PRN for tonight), added Haldol PRN for paranoia - psych will f/u in am

## 2022-01-03 NOTE — SWALLOW BEDSIDE ASSESSMENT ADULT - ADDITIONAL RECOMMENDATIONS
Reconsult this department as patient medically optimizes/is more acceptable to PO trials for candidacy of oral diet.
1. Monitor any changes in neurological status that may impact oral intake. 2. Reconsult this department as needed

## 2022-01-03 NOTE — DIETITIAN NUTRITION RISK NOTIFICATION - TREATMENT: THE FOLLOWING DIET HAS BEEN RECOMMENDED
Diet, Pureed:   DASH/TLC {Sodium & Cholesterol Restricted} (DASH)  Mildly Thick Liquids (MILDTHICKLIQS) (12-25-21 @ 09:39) [Active]

## 2022-01-03 NOTE — PROGRESS NOTE ADULT - SUBJECTIVE AND OBJECTIVE BOX
[facility] Division of Hospital Medicine  Gloria Chisholm MD  Pager (M-F, 8A-5P):  In-house pager [phone]; Long-range pager [phone]  Other Times:  Please page Hospitalist in Charge -  In-house pager [phone]    Patient is a 89y old  Female who presents with a chief complaint of Fall, dementia (02 Jan 2022 15:41)    SUBJECTIVE / OVERNIGHT EVENTS:  ....  ADDITIONAL REVIEW OF SYSTEMS:    MEDICATIONS  (STANDING):  atorvastatin 20 milliGRAM(s) Oral at bedtime  enoxaparin Injectable 40 milliGRAM(s) SubCutaneous daily  levothyroxine 50 MICROGram(s) Oral daily  mirtazapine 15 milliGRAM(s) Oral at bedtime  polyethylene glycol 3350 17 Gram(s) Oral daily  senna 1 Tablet(s) Oral at bedtime  sertraline 100 milliGRAM(s) Oral daily    MEDICATIONS  (PRN):  acetaminophen     Tablet .. 650 milliGRAM(s) Oral every 6 hours PRN Temp greater or equal to 38C (100.4F), Moderate Pain (4 - 6)  ALPRAZolam 0.25 milliGRAM(s) Oral three times a day PRN Anxiety  artificial  tears Solution 1 Drop(s) Both EYES daily PRN Dry Eyes    I&O's Summary    02 Jan 2022 07:01  -  03 Jan 2022 07:00  --------------------------------------------------------  IN: 0 mL / OUT: 2 mL / NET: -2 mL    PHYSICAL EXAM:  Vital Signs Last 24 Hrs  T(C): 37.1 (03 Jan 2022 06:28), Max: 37.1 (03 Jan 2022 06:28)  T(F): 98.8 (03 Jan 2022 06:28), Max: 98.8 (03 Jan 2022 06:28)  HR: 71 (03 Jan 2022 06:28) (67 - 71)  BP: 135/72 (03 Jan 2022 06:28) (125/62 - 135/72)  BP(mean): --  RR: 18 (03 Jan 2022 06:28) (18 - 18)  SpO2: 95% (03 Jan 2022 06:28) (95% - 99%)    Gen: NAD; resting in bed. awake and alert.   Pulm: no respiratory distress; no accessory muscle use; CTA b/l; no wheezing; no crackles.   Cards: RRR, nl S1/S2; no obvious murmurs; no LE edema; no JVD  Abd: soft; NT on exam; +bs  Ext: no cyanosis; no joint effusion; no joint pain on palpation.  Skin: no rash; no cyanosis    LABS:                        12.8   10.45 )-----------( 356      ( 02 Jan 2022 07:01 )             39.1     01-02    140  |  103  |  28<H>  ----------------------------<  90  3.6   |  20<L>  |  0.75    Ca    9.3      02 Jan 2022 07:01  Phos  3.2     01-02  Mg     2.20     01-02    RADIOLOGY & ADDITIONAL TESTS:  Results Reviewed:   Imaging Personally Reviewed:  Electrocardiogram Personally Reviewed:    COORDINATION OF CARE:  Care Discussed with Consultants/Other Providers [Y/N]: RN, SW, CM, ACP re overall care   Prior or Outpatient Records Reviewed [Y/N]:   [facility] Division of Hospital Medicine  Gloria Chisholm MD  Pager (M-F, 8A-5P):  In-house pager [phone]; Long-range pager [phone]  Other Times:  Please page Hospitalist in Charge -  In-house pager [phone]    Patient is a 89y old  Female who presents with a chief complaint of Fall, dementia (02 Jan 2022 15:41)    SUBJECTIVE / OVERNIGHT EVENTS:  Pt seen earlier, very paranoid, says she's afraid of her , asking for the police, seen calling the hospital .  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 650 milliGRAM(s) Oral every 6 hours PRN Temp greater or equal to 38C (100.4F), Moderate Pain (4 - 6)  ALPRAZolam 0.25 milliGRAM(s) Oral three times a day PRN Anxiety  artificial  tears Solution 1 Drop(s) Both EYES daily PRN Dry Eyes    I&O's Summary    02 Jan 2022 07:01  -  03 Jan 2022 07:00  --------------------------------------------------------  IN: 0 mL / OUT: 2 mL / NET: -2 mL    PHYSICAL EXAM:  Vital Signs Last 24 Hrs  T(C): 37.1 (03 Jan 2022 06:28), Max: 37.1 (03 Jan 2022 06:28)  T(F): 98.8 (03 Jan 2022 06:28), Max: 98.8 (03 Jan 2022 06:28)  HR: 71 (03 Jan 2022 06:28) (67 - 71)  BP: 135/72 (03 Jan 2022 06:28) (125/62 - 135/72)  BP(mean): --  RR: 18 (03 Jan 2022 06:28) (18 - 18)  SpO2: 95% (03 Jan 2022 06:28) (95% - 99%)    Gen: NAD; resting in bed. awake and alert.   Pulm: no respiratory distress; no accessory muscle use; CTA b/l; no wheezing; no crackles.   Cards: RRR, nl S1/S2; no obvious murmurs; no LE edema; no JVD  Abd: soft; NT on exam; +bs  Ext: no cyanosis; no joint effusion; no joint pain on palpation.  Skin: no rash; no cyanosis  PSYCH: paranoid, oriented self, hospital, 2020, Dec    LABS:                        12.8   10.45 )-----------( 356      ( 02 Jan 2022 07:01 )             39.1     01-02    140  |  103  |  28<H>  ----------------------------<  90  3.6   |  20<L>  |  0.75    Ca    9.3      02 Jan 2022 07:01  Phos  3.2     01-02  Mg     2.20     01-02    RADIOLOGY & ADDITIONAL TESTS:  Results Reviewed:   Imaging Personally Reviewed:  Electrocardiogram Personally Reviewed:    COORDINATION OF CARE:  Care Discussed with Consultants/Other Providers [Y/N]: RN, SW, CM, ACP re overall care   Prior or Outpatient Records Reviewed [Y/N]:

## 2022-01-03 NOTE — SWALLOW BEDSIDE ASSESSMENT ADULT - ASR SWALLOW RECOMMEND DIAG
Objective testing NOT warranted at this time given clinical presentation as stated above
Cinesophagram NOT warranted given clinical presentation as stated above

## 2022-01-03 NOTE — SWALLOW BEDSIDE ASSESSMENT ADULT - SWALLOW EVAL: DIAGNOSIS
1. Patient demonstrated a functional oral stage for thin liquids marked by adequate oral acceptance, collection and transport. 2. Patient demonstrated a mild oral dysphagia for regular solids marked by prolonged chewing with adequate bolus collection and transport. 3. Patient demonstrated a functional pharyngeal phase of regular solids with thin liquids marked by a present pharyngeal swallow trigger with hyolaryngeal elevation noted upon digital palpation without evidence of airway penetration/aspiration.
Patient presented with puree via teaspoon, however, patient refused by maintaining tight jaw lock and stating "No, I want to go home", despite maximum encouragement given by clinician and RN. Oral/pharyngeal phase of swallow could not be adequately assessed.

## 2022-01-03 NOTE — PROGRESS NOTE ADULT - ASSESSMENT
89 y/o F, PMH of dementia, HTN, HLD, Hypothyroidism, MDD, Constipation, Breast Cancer (s/p lymph node removal and radiation ~30 years ago), Takayasu Arteritis, admitted for fall and concern for worsening dementia vs ativan induced. now improved and off Ativan taper. refusing to participate in PT for discharge planning.  89 y/o F, PMH of dementia, HTN, HLD, Hypothyroidism, MDD, Constipation, Breast Cancer (s/p lymph node removal and radiation ~30 years ago), Takayasu Arteritis, admitted for fall and concern for worsening dementia vs ativan induced. now improved and off Ativan taper.   Very paranoid, has been tapered off benzos, last dose 12/30...

## 2022-01-04 LAB
ANION GAP SERPL CALC-SCNC: 13 MMOL/L — SIGNIFICANT CHANGE UP (ref 7–14)
BUN SERPL-MCNC: 25 MG/DL — HIGH (ref 7–23)
CALCIUM SERPL-MCNC: 9.3 MG/DL — SIGNIFICANT CHANGE UP (ref 8.4–10.5)
CHLORIDE SERPL-SCNC: 105 MMOL/L — SIGNIFICANT CHANGE UP (ref 98–107)
CO2 SERPL-SCNC: 21 MMOL/L — LOW (ref 22–31)
CREAT SERPL-MCNC: 0.69 MG/DL — SIGNIFICANT CHANGE UP (ref 0.5–1.3)
GLUCOSE SERPL-MCNC: 101 MG/DL — HIGH (ref 70–99)
HCT VFR BLD CALC: 39 % — SIGNIFICANT CHANGE UP (ref 34.5–45)
HGB BLD-MCNC: 12.7 G/DL — SIGNIFICANT CHANGE UP (ref 11.5–15.5)
MAGNESIUM SERPL-MCNC: 2 MG/DL — SIGNIFICANT CHANGE UP (ref 1.6–2.6)
MCHC RBC-ENTMCNC: 29.5 PG — SIGNIFICANT CHANGE UP (ref 27–34)
MCHC RBC-ENTMCNC: 32.6 GM/DL — SIGNIFICANT CHANGE UP (ref 32–36)
MCV RBC AUTO: 90.7 FL — SIGNIFICANT CHANGE UP (ref 80–100)
NRBC # BLD: 0 /100 WBCS — SIGNIFICANT CHANGE UP
NRBC # FLD: 0 K/UL — SIGNIFICANT CHANGE UP
PHOSPHATE SERPL-MCNC: 2.7 MG/DL — SIGNIFICANT CHANGE UP (ref 2.5–4.5)
PLATELET # BLD AUTO: 350 K/UL — SIGNIFICANT CHANGE UP (ref 150–400)
POTASSIUM SERPL-MCNC: 3.3 MMOL/L — LOW (ref 3.5–5.3)
POTASSIUM SERPL-SCNC: 3.3 MMOL/L — LOW (ref 3.5–5.3)
RBC # BLD: 4.3 M/UL — SIGNIFICANT CHANGE UP (ref 3.8–5.2)
RBC # FLD: 13.2 % — SIGNIFICANT CHANGE UP (ref 10.3–14.5)
SODIUM SERPL-SCNC: 139 MMOL/L — SIGNIFICANT CHANGE UP (ref 135–145)
WBC # BLD: 9.43 K/UL — SIGNIFICANT CHANGE UP (ref 3.8–10.5)
WBC # FLD AUTO: 9.43 K/UL — SIGNIFICANT CHANGE UP (ref 3.8–10.5)

## 2022-01-04 PROCEDURE — 99232 SBSQ HOSP IP/OBS MODERATE 35: CPT

## 2022-01-04 RX ORDER — ALPRAZOLAM 0.25 MG
0.25 TABLET ORAL
Refills: 0 | Status: DISCONTINUED | OUTPATIENT
Start: 2022-01-04 | End: 2022-01-04

## 2022-01-04 RX ORDER — POTASSIUM CHLORIDE 20 MEQ
40 PACKET (EA) ORAL ONCE
Refills: 0 | Status: COMPLETED | OUTPATIENT
Start: 2022-01-04 | End: 2022-01-04

## 2022-01-04 RX ORDER — ALPRAZOLAM 0.25 MG
0.25 TABLET ORAL
Refills: 0 | Status: DISCONTINUED | OUTPATIENT
Start: 2022-01-04 | End: 2022-01-06

## 2022-01-04 RX ADMIN — MIRTAZAPINE 15 MILLIGRAM(S): 45 TABLET, ORALLY DISINTEGRATING ORAL at 22:30

## 2022-01-04 RX ADMIN — Medication 0.25 MILLIGRAM(S): at 17:08

## 2022-01-04 RX ADMIN — Medication 0.25 MILLIGRAM(S): at 13:41

## 2022-01-04 RX ADMIN — Medication 50 MICROGRAM(S): at 05:19

## 2022-01-04 RX ADMIN — ATORVASTATIN CALCIUM 20 MILLIGRAM(S): 80 TABLET, FILM COATED ORAL at 22:30

## 2022-01-04 RX ADMIN — ENOXAPARIN SODIUM 40 MILLIGRAM(S): 100 INJECTION SUBCUTANEOUS at 11:58

## 2022-01-04 NOTE — PROGRESS NOTE ADULT - SUBJECTIVE AND OBJECTIVE BOX
[facility] Division of Hospital Medicine  Gloria Chisholm MD  Pager (M-F, 8A-5P):  In-house pager [phone]; Long-range pager [phone]  Other Times:  Please page Hospitalist in Charge -  In-house pager [phone]    Patient is a 89y old  Female who presents with a chief complaint of Fall, dementia (03 Jan 2022 08:12)    SUBJECTIVE / OVERNIGHT EVENTS:  ....  ADDITIONAL REVIEW OF SYSTEMS:    MEDICATIONS  (STANDING):  atorvastatin 20 milliGRAM(s) Oral at bedtime  enoxaparin Injectable 40 milliGRAM(s) SubCutaneous daily  haloperidol     Tablet 0.5 milliGRAM(s) Oral once  levothyroxine 50 MICROGram(s) Oral daily  mirtazapine 15 milliGRAM(s) Oral at bedtime  polyethylene glycol 3350 17 Gram(s) Oral daily  potassium chloride    Tablet ER 40 milliEquivalent(s) Oral once  senna 1 Tablet(s) Oral at bedtime  sertraline 100 milliGRAM(s) Oral daily    MEDICATIONS  (PRN):  acetaminophen     Tablet .. 650 milliGRAM(s) Oral every 6 hours PRN Temp greater or equal to 38C (100.4F), Moderate Pain (4 - 6)  ALPRAZolam 0.25 milliGRAM(s) Oral every 6 hours PRN anxiety  artificial  tears Solution 1 Drop(s) Both EYES daily PRN Dry Eyes  haloperidol    Injectable 0.5 milliGRAM(s) IntraMuscular every 6 hours PRN paranoia    PHYSICAL EXAM:  Vital Signs Last 24 Hrs  T(C): 37 (04 Jan 2022 04:40), Max: 37 (04 Jan 2022 04:40)  T(F): 98.6 (04 Jan 2022 04:40), Max: 98.6 (04 Jan 2022 04:40)  HR: 60 (04 Jan 2022 04:40) (60 - 69)  BP: 136/60 (04 Jan 2022 04:40) (126/63 - 136/60)  BP(mean): --  RR: 17 (04 Jan 2022 04:40) (16 - 17)  SpO2: 95% (04 Jan 2022 04:40) (95% - 96%)    Gen: NAD; resting in bed. awake and alert.   Pulm: no respiratory distress; no accessory muscle use; CTA b/l; no wheezing; no crackles.   Cards: RRR, nl S1/S2; no obvious murmurs; no LE edema; no JVD  Abd: soft; NT on exam; +bs  Ext: no cyanosis; no joint effusion; no joint pain on palpation.  Skin: no rash; no cyanosis  PSYCH: paranoid, oriented self, [facility], 2020, Dec    LABS:                        12.7   9.43  )-----------( 350      ( 04 Jan 2022 06:50 )             39.0     01-04    139  |  105  |  25<H>  ----------------------------<  101<H>  3.3<L>   |  21<L>  |  0.69    Ca    9.3      04 Jan 2022 06:50  Phos  2.7     01-04  Mg     2.00     01-04    RADIOLOGY & ADDITIONAL TESTS:  Results Reviewed:   Imaging Personally Reviewed:  Electrocardiogram Personally Reviewed:    COORDINATION OF CARE:  Care Discussed with Consultants/Other Providers [Y/N]: RN, SW, CM, ACP, psych re overall care   Prior or Outpatient Records Reviewed [Y/N]:   [facility] Division of Hospital Medicine  Gloria Chisholm MD  Pager (M-F, 8A-5P):  In-house pager [phone]; Long-range pager [phone]  Other Times:  Please page Hospitalist in Charge -  In-house pager [phone]    Patient is a 89y old  Female who presents with a chief complaint of Fall, dementia (03 Jan 2022 08:12)    SUBJECTIVE / OVERNIGHT EVENTS:  Required PRN yesterday. Paranoid, loud, disorganized.  ADDITIONAL REVIEW OF SYSTEMS:    MEDICATIONS  (STANDING):  atorvastatin 20 milliGRAM(s) Oral at bedtime  enoxaparin Injectable 40 milliGRAM(s) SubCutaneous daily  haloperidol     Tablet 0.5 milliGRAM(s) Oral once  levothyroxine 50 MICROGram(s) Oral daily  mirtazapine 15 milliGRAM(s) Oral at bedtime  polyethylene glycol 3350 17 Gram(s) Oral daily  potassium chloride    Tablet ER 40 milliEquivalent(s) Oral once  senna 1 Tablet(s) Oral at bedtime  sertraline 100 milliGRAM(s) Oral daily    MEDICATIONS  (PRN):  acetaminophen     Tablet .. 650 milliGRAM(s) Oral every 6 hours PRN Temp greater or equal to 38C (100.4F), Moderate Pain (4 - 6)  ALPRAZolam 0.25 milliGRAM(s) Oral every 6 hours PRN anxiety  artificial  tears Solution 1 Drop(s) Both EYES daily PRN Dry Eyes  haloperidol    Injectable 0.5 milliGRAM(s) IntraMuscular every 6 hours PRN paranoia    PHYSICAL EXAM:  Vital Signs Last 24 Hrs  T(C): 37 (04 Jan 2022 04:40), Max: 37 (04 Jan 2022 04:40)  T(F): 98.6 (04 Jan 2022 04:40), Max: 98.6 (04 Jan 2022 04:40)  HR: 60 (04 Jan 2022 04:40) (60 - 69)  BP: 136/60 (04 Jan 2022 04:40) (126/63 - 136/60)  BP(mean): --  RR: 17 (04 Jan 2022 04:40) (16 - 17)  SpO2: 95% (04 Jan 2022 04:40) (95% - 96%)    Gen: NAD; resting in bed. awake and alert.   Pulm: no respiratory distress; no accessory muscle use; CTA b/l; no wheezing; no crackles.   Cards: RRR, nl S1/S2; no obvious murmurs; no LE edema; no JVD  Abd: soft; NT on exam; +bs  Ext: no cyanosis; no joint effusion; no joint pain on palpation.  Skin: no rash; no cyanosis  PSYCH: paranoid, loud, anxious    LABS:                        12.7   9.43  )-----------( 350      ( 04 Jan 2022 06:50 )             39.0     01-04    139  |  105  |  25<H>  ----------------------------<  101<H>  3.3<L>   |  21<L>  |  0.69    Ca    9.3      04 Jan 2022 06:50  Phos  2.7     01-04  Mg     2.00     01-04    RADIOLOGY & ADDITIONAL TESTS:  Results Reviewed:   Imaging Personally Reviewed:  Electrocardiogram Personally Reviewed:    COORDINATION OF CARE:  Care Discussed with Consultants/Other Providers [Y/N]: RN, SW, CM, ACP, psych re overall care   Prior or Outpatient Records Reviewed [Y/N]:

## 2022-01-04 NOTE — SWALLOW BEDSIDE ASSESSMENT ADULT - COMMENTS
Per progress note, 12/26/21, patient is a "89 y/o F, PMH of dementia, HTN, HLD, Hypothyroidism, MDD, Constipation, Breast Cancer (s/p lymph node removal and radiation ~30 years ago), Takayasu Arteritis, admitted for fall and concern for worsening dementia.".    WBC is WFL. Most recent CT of head revealed "No evidence of acute hemorrhage mass or mass effect". Most recent CXR revealed "Appearance of nonspecific patchy opacification in right perihilar region possibly from crowding of markings and/or confluence of shadows. Clear remaining visualized lungs. No pleural effusions or pneumothorax."    Patient seen at bedside this afternoon for an initial assessment of the swallow function, at which time patient was alert. Patient's food tray's present from breakfast/lunch uneaten. RN reporting patient receive Xanax and has been lethargic. Patient did not follow directives though opened eyes and was verbally responsive stating "I want to go home".
Spoke with ACP team, pager [phone]. Per team, repeat swallow assessment is not warranted at this time as patient was seen yesterday by this department and diet was advanced to soft and bite sized with thin liquids.
Per progress note 1/3/22, patient is a "89 y/o F, PMH of dementia, HTN, HLD, Hypothyroidism, MDD, Constipation, Breast Cancer (s/p lymph node removal and radiation ~30 years ago), Takayasu Arteritis, admitted for fall and concern for worsening dementia vs ativan induced. now improved and off Ativan taper. refusing to participate in PT for discharge planning."    WBC is WFL. Most recent CT of head revealed "No evidence of acute hemorrhage mass or mass effect". Most recent CXR revealed "Appearance of nonspecific patchy opacification in right perihilar region possibly from crowding of markings and/or confluence of shadows. Clear remaining visualized lungs. No pleural effusions or pneumothorax.".    Patient seen at bedside this afternoon for a reassessment of the swallow function, at which time patient was alert. Patient is known to this department as patient was seen for an initial assessment on 12/26/21 (please see full report), at which time patient refused and PO diet was deferred to MD. Patient did not follow directives, though accepted PO trials with encouragement. Patient verbally responsive during today's assessment.

## 2022-01-04 NOTE — PROGRESS NOTE ADULT - PROBLEM SELECTOR PLAN 1
Concern for worsening dementia with behavioral disturbances based on collateral provided by pt's son.  Robert body Dementia ?  - CT head negative for acute pathology  - According to ISTOP (Reference #: [id number]), pt has been regularly prescribed Xanax 0.5 mg qid PRN by her PCP and was last dispensed on 11/30/21. Per patient she has been taking 2 tabs in AM and 2 tabs in PM for years  - Frequent re-orientation  - Ensure proper sleep-wake cycle  - Avoid anticholinergics  - xanax tapered off as recommended by psych but had been on for years, concerned for any component of BZD withdrawal... - d/w psych c/w xanax PRN anxiety (will change to q6 PRN for tonight), added Haldol PRN for paranoia - psych will f/u in am Concern for worsening dementia with behavioral disturbances based on collateral provided by pt's son.  Robert body Dementia ?  - CT head negative for acute pathology  - According to ISTOP (Reference #: [id number]), pt has been regularly prescribed Xanax 0.5 mg qid PRN by her PCP and was last dispensed on 11/30/21. Per patient she has been taking 2 tabs in AM and 2 tabs in PM for years  - Frequent re-orientation  - Ensure proper sleep-wake cycle  - xanax tapered off as recommended by psych but had been on for years, concerned for any component of BZD withdrawal... - d/w psych c/w xanax PRN anxiety (will change to q6 PRN for tonight), added Haldol PRN for paranoia - psych will f/u in am --->>>> d/w psych will resume standing BZDs as concerned re BZD withdrawal - Xanax 0.25 bid, give Ativan 0.5 if he doesn't take the Xanax

## 2022-01-04 NOTE — BH CONSULTATION LIAISON PROGRESS NOTE - NSBHASSESSMENTFT_PSY_ALL_CORE
89 y/o F with PMH of dementia, HTN, HLD, Hypothyroidism, MDD, Constipation, Breast Cancer (s/p lymph node removal and radiation ~30 years ago), Takayasu Arteritis presented to ED by EMS w/ home health aide after a fall at home the previous night.     Patient confused and irritable though does not appear to have AH/VH, tremors, diaphoresis, n/v etc., likely not in benzo withdrawal however patient likely to be fall risk on benzos which may be worsening her overall impulse control as well. Dementia with likely superimposed delirium, would benefit from expeditious discharge as hospitalization itself is delirium risk for her.     Unable to reach family, some documentation about son's wishes to avoid antipsychotics due to BBB, will need to reach to discuss.  ISTOP - xanax 0.5mg 4x daily, had  been down to xanax 0.25mg TID on 12/26. Would avoid CIWA as patient likely to get over-medicated on this due to symptom overlap and lack of reliability.     12/28 - patient doing much better, less agitated and confused, tolerating xanax taper no withdrawal features  12/29 - patient still not agitated, appears minimally confused, no issues with xanax taper  no withdrawal issues, would c/w sertraline titration.   1/4 - patient loud, demanding to leave, and initially uncooperative but responsive to effort with patience/redirection. Recommend resuming standing Xanax due to concern for poor tolerance of full taper in patient with > 10 year history of chronic benzodiazepine use.    PLAN:  1) Restart Xanax 0.25mg PO BID  2) Can give Xanax 0.25mg PO PRN anxiety up to qD.  3) C/w Zoloft to 100mg QD  4) C/w Mirtazapine 15mg qHS as ordered   5) For refractory agitation, can use IM/IV/PO Haldol 0.5mg q6h PRN extreme agitation/dangerous combativeness.  6) Supportive treatment of delirium: 1) Minimize use of benzodiazepines, opioids, anticholinergics, and other deliriogenic agents whenever possible.  2) Maintain sleep wake cycle.  3) Provide frequent reorientation and redirection.  4) Family member at bedside if possible. 5) Provide corrective lenses if required.   7) No psychiatric contraindications to discharge. 89 y/o F with PMH of dementia, HTN, HLD, Hypothyroidism, MDD, Constipation, Breast Cancer (s/p lymph node removal and radiation ~30 years ago), Takayasu Arteritis presented to ED by EMS w/ home health aide after a fall at home the previous night.     Patient confused and irritable though does not appear to have AH/VH, tremors, diaphoresis, n/v etc., likely not in benzo withdrawal however patient likely to be fall risk on benzos which may be worsening her overall impulse control as well. Dementia with likely superimposed delirium, would benefit from expeditious discharge as hospitalization itself is delirium risk for her.     Unable to reach family, some documentation about son's wishes to avoid antipsychotics due to BBB, will need to reach to discuss.  ISTOP - xanax 0.5mg 4x daily, had  been down to xanax 0.25mg TID on 12/26. Would avoid CIWA as patient likely to get over-medicated on this due to symptom overlap and lack of reliability.     12/28 - patient doing much better, less agitated and confused, tolerating xanax taper no withdrawal features  12/29 - patient still not agitated, appears minimally confused, no issues with xanax taper  no withdrawal issues, would c/w sertraline titration.   1/4 - patient loud, demanding to leave, and initially uncooperative but responsive to effort with patience/redirection. Recommend resuming standing Xanax due to concern for poor tolerance of full taper in patient with > 10 year history of chronic benzodiazepine use.    PLAN:  1) Restart Xanax 0.25mg PO BID (HOLD for sedation, monitor pulse OX and respiratory status closely)  2) Can give Xanax 0.25mg PO PRN anxiety up to qD. If pt. cannot tolerate PO Xanax PRN due to SEVERE agitation, may use PRN Ativan 0.25mg IV/IM   3) C/w Zoloft to 100mg QD  4) C/w Mirtazapine 15mg qHS as ordered   5) For refractory agitation, can use IM/IV/PO Haldol 0.5mg q6h PRN extreme agitation/dangerous combativeness, if qtc <500.  6) Supportive treatment of delirium: 1) Minimize use of benzodiazepines, opioids, anticholinergics, and other deliriogenic agents whenever possible.  2) Maintain sleep wake cycle.  3) Provide frequent reorientation and redirection.  4) Family member at bedside if possible. 5) Provide corrective lenses if required.   7) No psychiatric contraindications to discharge.

## 2022-01-04 NOTE — BH CONSULTATION LIAISON PROGRESS NOTE - NSBHCONSULTFOLLOWAFTERCARE_PSY_A_CORE FT
[facility] saeed psych [phone] [facility] saeed psych [phone]  [facility]. walk in clinic : [phone]

## 2022-01-04 NOTE — BH CONSULTATION LIAISON PROGRESS NOTE - NSBHCHARTREVIEWVS_PSY_A_CORE FT
Vital Signs Last 24 Hrs  T(C): 36.9 (28 Dec 2021 05:12), Max: 37 (27 Dec 2021 21:30)  T(F): 98.5 (28 Dec 2021 05:12), Max: 98.6 (27 Dec 2021 21:30)  HR: 70 (28 Dec 2021 05:12) (70 - 73)  BP: 120/60 (28 Dec 2021 05:12) (108/58 - 130/63)  BP(mean): --  RR: 17 (28 Dec 2021 05:12) (17 - 18)  SpO2: 95% (28 Dec 2021 05:12) (95% - 95%)
Vital Signs Last 24 Hrs  T(C): 37.1 (29 Dec 2021 05:22), Max: 37.1 (29 Dec 2021 05:22)  T(F): 98.8 (29 Dec 2021 05:22), Max: 98.8 (29 Dec 2021 05:22)  HR: 84 (29 Dec 2021 05:22) (73 - 84)  BP: 142/74 (29 Dec 2021 05:22) (141/60 - 142/74)  BP(mean): --  RR: 18 (29 Dec 2021 05:22) (17 - 18)  SpO2: 95% (29 Dec 2021 05:22) (95% - 97%)
Vital Signs Last 24 Hrs  T(C): 37 (04 Jan 2022 04:40), Max: 37 (04 Jan 2022 04:40)  T(F): 98.6 (04 Jan 2022 04:40), Max: 98.6 (04 Jan 2022 04:40)  HR: 60 (04 Jan 2022 04:40) (60 - 69)  BP: 136/60 (04 Jan 2022 04:40) (126/63 - 136/60)  BP(mean): --  RR: 17 (04 Jan 2022 04:40) (16 - 17)  SpO2: 95% (04 Jan 2022 04:40) (95% - 96%)

## 2022-01-04 NOTE — BH CONSULTATION LIAISON PROGRESS NOTE - CASE SUMMARY
Chart reviewed, pt. seen and evaluated with Dr. Milligan, I agree with above assessment and plan, pt. minimally engaging, irritable, however cooperativity improves with interview. Patient made some vague paranoid statements during an interview, no SI or HI, plan as above, case d/w Dr. Chisholm, will follow

## 2022-01-04 NOTE — BH CONSULTATION LIAISON PROGRESS NOTE - NSBHFUPINTERVALHXFT_PSY_A_CORE
Chart reviewed. Received IM Haldol 0.5mg x 1 PRN agitation yesterday and Xanax 0.25mg PO x 1 PRN anxiety overnight.    Patient seen and examined. Loud and minimally cooperative, although cooperativity improves with interview. Disoriented to date and only vaguely oriented to place and situation. Notably paranoid with some suspected delusions (that she is being threatened). Reports she feels overall well although her "arms and legs hurt." Begins shouting and asking to be discharged from hospital when asked about mood, sleep, etc. No apparent hallucinations.  Chart reviewed. Received IM Haldol 0.5mg x 1 PRN agitation yesterday and Xanax 0.25mg PO x 2 PRN anxiety.     Patient seen and examined. Loud and minimally cooperative, although cooperativity improves with interview. Disoriented to date and only vaguely oriented to place and situation. Notably paranoid with some suspected delusions (that she is being threatened). Reports she feels overall well although her "arms and legs hurt." Begins shouting and asking to be discharged from hospital when asked about mood, sleep, etc. No apparent hallucinations. Denies SI and HI.

## 2022-01-04 NOTE — PROGRESS NOTE ADULT - ASSESSMENT
89 y/o F, PMH of dementia, HTN, HLD, Hypothyroidism, MDD, Constipation, Breast Cancer (s/p lymph node removal and radiation ~30 years ago), Takayasu Arteritis, admitted for fall and concern for worsening dementia vs ativan induced. now improved and off Ativan taper.   Very paranoid, has been tapered off benzos, last dose 12/30... 89 y/o F, PMH of dementia, HTN, HLD, Hypothyroidism, MDD, Constipation, Breast Cancer (s/p lymph node removal and radiation ~30 years ago), Takayasu Arteritis, admitted for fall and concern for worsening dementia vs ativan induced. now improved and off Ativan taper.   Very paranoid, has been tapered off benzos, last dose 12/30...
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
## 2022-01-04 NOTE — BH CONSULTATION LIAISON PROGRESS NOTE - CURRENT MEDICATION
MEDICATIONS  (STANDING):  ALPRAZolam 0.25 milliGRAM(s) Oral two times a day  atorvastatin 20 milliGRAM(s) Oral at bedtime  enoxaparin Injectable 40 milliGRAM(s) SubCutaneous daily  levothyroxine 50 MICROGram(s) Oral daily  mirtazapine 15 milliGRAM(s) Oral at bedtime  polyethylene glycol 3350 17 Gram(s) Oral daily  senna 1 Tablet(s) Oral at bedtime  sertraline 75 milliGRAM(s) Oral daily    MEDICATIONS  (PRN):  acetaminophen     Tablet .. 650 milliGRAM(s) Oral every 6 hours PRN Temp greater or equal to 38C (100.4F), Moderate Pain (4 - 6)  ALPRAZolam 0.25 milliGRAM(s) Oral three times a day PRN Anxiety  artificial  tears Solution 1 Drop(s) Both EYES daily PRN Dry Eyes  
MEDICATIONS  (STANDING):  ALPRAZolam 0.25 milliGRAM(s) Oral <User Schedule>  atorvastatin 20 milliGRAM(s) Oral at bedtime  enoxaparin Injectable 40 milliGRAM(s) SubCutaneous daily  levothyroxine 50 MICROGram(s) Oral daily  mirtazapine 15 milliGRAM(s) Oral at bedtime  polyethylene glycol 3350 17 Gram(s) Oral daily  senna 1 Tablet(s) Oral at bedtime  sertraline 50 milliGRAM(s) Oral daily    MEDICATIONS  (PRN):  acetaminophen     Tablet .. 650 milliGRAM(s) Oral every 6 hours PRN Temp greater or equal to 38C (100.4F), Moderate Pain (4 - 6)  ALPRAZolam 0.25 milliGRAM(s) Oral three times a day PRN Anxiety  artificial  tears Solution 1 Drop(s) Both EYES daily PRN Dry Eyes  
MEDICATIONS  (STANDING):  atorvastatin 20 milliGRAM(s) Oral at bedtime  enoxaparin Injectable 40 milliGRAM(s) SubCutaneous daily  haloperidol     Tablet 0.5 milliGRAM(s) Oral once  levothyroxine 50 MICROGram(s) Oral daily  mirtazapine 15 milliGRAM(s) Oral at bedtime  polyethylene glycol 3350 17 Gram(s) Oral daily  potassium chloride    Tablet ER 40 milliEquivalent(s) Oral once  senna 1 Tablet(s) Oral at bedtime  sertraline 100 milliGRAM(s) Oral daily    MEDICATIONS  (PRN):  acetaminophen     Tablet .. 650 milliGRAM(s) Oral every 6 hours PRN Temp greater or equal to 38C (100.4F), Moderate Pain (4 - 6)  ALPRAZolam 0.25 milliGRAM(s) Oral every 6 hours PRN anxiety  artificial  tears Solution 1 Drop(s) Both EYES daily PRN Dry Eyes  haloperidol    Injectable 0.5 milliGRAM(s) IntraMuscular every 6 hours PRN paranoia

## 2022-01-05 DIAGNOSIS — N39.0 URINARY TRACT INFECTION, SITE NOT SPECIFIED: ICD-10-CM

## 2022-01-05 DIAGNOSIS — R19.7 DIARRHEA, UNSPECIFIED: ICD-10-CM

## 2022-01-05 LAB
ANION GAP SERPL CALC-SCNC: 14 MMOL/L — SIGNIFICANT CHANGE UP (ref 7–14)
APPEARANCE UR: ABNORMAL
BACTERIA # UR AUTO: ABNORMAL
BILIRUB UR-MCNC: NEGATIVE — SIGNIFICANT CHANGE UP
BUN SERPL-MCNC: 25 MG/DL — HIGH (ref 7–23)
CALCIUM SERPL-MCNC: 9.6 MG/DL — SIGNIFICANT CHANGE UP (ref 8.4–10.5)
CHLORIDE SERPL-SCNC: 105 MMOL/L — SIGNIFICANT CHANGE UP (ref 98–107)
CO2 SERPL-SCNC: 23 MMOL/L — SIGNIFICANT CHANGE UP (ref 22–31)
COLOR SPEC: ABNORMAL
CREAT SERPL-MCNC: 0.73 MG/DL — SIGNIFICANT CHANGE UP (ref 0.5–1.3)
CULTURE RESULTS: SIGNIFICANT CHANGE UP
DIFF PNL FLD: ABNORMAL
EPI CELLS # UR: 3 /HPF — SIGNIFICANT CHANGE UP (ref 0–5)
GLUCOSE SERPL-MCNC: 99 MG/DL — SIGNIFICANT CHANGE UP (ref 70–99)
GLUCOSE UR QL: NEGATIVE — SIGNIFICANT CHANGE UP
HCT VFR BLD CALC: 38.3 % — SIGNIFICANT CHANGE UP (ref 34.5–45)
HGB BLD-MCNC: 12 G/DL — SIGNIFICANT CHANGE UP (ref 11.5–15.5)
HYALINE CASTS # UR AUTO: SIGNIFICANT CHANGE UP /LPF (ref 0–7)
KETONES UR-MCNC: ABNORMAL
LEUKOCYTE ESTERASE UR-ACNC: ABNORMAL
MAGNESIUM SERPL-MCNC: 1.9 MG/DL — SIGNIFICANT CHANGE UP (ref 1.6–2.6)
MCHC RBC-ENTMCNC: 29.3 PG — SIGNIFICANT CHANGE UP (ref 27–34)
MCHC RBC-ENTMCNC: 31.3 GM/DL — LOW (ref 32–36)
MCV RBC AUTO: 93.6 FL — SIGNIFICANT CHANGE UP (ref 80–100)
NITRITE UR-MCNC: POSITIVE
NRBC # BLD: 0 /100 WBCS — SIGNIFICANT CHANGE UP
NRBC # FLD: 0 K/UL — SIGNIFICANT CHANGE UP
PH UR: 7.5 — SIGNIFICANT CHANGE UP (ref 5–8)
PHOSPHATE SERPL-MCNC: 2.6 MG/DL — SIGNIFICANT CHANGE UP (ref 2.5–4.5)
PLATELET # BLD AUTO: 360 K/UL — SIGNIFICANT CHANGE UP (ref 150–400)
POTASSIUM SERPL-MCNC: 3.4 MMOL/L — LOW (ref 3.5–5.3)
POTASSIUM SERPL-SCNC: 3.4 MMOL/L — LOW (ref 3.5–5.3)
PROT UR-MCNC: ABNORMAL
RBC # BLD: 4.09 M/UL — SIGNIFICANT CHANGE UP (ref 3.8–5.2)
RBC # FLD: 13.1 % — SIGNIFICANT CHANGE UP (ref 10.3–14.5)
RBC CASTS # UR COMP ASSIST: SIGNIFICANT CHANGE UP /HPF (ref 0–4)
SARS-COV-2 RNA SPEC QL NAA+PROBE: SIGNIFICANT CHANGE UP
SODIUM SERPL-SCNC: 142 MMOL/L — SIGNIFICANT CHANGE UP (ref 135–145)
SP GR SPEC: 1.02 — SIGNIFICANT CHANGE UP (ref 1–1.05)
SPECIMEN SOURCE: SIGNIFICANT CHANGE UP
UROBILINOGEN FLD QL: SIGNIFICANT CHANGE UP
WBC # BLD: 9.37 K/UL — SIGNIFICANT CHANGE UP (ref 3.8–10.5)
WBC # FLD AUTO: 9.37 K/UL — SIGNIFICANT CHANGE UP (ref 3.8–10.5)
WBC UR QL: >50 /HPF — SIGNIFICANT CHANGE UP (ref 0–5)

## 2022-01-05 PROCEDURE — 99233 SBSQ HOSP IP/OBS HIGH 50: CPT

## 2022-01-05 RX ORDER — POTASSIUM CHLORIDE 20 MEQ
10 PACKET (EA) ORAL
Refills: 0 | Status: COMPLETED | OUTPATIENT
Start: 2022-01-05 | End: 2022-01-05

## 2022-01-05 RX ORDER — CEFTRIAXONE 500 MG/1
1000 INJECTION, POWDER, FOR SOLUTION INTRAMUSCULAR; INTRAVENOUS EVERY 24 HOURS
Refills: 0 | Status: COMPLETED | OUTPATIENT
Start: 2022-01-05 | End: 2022-01-07

## 2022-01-05 RX ORDER — POTASSIUM CHLORIDE 20 MEQ
40 PACKET (EA) ORAL ONCE
Refills: 0 | Status: DISCONTINUED | OUTPATIENT
Start: 2022-01-05 | End: 2022-01-05

## 2022-01-05 RX ORDER — SODIUM CHLORIDE 9 MG/ML
1000 INJECTION, SOLUTION INTRAVENOUS
Refills: 0 | Status: DISCONTINUED | OUTPATIENT
Start: 2022-01-05 | End: 2022-01-06

## 2022-01-05 RX ADMIN — SODIUM CHLORIDE 50 MILLILITER(S): 9 INJECTION, SOLUTION INTRAVENOUS at 17:26

## 2022-01-05 RX ADMIN — CEFTRIAXONE 100 MILLIGRAM(S): 500 INJECTION, POWDER, FOR SOLUTION INTRAMUSCULAR; INTRAVENOUS at 17:27

## 2022-01-05 RX ADMIN — Medication 0.25 MILLIGRAM(S): at 10:28

## 2022-01-05 RX ADMIN — Medication 100 MILLIEQUIVALENT(S): at 10:28

## 2022-01-05 RX ADMIN — Medication 0.25 MILLIGRAM(S): at 17:27

## 2022-01-05 RX ADMIN — Medication 100 MILLIEQUIVALENT(S): at 12:45

## 2022-01-05 RX ADMIN — Medication 650 MILLIGRAM(S): at 05:53

## 2022-01-05 RX ADMIN — ENOXAPARIN SODIUM 40 MILLIGRAM(S): 100 INJECTION SUBCUTANEOUS at 11:39

## 2022-01-05 RX ADMIN — ATORVASTATIN CALCIUM 20 MILLIGRAM(S): 80 TABLET, FILM COATED ORAL at 22:17

## 2022-01-05 RX ADMIN — Medication 650 MILLIGRAM(S): at 07:17

## 2022-01-05 RX ADMIN — SODIUM CHLORIDE 50 MILLILITER(S): 9 INJECTION, SOLUTION INTRAVENOUS at 22:18

## 2022-01-05 RX ADMIN — Medication 50 MICROGRAM(S): at 05:53

## 2022-01-05 RX ADMIN — MIRTAZAPINE 15 MILLIGRAM(S): 45 TABLET, ORALLY DISINTEGRATING ORAL at 22:15

## 2022-01-05 RX ADMIN — Medication 100 MILLIEQUIVALENT(S): at 11:39

## 2022-01-05 RX ADMIN — HALOPERIDOL DECANOATE 0.5 MILLIGRAM(S): 100 INJECTION INTRAMUSCULAR at 02:22

## 2022-01-05 NOTE — PROGRESS NOTE ADULT - PROBLEM SELECTOR PLAN 1
Concern for worsening dementia with behavioral disturbances based on collateral provided by pt's son.  Robert body Dementia ?  - CT head negative for acute pathology  - According to [employer] (Reference #: [id number]), pt has been regularly prescribed Xanax 0.5 mg qid PRN by her PCP and was last dispensed on 11/30/21. Per patient she has been taking 2 tabs in AM and 2 tabs in PM for years  - Frequent re-orientation  - Ensure proper sleep-wake cycle  - xanax tapered off as recommended by psych but had been on for years, concerned for any component of BZD withdrawal... - d/w psych c/w xanax PRN anxiety (will change to q6 PRN for tonight), added Haldol PRN for paranoia - psych will f/u in am --->>>> d/w psych will resume standing BZDs as concerned re BZD withdrawal - Xanax 0.25 bid, give Ativan 0.5 if he doesn't take the Xanax check Cdiff  GI PCR neg  IVFs

## 2022-01-05 NOTE — PROGRESS NOTE ADULT - PROBLEM SELECTOR PLAN 2
acute pain of left shoulder. x1 day  no swelling, but ROM is limited due to pain.   Xray neg for fx or dislocation.   unable to participate with PT due to pain at this time.   tylenol prn for pain. UA grossly +, f/u cx, CTX aim for 3 days

## 2022-01-05 NOTE — PROGRESS NOTE ADULT - PROBLEM SELECTOR PLAN 3
Pt with fall at home, likely mechanical in nature in setting of debility/worsening functional status 2/2 dementia  - CK elevated to 331, likely from fall  - Mental status - AAO to self and place. Agitated at times.   - CT head and C-spine negative for acute pathology, C-collar cleared in ED  - EKG showed ST depression in lateral leads I and aVL, QTc 420 ms, hST 28-->34, chest pain free  - normal orthostatics  - PT consult - patient refusing to participate.   - Fall precautions, seizure precautions, bed alarm Concern for worsening dementia with behavioral disturbances based on collateral provided by pt's son.  Robert body Dementia ?  - CT head negative for acute pathology  - According to [employer] (Reference #: [id number]), pt has been regularly prescribed Xanax 0.5 mg qid PRN by her PCP and was last dispensed on 11/30/21. Per patient she has been taking 2 tabs in AM and 2 tabs in PM for years  - Frequent re-orientation  - Ensure proper sleep-wake cycle  - xanax tapered off as recommended by psych but had been on for years, concerned for any component of BZD withdrawal... - d/w psych c/w xanax PRN anxiety , added Haldol PRN for paranoia - psych will f/u in am --->>>> d/w psych resumed standing BZDs as concerned re BZD withdrawal - Xanax 0.25 bid, give Ativan 0.5 if he doesn't take the Xanax

## 2022-01-05 NOTE — PROGRESS NOTE ADULT - PROBLEM SELECTOR PLAN 10
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  - Not on blood thinners per son, no ASA or Plavix  - Monitor for changes in upper extremities Pt has chronic constipation.  - Monitor for BM with stool count  --->>>now with diarrhea....

## 2022-01-05 NOTE — ED ADULT TRIAGE NOTE - TEMPERATURE IN FAHRENHEIT (DEGREES F)
Physical Therapy Progress Note - [facility]      Treatment Diagnosis: Left BKA, diabetic  Patient's Personal Goal: get therapy to work on ambulation with the prosthetic    Subjective: The patient has a prosthetic, but has not been wearing it because he never had therapy after to work on ambulation.      Pain: The patient is occasionally having pain in the stump.  Denies phantom pain.    Skin Condition:  Incision healed and dry.    Girth Measurements:   BKA Residual Limb   (in cm)        Date 2/11/21 3/4/21 5/2/21 9/1/21 1/5/22   5 cm above Patella  49.7 45.0 45.1 47.0 49.5   Tibial Plateau  41.0 42.2 41.0  41.1 40.3   5 cm distal to Tibial Plateau  39.5 39 38.1 39.4 39.8   10 cm distal to Tibial Plateau  40.7 37.7 39.0 40.4 39.5   Limb Length from Tibial Tuberosity  10 9.5 9.5 14.6* 14.5     *there is some fluid at the end of the stump, no enough to account for the difference.  The assumption is this therapist is measuring from a different spot than the prior therapist.   Range of Motion Left Knee Initial  2/11/21  Left  3/4/21 Left  5/2/21 Left  9/1 Left  1/5/22   Flexion 85 90 86 86 84   Extension 0 0 -15 -10 -12        Strength: MMT Left 1/5/22 Right 1/5/22   Hip Flexion 4- Hip Flexion 5   Hip Extension  Hip Extension    Hip Abduction  Hip Abduction    Hip Adduction  Hip Adduction    Knee Extension 4 Knee Extension 5   Knee Flexion 4+ Knee Flexion 5   Ankle Dorsiflexion  Ankle Dorsiflexion 5        Functional Mobility: Able to walk 40 ft in the room (not for distance), but bears weight minimally on the prosthesis and reports knee pain when attempting to bear weight.     Prosthesis: K2 prosthetic, hydraulic ankle, flexible keel foot.  He is not presently wearing it much since he hasn't been trained to walk on it yet.  The patient reported difficulty donning and doffing the prosthesis, but with cues and a few practice reps he was able and had greater confidence.    Recommendations: Follow up with [facility]  in 6 months.  Start OP PT ASAP.                   98

## 2022-01-05 NOTE — PROGRESS NOTE ADULT - ASSESSMENT
89 y/o F, PMH of dementia, HTN, HLD, Hypothyroidism, MDD, Constipation, Breast Cancer (s/p lymph node removal and radiation ~30 years ago), Takayasu Arteritis, admitted for fall and concern for worsening dementia vs ativan induced. now improved and off Ativan taper.   Very paranoid, has been tapered off benzos, last dose 12/30...   89 y/o F, PMH of dementia, HTN, HLD, Hypothyroidism, MDD, Constipation, Breast Cancer (s/p lymph node removal and radiation ~30 years ago), Takayasu Arteritis, admitted for fall and concern for worsening dementia vs ativan induced. now improved and off Ativan taper.   Very paranoid, has been tapered off benzos, last dose 12/30 - resumed BZD bec concerned about withdrawal  1/5/21 diarrhea, +UA - will rx for UTI

## 2022-01-05 NOTE — PROGRESS NOTE ADULT - PROBLEM SELECTOR PLAN 8
Pt has chronic constipation.  - Monitor for BM with stool count  - Bowel regimen with suppository/enemas PRN if pt strict NPO -c/w synthroid
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
## 2022-01-05 NOTE — PROGRESS NOTE ADULT - PROBLEM SELECTOR PLAN 5
-c/w statin Pt with fall at home, likely mechanical in nature in setting of debility/worsening functional status 2/2 dementia  - CK elevated to 331, likely from fall  - Mental status - AAO to self and place. Agitated at times.   - CT head and C-spine negative for acute pathology, C-collar cleared in ED  - EKG showed ST depression in lateral leads I and aVL, QTc 420 ms, hST 28-->34, chest pain free  - normal orthostatics  - PT consult - patient refusing to participate.   - Fall precautions, seizure precautions, bed alarm

## 2022-01-05 NOTE — PROGRESS NOTE ADULT - PROBLEM SELECTOR PLAN 4
BPs in acceptable range acute pain of left shoulder. x1 day  no swelling, but ROM is limited due to pain.   Xray neg for fx or dislocation.   unable to participate with PT due to pain at this time.   tylenol prn for pain.

## 2022-01-05 NOTE — PROGRESS NOTE ADULT - PROBLEM SELECTOR PLAN 11
- DVT ppx: Lovenox 40mg, SQ, daily    - GoC  - Code status reviewed w/ pt's son, Manuel, over telephone  - She does not have HCP or living will and is FULL CODE per son  - Son wants patient to be discharge to some kind of long term care facility. f/u with CM/SW. According to pt's son, pt s/p lymph node removal and radiation 30 years ago.

## 2022-01-06 LAB
ANION GAP SERPL CALC-SCNC: 11 MMOL/L — SIGNIFICANT CHANGE UP (ref 7–14)
BUN SERPL-MCNC: 20 MG/DL — SIGNIFICANT CHANGE UP (ref 7–23)
C DIFF BY PCR RESULT: SIGNIFICANT CHANGE UP
C DIFF TOX GENS STL QL NAA+PROBE: SIGNIFICANT CHANGE UP
CALCIUM SERPL-MCNC: 9 MG/DL — SIGNIFICANT CHANGE UP (ref 8.4–10.5)
CHLORIDE SERPL-SCNC: 105 MMOL/L — SIGNIFICANT CHANGE UP (ref 98–107)
CO2 SERPL-SCNC: 24 MMOL/L — SIGNIFICANT CHANGE UP (ref 22–31)
CREAT SERPL-MCNC: 0.68 MG/DL — SIGNIFICANT CHANGE UP (ref 0.5–1.3)
GLUCOSE SERPL-MCNC: 112 MG/DL — HIGH (ref 70–99)
HCT VFR BLD CALC: 35.4 % — SIGNIFICANT CHANGE UP (ref 34.5–45)
HGB BLD-MCNC: 11.7 G/DL — SIGNIFICANT CHANGE UP (ref 11.5–15.5)
MAGNESIUM SERPL-MCNC: 1.8 MG/DL — SIGNIFICANT CHANGE UP (ref 1.6–2.6)
MCHC RBC-ENTMCNC: 29.8 PG — SIGNIFICANT CHANGE UP (ref 27–34)
MCHC RBC-ENTMCNC: 33.1 GM/DL — SIGNIFICANT CHANGE UP (ref 32–36)
MCV RBC AUTO: 90.3 FL — SIGNIFICANT CHANGE UP (ref 80–100)
NRBC # BLD: 0 /100 WBCS — SIGNIFICANT CHANGE UP
NRBC # FLD: 0 K/UL — SIGNIFICANT CHANGE UP
PHOSPHATE SERPL-MCNC: 2.3 MG/DL — LOW (ref 2.5–4.5)
PLATELET # BLD AUTO: 357 K/UL — SIGNIFICANT CHANGE UP (ref 150–400)
POTASSIUM SERPL-MCNC: 3.8 MMOL/L — SIGNIFICANT CHANGE UP (ref 3.5–5.3)
POTASSIUM SERPL-SCNC: 3.8 MMOL/L — SIGNIFICANT CHANGE UP (ref 3.5–5.3)
RBC # BLD: 3.92 M/UL — SIGNIFICANT CHANGE UP (ref 3.8–5.2)
RBC # FLD: 13.3 % — SIGNIFICANT CHANGE UP (ref 10.3–14.5)
SODIUM SERPL-SCNC: 140 MMOL/L — SIGNIFICANT CHANGE UP (ref 135–145)
WBC # BLD: 11.33 K/UL — HIGH (ref 3.8–10.5)
WBC # FLD AUTO: 11.33 K/UL — HIGH (ref 3.8–10.5)

## 2022-01-06 PROCEDURE — 99232 SBSQ HOSP IP/OBS MODERATE 35: CPT

## 2022-01-06 PROCEDURE — 99497 ADVNCD CARE PLAN 30 MIN: CPT

## 2022-01-06 RX ORDER — ALPRAZOLAM 0.25 MG
0.25 TABLET ORAL
Refills: 0 | Status: DISCONTINUED | OUTPATIENT
Start: 2022-01-06 | End: 2022-01-13

## 2022-01-06 RX ORDER — SODIUM,POTASSIUM PHOSPHATES 278-250MG
1 POWDER IN PACKET (EA) ORAL ONCE
Refills: 0 | Status: COMPLETED | OUTPATIENT
Start: 2022-01-06 | End: 2022-01-06

## 2022-01-06 RX ORDER — POLYETHYLENE GLYCOL 3350 17 G/17G
17 POWDER, FOR SOLUTION ORAL DAILY
Refills: 0 | Status: DISCONTINUED | OUTPATIENT
Start: 2022-01-07 | End: 2022-01-12

## 2022-01-06 RX ADMIN — Medication 650 MILLIGRAM(S): at 06:06

## 2022-01-06 RX ADMIN — CEFTRIAXONE 100 MILLIGRAM(S): 500 INJECTION, POWDER, FOR SOLUTION INTRAMUSCULAR; INTRAVENOUS at 17:28

## 2022-01-06 RX ADMIN — Medication 1 PACKET(S): at 13:20

## 2022-01-06 RX ADMIN — ENOXAPARIN SODIUM 40 MILLIGRAM(S): 100 INJECTION SUBCUTANEOUS at 13:01

## 2022-01-06 RX ADMIN — Medication 0.25 MILLIGRAM(S): at 13:21

## 2022-01-06 RX ADMIN — MIRTAZAPINE 15 MILLIGRAM(S): 45 TABLET, ORALLY DISINTEGRATING ORAL at 21:41

## 2022-01-06 RX ADMIN — SERTRALINE 100 MILLIGRAM(S): 25 TABLET, FILM COATED ORAL at 13:01

## 2022-01-06 RX ADMIN — ATORVASTATIN CALCIUM 20 MILLIGRAM(S): 80 TABLET, FILM COATED ORAL at 21:41

## 2022-01-06 RX ADMIN — Medication 50 MICROGRAM(S): at 06:06

## 2022-01-06 RX ADMIN — Medication 0.25 MILLIGRAM(S): at 17:30

## 2022-01-06 NOTE — PROGRESS NOTE ADULT - PROBLEM SELECTOR PLAN 10
Pt has chronic constipation.  - Monitor for BM with stool count  --->>>now with diarrhea.... Pt has chronic constipation.  - Monitor for BM with stool count  diarrhea resolved....
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
## 2022-01-06 NOTE — PROGRESS NOTE ADULT - SUBJECTIVE AND OBJECTIVE BOX
[facility] Division of Hospital Medicine  Gloria Chisholm MD  Pager (M-F, 8A-5P):  In-house pager [phone]; Long-range pager [phone]  Other Times:  Please page Hospitalist in Charge -  In-house pager [phone]    Patient is a 89y old  Female who presents with a chief complaint of Fall, dementia (2022 08:39)    SUBJECTIVE / OVERNIGHT EVENTS:  ...  ADDITIONAL REVIEW OF SYSTEMS:    MEDICATIONS  (STANDING):  ALPRAZolam 0.25 milliGRAM(s) Oral <User Schedule>  atorvastatin 20 milliGRAM(s) Oral at bedtime  cefTRIAXone   IVPB 1000 milliGRAM(s) IV Intermittent every 24 hours  enoxaparin Injectable 40 milliGRAM(s) SubCutaneous daily  lactated ringers. 1000 milliLiter(s) (50 mL/Hr) IV Continuous <Continuous>  levothyroxine 50 MICROGram(s) Oral daily  mirtazapine 15 milliGRAM(s) Oral at bedtime  sertraline 100 milliGRAM(s) Oral daily    MEDICATIONS  (PRN):  acetaminophen     Tablet .. 650 milliGRAM(s) Oral every 6 hours PRN Temp greater or equal to 38C (100.4F), Moderate Pain (4 - 6)  artificial  tears Solution 1 Drop(s) Both EYES daily PRN Dry Eyes  haloperidol    Injectable 0.5 milliGRAM(s) IntraMuscular every 6 hours PRN paranoia  LORazepam   Injectable 0.5 milliGRAM(s) IV Push <User Schedule> PRN Not taking standing PO xanax  LORazepam   Injectable 0.5 milliGRAM(s) IntraMuscular <User Schedule> PRN Not taking standing PO Xanax    PHYSICAL EXAM:  Vital Signs Last 24 Hrs  T(C): 37.2 (2022 06:05), Max: 37.2 (2022 06:05)  T(F): 99 (2022 06:05), Max: 99 (2022 06:05)  HR: 73 (2022 06:05) (73 - 83)  BP: 113/51 (2022 06:05) (111/56 - 143/70)  BP(mean): --  RR: 18 (2022 06:05) (18 - 18)  SpO2: 95% (2022 06:05) (95% - 96%)    Gen: NAD; resting in bed. awake and alert.   Pulm: no respiratory distress; no accessory muscle use; CTA b/l; no wheezing; no crackles.   Cards: RRR, nl S1/S2; no obvious murmurs; no LE edema; no JVD  Abd: soft; NT on exam; +bs  Ext: no cyanosis; no joint effusion; no joint pain on palpation.  Skin: no rash; no cyanosis  PSYCH: paranoid, loud, anxious    LABS:                        11.7   11.33 )-----------( 357      ( 2022 07:19 )             35.4     01-06    140  |  105  |  20  ----------------------------<  112<H>  3.8   |  24  |  0.68    Ca    9.0      2022 07:19  Phos  2.3     01-06  Mg     1.80     01-06    Urinalysis Basic - ( 2022 02:57 )  Color: Light Orange / Appearance: Turbid / S.025 / pH: x  Gluc: x / Ketone: Trace  / Bili: Negative / Urobili: <2 mg/dL   Blood: x / Protein: 100 mg/dL / Nitrite: Positive   Leuk Esterase: Large / RBC: 5-10 /HPF / WBC >50 /HPF   Sq Epi: x / Non Sq Epi: 3 /HPF / Bacteria: Many    GI PCR Panel, Stool (collected 2022 11:57)  Source: .Stool Feces  Final Report (2022 17:21):    GI PCR Results: NOT detected    *******Please Note:*******    GI panel PCR evaluates for:    Campylobacter, Plesiomonas shigelloides, Salmonella,    Vibrio, Yersinia enterocolitica, Enteroaggregative    Escherichia coli (EAEC), Enteropathogenic E.coli (EPEC),    Enterotoxigenic E. coli (ETEC) lt/st, Shiga-like    toxin-producing E. coli (STEC) stx1/stx2,    Shigella/ Enteroinvasive E. coli (EIEC), Cryptosporidium,    Cyclospora cayetanensis, Entamoeba histolytica,    Giardia lamblia, Adenovirus F 40/41, Astrovirus,    Norovirus GI/GII, Rotavirus A, Sapovirus    RADIOLOGY & ADDITIONAL TESTS:  Results Reviewed:   Imaging Personally Reviewed:  Electrocardiogram Personally Reviewed:    COORDINATION OF CARE:  Care Discussed with Consultants/Other Providers [Y/N]: RN, SW, CM, ACP, psych re overall care   Prior or Outpatient Records Reviewed [Y/N]:   [facility] Division of Hospital Medicine  Gloria Chisholm MD  Pager (M-F, 8A-5P):  In-house pager [phone]; Long-range pager [phone]  Other Times:  Please page Hospitalist in Charge -  In-house pager [phone]    Patient is a 89y old  Female who presents with a chief complaint of Fall, dementia (2022 08:39)    SUBJECTIVE / OVERNIGHT EVENTS:    ADDITIONAL REVIEW OF SYSTEMS:    MEDICATIONS  (STANDING):  ALPRAZolam 0.25 milliGRAM(s) Oral <User Schedule>  atorvastatin 20 milliGRAM(s) Oral at bedtime  cefTRIAXone   IVPB 1000 milliGRAM(s) IV Intermittent every 24 hours  enoxaparin Injectable 40 milliGRAM(s) SubCutaneous daily  lactated ringers. 1000 milliLiter(s) (50 mL/Hr) IV Continuous <Continuous>  levothyroxine 50 MICROGram(s) Oral daily  mirtazapine 15 milliGRAM(s) Oral at bedtime  sertraline 100 milliGRAM(s) Oral daily    MEDICATIONS  (PRN):  acetaminophen     Tablet .. 650 milliGRAM(s) Oral every 6 hours PRN Temp greater or equal to 38C (100.4F), Moderate Pain (4 - 6)  artificial  tears Solution 1 Drop(s) Both EYES daily PRN Dry Eyes  haloperidol    Injectable 0.5 milliGRAM(s) IntraMuscular every 6 hours PRN paranoia  LORazepam   Injectable 0.5 milliGRAM(s) IV Push <User Schedule> PRN Not taking standing PO xanax  LORazepam   Injectable 0.5 milliGRAM(s) IntraMuscular <User Schedule> PRN Not taking standing PO Xanax    PHYSICAL EXAM:  Vital Signs Last 24 Hrs  T(C): 37.2 (2022 06:05), Max: 37.2 (2022 06:05)  T(F): 99 (2022 06:05), Max: 99 (2022 06:05)  HR: 73 (2022 06:05) (73 - 83)  BP: 113/51 (2022 06:05) (111/56 - 143/70)  BP(mean): --  RR: 18 (2022 06:05) (18 - 18)  SpO2: 95% (2022 06:05) (95% - 96%)    Gen: NAD; resting in bed. awake and alert.   Pulm: no respiratory distress; no accessory muscle use; CTA b/l; no wheezing; no crackles.   Cards: RRR, nl S1/S2; no obvious murmurs; no LE edema; no JVD  Abd: soft; NT on exam; +bs  Ext: no cyanosis; no joint effusion; no joint pain on palpation.  Skin: no rash; no cyanosis  PSYCH: paranoid, loud, anxious    LABS:                        11.7   11.33 )-----------( 357      ( 2022 07:19 )             35.4     01-06    140  |  105  |  20  ----------------------------<  112<H>  3.8   |  24  |  0.68    Ca    9.0      2022 07:19  Phos  2.3     01-06  Mg     1.80     01-06    Urinalysis Basic - ( 2022 02:57 )  Color: Light Orange / Appearance: Turbid / S.025 / pH: x  Gluc: x / Ketone: Trace  / Bili: Negative / Urobili: <2 mg/dL   Blood: x / Protein: 100 mg/dL / Nitrite: Positive   Leuk Esterase: Large / RBC: 5-10 /HPF / WBC >50 /HPF   Sq Epi: x / Non Sq Epi: 3 /HPF / Bacteria: Many    GI PCR Panel, Stool (collected 2022 11:57)  Source: .Stool Feces  Final Report (2022 17:21):    GI PCR Results: NOT detected    *******Please Note:*******    GI panel PCR evaluates for:    Campylobacter, Plesiomonas shigelloides, Salmonella,    Vibrio, Yersinia enterocolitica, Enteroaggregative    Escherichia coli (EAEC), Enteropathogenic E.coli (EPEC),    Enterotoxigenic E. coli (ETEC) lt/st, Shiga-like    toxin-producing E. coli (STEC) stx1/stx2,    Shigella/ Enteroinvasive E. coli (EIEC), Cryptosporidium,    Cyclospora cayetanensis, Entamoeba histolytica,    Giardia lamblia, Adenovirus F 40/41, Astrovirus,    Norovirus GI/GII, Rotavirus A, Sapovirus    RADIOLOGY & ADDITIONAL TESTS:  Results Reviewed:   Imaging Personally Reviewed:  Electrocardiogram Personally Reviewed:    COORDINATION OF CARE:  Care Discussed with Consultants/Other Providers [Y/N]: RN, SW, CM, ACP, psych re overall care   Prior or Outpatient Records Reviewed [Y/N]:   [facility] Division of Hospital Medicine  Gloria Chisholm MD  Pager (M-F, 8A-5P):  In-house pager [phone]; Long-range pager [phone]  Other Times:  Please page Hospitalist in Charge -  In-house pager [phone]    Patient is a 89y old  Female who presents with a chief complaint of Fall, dementia (2022 08:39)    SUBJECTIVE / OVERNIGHT EVENTS:  Seen earlier, resting in bed.  C/o R shoulder pain, hard to lift arm.  No further diarrhea per RN.    ADDITIONAL REVIEW OF SYSTEMS:    MEDICATIONS  (STANDING):  ALPRAZolam 0.25 milliGRAM(s) Oral <User Schedule>  atorvastatin 20 milliGRAM(s) Oral at bedtime  cefTRIAXone   IVPB 1000 milliGRAM(s) IV Intermittent every 24 hours  enoxaparin Injectable 40 milliGRAM(s) SubCutaneous daily  lactated ringers. 1000 milliLiter(s) (50 mL/Hr) IV Continuous <Continuous>  levothyroxine 50 MICROGram(s) Oral daily  mirtazapine 15 milliGRAM(s) Oral at bedtime  sertraline 100 milliGRAM(s) Oral daily    MEDICATIONS  (PRN):  acetaminophen     Tablet .. 650 milliGRAM(s) Oral every 6 hours PRN Temp greater or equal to 38C (100.4F), Moderate Pain (4 - 6)  artificial  tears Solution 1 Drop(s) Both EYES daily PRN Dry Eyes  haloperidol    Injectable 0.5 milliGRAM(s) IntraMuscular every 6 hours PRN paranoia  LORazepam   Injectable 0.5 milliGRAM(s) IV Push <User Schedule> PRN Not taking standing PO xanax  LORazepam   Injectable 0.5 milliGRAM(s) IntraMuscular <User Schedule> PRN Not taking standing PO Xanax    PHYSICAL EXAM:  Vital Signs Last 24 Hrs  T(C): 37.2 (2022 06:05), Max: 37.2 (2022 06:05)  T(F): 99 (2022 06:05), Max: 99 (2022 06:05)  HR: 73 (2022 06:05) (73 - 83)  BP: 113/51 (2022 06:05) (111/56 - 143/70)  BP(mean): --  RR: 18 (2022 06:05) (18 - 18)  SpO2: 95% (2022 06:05) (95% - 96%)    Gen: NAD; resting in bed. awake and alert.   Pulm: no respiratory distress; no accessory muscle use; CTA b/l; no wheezing; no crackles.   Cards: RRR, nl S1/S2; no obvious murmurs; no LE edema; no JVD  Abd: soft; NT on exam; +bs  Ext: no cyanosis; no joint effusion; no joint pain on palpation; unable to lift R arm d/t pain  Skin: no rash; no cyanosis  PSYCH: paranoid, loud, anxious    LABS:                        11.7   11.33 )-----------( 357      ( 2022 07:19 )             35.4     01-06    140  |  105  |  20  ----------------------------<  112<H>  3.8   |  24  |  0.68    Ca    9.0      2022 07:19  Phos  2.3     01-06  Mg     1.80     01-06    Urinalysis Basic - ( 2022 02:57 )  Color: Light Orange / Appearance: Turbid / S.025 / pH: x  Gluc: x / Ketone: Trace  / Bili: Negative / Urobili: <2 mg/dL   Blood: x / Protein: 100 mg/dL / Nitrite: Positive   Leuk Esterase: Large / RBC: 5-10 /HPF / WBC >50 /HPF   Sq Epi: x / Non Sq Epi: 3 /HPF / Bacteria: Many    GI PCR Panel, Stool (collected 2022 11:57)  Source: .Stool Feces  Final Report (2022 17:21):    GI PCR Results: NOT detected    *******Please Note:*******    GI panel PCR evaluates for:    Campylobacter, Plesiomonas shigelloides, Salmonella,    Vibrio, Yersinia enterocolitica, Enteroaggregative    Escherichia coli (EAEC), Enteropathogenic E.coli (EPEC),    Enterotoxigenic E. coli (ETEC) lt/st, Shiga-like    toxin-producing E. coli (STEC) stx1/stx2,    Shigella/ Enteroinvasive E. coli (EIEC), Cryptosporidium,    Cyclospora cayetanensis, Entamoeba histolytica,    Giardia lamblia, Adenovirus F 40/41, Astrovirus,    Norovirus GI/GII, Rotavirus A, Sapovirus    RADIOLOGY & ADDITIONAL TESTS:  Results Reviewed:   Imaging Personally Reviewed:  Electrocardiogram Personally Reviewed:    COORDINATION OF CARE:  Care Discussed with Consultants/Other Providers [Y/N]: RN, SW, CM, ACP, psych re overall care   Prior or Outpatient Records Reviewed [Y/N]:

## 2022-01-06 NOTE — PROGRESS NOTE ADULT - PROBLEM SELECTOR PLAN 4
acute pain of left shoulder. x1 day  no swelling, but ROM is limited due to pain.   Xray neg for fx or dislocation.   unable to participate with PT due to pain at this time.   tylenol prn for pain. acute pain of left shoulder. x1 day  no swelling, but ROM is limited due to pain.   Xray neg for fx or dislocation.   unable to participate with PT due to pain at this time.   tylenol prn for pain.  ---->>>repeat XR....

## 2022-01-06 NOTE — PROGRESS NOTE ADULT - ASSESSMENT
89 y/o F, PMH of dementia, HTN, HLD, Hypothyroidism, MDD, Constipation, Breast Cancer (s/p lymph node removal and radiation ~30 years ago), Takayasu Arteritis, admitted for fall and concern for worsening dementia vs ativan induced. now improved and off Ativan taper.   Very paranoid, has been tapered off benzos, last dose 12/30 - resumed BZD bec concerned about withdrawal  1/5/21 diarrhea, +UA - will rx for UTI   89 y/o F, PMH of dementia, HTN, HLD, Hypothyroidism, MDD, Constipation, Breast Cancer (s/p lymph node removal and radiation ~30 years ago), Takayasu Arteritis, admitted for fall and concern for worsening dementia vs ativan induced. now improved and off Ativan taper.   Very paranoid, has been tapered off benzos, last dose 12/30 - resumed BZD bec concerned about withdrawal  1/5/21 diarrhea - has resolved   +UA - will rx for UTI

## 2022-01-06 NOTE — PROGRESS NOTE ADULT - CONVERSATION DETAILS
d/w Dr. Sutton re overall care.  He feels she does not have any significant medical problems at this time, mostly psychiatric.  Feels pt would want to be full code.

## 2022-01-06 NOTE — PROGRESS NOTE ADULT - PROBLEM SELECTOR PLAN 3
Concern for worsening dementia with behavioral disturbances based on collateral provided by pt's son.  Robert body Dementia ?  - CT head negative for acute pathology  - According to ISTOP (Reference #: [id number]), pt has been regularly prescribed Xanax 0.5 mg qid PRN by her PCP and was last dispensed on 11/30/21. Per patient she has been taking 2 tabs in AM and 2 tabs in PM for years  - Frequent re-orientation  - Ensure proper sleep-wake cycle  - xanax tapered off as recommended by psych but had been on for years, concerned for any component of BZD withdrawal... - d/w psych c/w xanax PRN anxiety , added Haldol PRN for paranoia - psych will f/u in am --->>>> d/w psych resumed standing BZDs as concerned re BZD withdrawal - Xanax 0.25 bid, give Ativan 0.5 if he doesn't take the Xanax

## 2022-01-07 LAB
-  AMIKACIN: SIGNIFICANT CHANGE UP
-  AMOXICILLIN/CLAVULANIC ACID: SIGNIFICANT CHANGE UP
-  AMPICILLIN/SULBACTAM: SIGNIFICANT CHANGE UP
-  AMPICILLIN: SIGNIFICANT CHANGE UP
-  AZTREONAM: SIGNIFICANT CHANGE UP
-  CEFAZOLIN: SIGNIFICANT CHANGE UP
-  CEFEPIME: SIGNIFICANT CHANGE UP
-  CEFOXITIN: SIGNIFICANT CHANGE UP
-  CEFTRIAXONE: SIGNIFICANT CHANGE UP
-  CIPROFLOXACIN: SIGNIFICANT CHANGE UP
-  ERTAPENEM: SIGNIFICANT CHANGE UP
-  GENTAMICIN: SIGNIFICANT CHANGE UP
-  LEVOFLOXACIN: SIGNIFICANT CHANGE UP
-  MEROPENEM: SIGNIFICANT CHANGE UP
-  NITROFURANTOIN: SIGNIFICANT CHANGE UP
-  PIPERACILLIN/TAZOBACTAM: SIGNIFICANT CHANGE UP
-  TOBRAMYCIN: SIGNIFICANT CHANGE UP
-  TRIMETHOPRIM/SULFAMETHOXAZOLE: SIGNIFICANT CHANGE UP
ANION GAP SERPL CALC-SCNC: 10 MMOL/L — SIGNIFICANT CHANGE UP (ref 7–14)
BUN SERPL-MCNC: 20 MG/DL — SIGNIFICANT CHANGE UP (ref 7–23)
CALCIUM SERPL-MCNC: 9 MG/DL — SIGNIFICANT CHANGE UP (ref 8.4–10.5)
CHLORIDE SERPL-SCNC: 102 MMOL/L — SIGNIFICANT CHANGE UP (ref 98–107)
CO2 SERPL-SCNC: 26 MMOL/L — SIGNIFICANT CHANGE UP (ref 22–31)
CREAT SERPL-MCNC: 0.65 MG/DL — SIGNIFICANT CHANGE UP (ref 0.5–1.3)
CULTURE RESULTS: SIGNIFICANT CHANGE UP
GLUCOSE SERPL-MCNC: 106 MG/DL — HIGH (ref 70–99)
HCT VFR BLD CALC: 37 % — SIGNIFICANT CHANGE UP (ref 34.5–45)
HGB BLD-MCNC: 11.7 G/DL — SIGNIFICANT CHANGE UP (ref 11.5–15.5)
MAGNESIUM SERPL-MCNC: 1.8 MG/DL — SIGNIFICANT CHANGE UP (ref 1.6–2.6)
MCHC RBC-ENTMCNC: 29.1 PG — SIGNIFICANT CHANGE UP (ref 27–34)
MCHC RBC-ENTMCNC: 31.6 GM/DL — LOW (ref 32–36)
MCV RBC AUTO: 92 FL — SIGNIFICANT CHANGE UP (ref 80–100)
METHOD TYPE: SIGNIFICANT CHANGE UP
NRBC # BLD: 0 /100 WBCS — SIGNIFICANT CHANGE UP
NRBC # FLD: 0 K/UL — SIGNIFICANT CHANGE UP
ORGANISM # SPEC MICROSCOPIC CNT: SIGNIFICANT CHANGE UP
ORGANISM # SPEC MICROSCOPIC CNT: SIGNIFICANT CHANGE UP
PHOSPHATE SERPL-MCNC: 2.7 MG/DL — SIGNIFICANT CHANGE UP (ref 2.5–4.5)
PLATELET # BLD AUTO: 342 K/UL — SIGNIFICANT CHANGE UP (ref 150–400)
POTASSIUM SERPL-MCNC: 3.9 MMOL/L — SIGNIFICANT CHANGE UP (ref 3.5–5.3)
POTASSIUM SERPL-SCNC: 3.9 MMOL/L — SIGNIFICANT CHANGE UP (ref 3.5–5.3)
RBC # BLD: 4.02 M/UL — SIGNIFICANT CHANGE UP (ref 3.8–5.2)
RBC # FLD: 13.3 % — SIGNIFICANT CHANGE UP (ref 10.3–14.5)
SODIUM SERPL-SCNC: 138 MMOL/L — SIGNIFICANT CHANGE UP (ref 135–145)
SPECIMEN SOURCE: SIGNIFICANT CHANGE UP
WBC # BLD: 9.71 K/UL — SIGNIFICANT CHANGE UP (ref 3.8–10.5)
WBC # FLD AUTO: 9.71 K/UL — SIGNIFICANT CHANGE UP (ref 3.8–10.5)

## 2022-01-07 PROCEDURE — 99232 SBSQ HOSP IP/OBS MODERATE 35: CPT

## 2022-01-07 RX ORDER — THIAMINE MONONITRATE (VIT B1) 100 MG
500 TABLET ORAL DAILY
Refills: 0 | Status: DISCONTINUED | OUTPATIENT
Start: 2022-01-07 | End: 2022-01-10

## 2022-01-07 RX ORDER — LACTOBACILLUS ACIDOPHILUS 100MM CELL
1 CAPSULE ORAL THREE TIMES A DAY
Refills: 0 | Status: DISCONTINUED | OUTPATIENT
Start: 2022-01-07 | End: 2022-01-17

## 2022-01-07 RX ADMIN — ATORVASTATIN CALCIUM 20 MILLIGRAM(S): 80 TABLET, FILM COATED ORAL at 22:12

## 2022-01-07 RX ADMIN — MIRTAZAPINE 15 MILLIGRAM(S): 45 TABLET, ORALLY DISINTEGRATING ORAL at 22:12

## 2022-01-07 RX ADMIN — Medication 105 MILLIGRAM(S): at 16:58

## 2022-01-07 RX ADMIN — Medication 1 TABLET(S): at 22:15

## 2022-01-07 RX ADMIN — Medication 105 MILLIGRAM(S): at 15:31

## 2022-01-07 RX ADMIN — Medication 0.25 MILLIGRAM(S): at 17:42

## 2022-01-07 RX ADMIN — ENOXAPARIN SODIUM 40 MILLIGRAM(S): 100 INJECTION SUBCUTANEOUS at 11:09

## 2022-01-07 RX ADMIN — Medication 1 TABLET(S): at 16:58

## 2022-01-07 RX ADMIN — Medication 50 MICROGRAM(S): at 05:42

## 2022-01-07 RX ADMIN — SERTRALINE 100 MILLIGRAM(S): 25 TABLET, FILM COATED ORAL at 11:13

## 2022-01-07 RX ADMIN — POLYETHYLENE GLYCOL 3350 17 GRAM(S): 17 POWDER, FOR SOLUTION ORAL at 11:12

## 2022-01-07 RX ADMIN — CEFTRIAXONE 100 MILLIGRAM(S): 500 INJECTION, POWDER, FOR SOLUTION INTRAMUSCULAR; INTRAVENOUS at 16:58

## 2022-01-07 RX ADMIN — Medication 0.25 MILLIGRAM(S): at 08:21

## 2022-01-07 NOTE — PROGRESS NOTE ADULT - SUBJECTIVE AND OBJECTIVE BOX
Dr. Mahsa Zuñiga  Pager [phone]    PROGRESS NOTE:     Patient is a 89y old  Female who presents with a chief complaint of Fall, dementia (06 Jan 2022 08:15)      SUBJECTIVE / OVERNIGHT EVENTS: pt denies chest pain or sob   ADDITIONAL REVIEW OF SYSTEMS: afebrile , demented and confused, thinks she's in her apartment, c/o right shoulder pain, refusing repeat xray     MEDICATIONS  (STANDING):  ALPRAZolam 0.25 milliGRAM(s) Oral <User Schedule>  atorvastatin 20 milliGRAM(s) Oral at bedtime  cefTRIAXone   IVPB 1000 milliGRAM(s) IV Intermittent every 24 hours  enoxaparin Injectable 40 milliGRAM(s) SubCutaneous daily  levothyroxine 50 MICROGram(s) Oral daily  mirtazapine 15 milliGRAM(s) Oral at bedtime  polyethylene glycol 3350 17 Gram(s) Oral daily  sertraline 100 milliGRAM(s) Oral daily  thiamine IVPB 500 milliGRAM(s) IV Intermittent daily    MEDICATIONS  (PRN):  acetaminophen     Tablet .. 650 milliGRAM(s) Oral every 6 hours PRN Temp greater or equal to 38C (100.4F), Moderate Pain (4 - 6)  artificial  tears Solution 1 Drop(s) Both EYES daily PRN Dry Eyes  haloperidol    Injectable 0.5 milliGRAM(s) IntraMuscular every 6 hours PRN paranoia  LORazepam   Injectable 0.5 milliGRAM(s) IV Push <User Schedule> PRN Not taking standing PO xanax  LORazepam   Injectable 0.5 milliGRAM(s) IntraMuscular <User Schedule> PRN Not taking standing PO Xanax      CAPILLARY BLOOD GLUCOSE        I&O's Summary      PHYSICAL EXAM:  Vital Signs Last 24 Hrs  T(C): 36.9 (07 Jan 2022 05:36), Max: 37.6 (06 Jan 2022 21:34)  T(F): 98.4 (07 Jan 2022 05:36), Max: 99.6 (06 Jan 2022 21:34)  HR: 65 (07 Jan 2022 05:36) (65 - 70)  BP: 130/45 (07 Jan 2022 05:36) (120/52 - 130/45)  BP(mean): --  RR: 18 (07 Jan 2022 05:36) (18 - 18)  SpO2: 96% (07 Jan 2022 08:44) (92% - 96%)    Gen: NAD; resting in bed. awake and alert.   Pulm: no respiratory distress; no accessory muscle use; CTA b/l; no wheezing; no crackles.   Cards: RRR, nl S1/S2; no obvious murmurs; no LE edema; no JVD  Abd: soft; NT on exam; +bs  Ext: no cyanosis; no joint effusion; no joint pain on palpation.  Skin: no rash; no cyanosis  PSYCH: paranoid, loud, anxious, a/o x1    LABS:                        11.7   9.71  )-----------( 342      ( 07 Jan 2022 07:15 )             37.0     01-07    138  |  102  |  20  ----------------------------<  106<H>  3.9   |  26  |  0.65    Ca    9.0      07 Jan 2022 07:15  Phos  2.7     01-07  Mg     1.80     01-07      Culture - Urine (collected 05 Jan 2022 14:02)  Source: Clean Catch Clean Catch (Midstream)  Preliminary Report (06 Jan 2022 19:46):    >100,000 CFU/ml Proteus mirabilis    GI PCR Panel, Stool (collected 05 Jan 2022 11:57)  Source: .Stool Feces  Final Report (05 Jan 2022 17:21):    GI PCR Results: NOT detected    *******Please Note:*******    GI panel PCR evaluates for:    Campylobacter, Plesiomonas shigelloides, Salmonella,    Vibrio, Yersinia enterocolitica, Enteroaggregative    Escherichia coli (EAEC), Enteropathogenic E.coli (EPEC),    Enterotoxigenic E. coli (ETEC) lt/st, Shiga-like    toxin-producing E. coli (STEC) stx1/stx2,    Shigella/ Enteroinvasive E. coli (EIEC), Cryptosporidium,    Cyclospora cayetanensis, Entamoeba histolytica,    Giardia lamblia, Adenovirus F 40/41, Astrovirus,    Norovirus GI/GII, Rotavirus A, Sapovirus        RADIOLOGY & ADDITIONAL TESTS:  Results Reviewed:   Imaging Personally Reviewed:  Electrocardiogram Personally Reviewed:    COORDINATION OF CARE:  Care Discussed with Consultants/Other Providers [Y/N]: acp [place], CT right shoulder, c/w CTX for UTI  Prior or Outpatient Records Reviewed [Y/N]:

## 2022-01-07 NOTE — PROGRESS NOTE ADULT - PROBLEM SELECTOR PLAN 4
acute pain of right shoulder, last xray from   no swelling, but ROM is limited due to pain.   Xray neg for fx or dislocation, pt refused repeat xray Advanced AC and glenohumeral joint osteoarthrosis with joint surface remodeling changes. Thick superiorly projecting acromial articular margin   osteophyte.will order CT right shoulder   unable to participate with PT due to pain at this time.   tylenol prn for pain.

## 2022-01-07 NOTE — PROGRESS NOTE ADULT - ASSESSMENT
89 y/o F, PMH of dementia, HTN, HLD, Hypothyroidism, MDD, Constipation, Breast Cancer (s/p lymph node removal and radiation ~30 years ago), Takayasu Arteritis, admitted for fall and concern for worsening dementia vs ativan induced. now improved and off Ativan taper.   Very paranoid, has been tapered off benzos, last dose 12/30 - resumed BZD bec concerned about withdrawal  1/5/21 diarrhea, +UA - will rx for UTI

## 2022-01-07 NOTE — PROGRESS NOTE ADULT - PROBLEM SELECTOR PLAN 3
Concern for worsening dementia with behavioral disturbances based on collateral provided by pt's son.  Robert body Dementia ?  - CT head negative for acute pathology  - According to ISTOP (Reference #: [id number]), pt has been regularly prescribed Xanax 0.5 mg qid PRN by her PCP and was last dispensed on 11/30/21. Per patient she has been taking 2 tabs in AM and 2 tabs in PM for years  - Frequent re-orientation  - Ensure proper sleep-wake cycle  - xanax tapered off as recommended by psych but had been on for years, concerned for any component of BZD withdrawal... - d/w psych c/w xanax PRN anxiety , added Haldol PRN for paranoia - psych will f/u in am --->>>> d/w psych resumed standing BZDs as concerned re BZD withdrawal - Xanax 0.25 bid, give Ativan 0.5 if she doesn't take the Xanax  -confused and encephalopathic, will give a dose of iv thiamine 500mg

## 2022-01-07 NOTE — CHART NOTE - NSCHARTNOTEFT_GEN_A_CORE
Source:   other [x] nurse, medical chart   Diet rx: Soft & Bite sized; DASH/TLC (cholesterol and sodium restricted); PO supplement: Ensure Enlive 8oz. 2x daily (~700 Kcal, ~40 gm Protein)      Pt 89 yo female with PMHx of dementia, HTN, HLD, Hypothyroidism, MDD, Constipation, Breast Cancer (s/p lymph node removal and radiation ~30 years ago), Takayasu Arteritis; Pt S/P Fall - per chart review.     At time of visit, Pt awake, appears anxious, agitated. Unable to obtain much information from Pt. Per nurse, for breakfast Pt ate a banana & ~20% of Ensure and for lunch Pt hardly ate anything. Untouched lunch tray at bed side at time of visit. Pt needs help with tray set-up reported. Of note, Pt passed Swallow Bedside Assessment adult, SLP rec: Soft & bite sized with thin liquids (1/3/22). No report of nausea, vomiting or diarrhea @ this time. Case discussed with nurse. RDN remains available.         Pt's height: 63" (12/24)     IBW: 115#+/-10%   Pt's weight: 64.7 kg (12/24)   Pertinent Medications: Lovenox, Miralax, Thiamin, Lipitor, Remeron,   Pertinent Labs: (1/7) Glu 106 H;    (12/25) HbA1c 5.2%;    (12/24) Albumin 4.2    Skin: +dryness/ecchymosis per flow sheet     Estimated Needs: [x] no change since previous assessment  Previous Nutrition Diagnosis: [x] Malnutrition, severe   Nutrition Diagnosis is [x] ongoing     Nutrition Interventions/Recommendations:   1. Continue PO diet as ordered; Monitor PO diet tolerance; ;   2. Honor food preferences;  3. Continue Remeron as ordered;  4. Add Lactobacillus Acidophilus (probiotic supplementation) to promote improved gut function;  5. Monitor labs, weekly weights, hydration status;

## 2022-01-08 PROCEDURE — 73200 CT UPPER EXTREMITY W/O DYE: CPT | Mod: 26,RT

## 2022-01-08 PROCEDURE — 99232 SBSQ HOSP IP/OBS MODERATE 35: CPT

## 2022-01-08 PROCEDURE — 76376 3D RENDER W/INTRP POSTPROCES: CPT | Mod: 26

## 2022-01-08 RX ORDER — THIAMINE MONONITRATE (VIT B1) 100 MG
100 TABLET ORAL DAILY
Refills: 0 | Status: DISCONTINUED | OUTPATIENT
Start: 2022-01-08 | End: 2022-01-17

## 2022-01-08 RX ORDER — CEFTRIAXONE 500 MG/1
1000 INJECTION, POWDER, FOR SOLUTION INTRAMUSCULAR; INTRAVENOUS EVERY 24 HOURS
Refills: 0 | Status: COMPLETED | OUTPATIENT
Start: 2022-01-09 | End: 2022-01-09

## 2022-01-08 RX ADMIN — Medication 50 MICROGRAM(S): at 05:48

## 2022-01-08 RX ADMIN — Medication 1 TABLET(S): at 05:47

## 2022-01-08 RX ADMIN — ATORVASTATIN CALCIUM 20 MILLIGRAM(S): 80 TABLET, FILM COATED ORAL at 23:01

## 2022-01-08 RX ADMIN — Medication 0.25 MILLIGRAM(S): at 09:16

## 2022-01-08 RX ADMIN — Medication 100 MILLIGRAM(S): at 15:30

## 2022-01-08 RX ADMIN — MIRTAZAPINE 15 MILLIGRAM(S): 45 TABLET, ORALLY DISINTEGRATING ORAL at 23:01

## 2022-01-08 RX ADMIN — ENOXAPARIN SODIUM 40 MILLIGRAM(S): 100 INJECTION SUBCUTANEOUS at 13:39

## 2022-01-08 RX ADMIN — Medication 1 TABLET(S): at 13:39

## 2022-01-08 RX ADMIN — SERTRALINE 100 MILLIGRAM(S): 25 TABLET, FILM COATED ORAL at 13:40

## 2022-01-08 RX ADMIN — Medication 0.25 MILLIGRAM(S): at 17:15

## 2022-01-08 RX ADMIN — POLYETHYLENE GLYCOL 3350 17 GRAM(S): 17 POWDER, FOR SOLUTION ORAL at 13:39

## 2022-01-08 RX ADMIN — Medication 1 TABLET(S): at 23:02

## 2022-01-08 NOTE — PROGRESS NOTE ADULT - SUBJECTIVE AND OBJECTIVE BOX
Dr. Mahsa Zuñiga  Pager [phone]    PROGRESS NOTE:     Patient is a 89y old  Female who presents with a chief complaint of Fall, dementia (07 Jan 2022 13:17)      SUBJECTIVE / OVERNIGHT EVENTS: denies chest pain/sob  ADDITIONAL REVIEW OF SYSTEMS: confused, looking for her money and cloth    MEDICATIONS  (STANDING):  ALPRAZolam 0.25 milliGRAM(s) Oral <User Schedule>  atorvastatin 20 milliGRAM(s) Oral at bedtime  enoxaparin Injectable 40 milliGRAM(s) SubCutaneous daily  lactobacillus acidophilus 1 Tablet(s) Oral three times a day  levothyroxine 50 MICROGram(s) Oral daily  mirtazapine 15 milliGRAM(s) Oral at bedtime  polyethylene glycol 3350 17 Gram(s) Oral daily  sertraline 100 milliGRAM(s) Oral daily  thiamine 100 milliGRAM(s) Oral daily  thiamine IVPB 500 milliGRAM(s) IV Intermittent daily    MEDICATIONS  (PRN):  acetaminophen     Tablet .. 650 milliGRAM(s) Oral every 6 hours PRN Temp greater or equal to 38C (100.4F), Moderate Pain (4 - 6)  artificial  tears Solution 1 Drop(s) Both EYES daily PRN Dry Eyes  haloperidol    Injectable 0.5 milliGRAM(s) IntraMuscular every 6 hours PRN paranoia  LORazepam   Injectable 0.5 milliGRAM(s) IV Push <User Schedule> PRN Not taking standing PO xanax  LORazepam   Injectable 0.5 milliGRAM(s) IntraMuscular <User Schedule> PRN Not taking standing PO Xanax      CAPILLARY BLOOD GLUCOSE        I&O's Summary      PHYSICAL EXAM:  Vital Signs Last 24 Hrs  T(C): 37.7 (08 Jan 2022 05:42), Max: 37.7 (08 Jan 2022 05:42)  T(F): 99.9 (08 Jan 2022 05:42), Max: 99.9 (08 Jan 2022 05:42)  HR: 74 (08 Jan 2022 05:42) (69 - 74)  BP: 159/56 (08 Jan 2022 05:42) (143/48 - 159/56)  BP(mean): --  RR: 18 (08 Jan 2022 05:42) (18 - 18)  SpO2: 95% (08 Jan 2022 05:42) (95% - 98%)  Gen: NAD; resting in bed. awake and alert.   Pulm: no respiratory distress; no accessory muscle use; CTA b/l; no wheezing; no crackles.   Cards: RRR, nl S1/S2; no obvious murmurs; no LE edema; no JVD  Abd: soft; NT on exam; +bs  Ext: no cyanosis; no joint effusion; right shoulder pain w/ rom  Skin: no rash; no cyanosis  PSYCH: paranoid, loud, anxious, a/o x2    LABS:                        11.7   9.71  )-----------( 342      ( 07 Jan 2022 07:15 )             37.0     01-07    138  |  102  |  20  ----------------------------<  106<H>  3.9   |  26  |  0.65    Ca    9.0      07 Jan 2022 07:15  Phos  2.7     01-07  Mg     1.80     01-07                  RADIOLOGY & ADDITIONAL TESTS:  Results Reviewed:   Imaging Personally Reviewed:  Electrocardiogram Personally Reviewed:    COORDINATION OF CARE:  Care Discussed with Consultants/Other Providers [Y/N]:  Prior or Outpatient Records Reviewed [Y/N]:

## 2022-01-08 NOTE — PROGRESS NOTE ADULT - PROBLEM SELECTOR PLAN 4
acute pain of right shoulder, last xray from   no swelling, but ROM is limited due to pain.   Xray neg for fx or dislocation, pt refused repeat xray Advanced AC and glenohumeral joint osteoarthrosis with joint surface remodeling changes. Thick superiorly projecting acromial articular margin  osteophyte, pending CT right shoulder   unable to participate with PT due to pain at this time.   tylenol prn for pain.

## 2022-01-08 NOTE — PROGRESS NOTE ADULT - PROBLEM SELECTOR PLAN 3
Concern for worsening dementia with behavioral disturbances based on collateral provided by pt's son.  Robert body Dementia ?  - CT head negative for acute pathology  - According to ISTOP (Reference #: [id number]), pt has been regularly prescribed Xanax 0.5 mg qid PRN by her PCP and was last dispensed on 11/30/21. Per patient she has been taking 2 tabs in AM and 2 tabs in PM for years  - Frequent re-orientation  - Ensure proper sleep-wake cycle  - xanax tapered off as recommended by psych but had been on for years, concerned for any component of BZD withdrawal... - d/w psych c/w xanax PRN anxiety , added Haldol PRN for paranoia - psych will f/u in am --->>>> d/w psych resumed standing BZDs as concerned re BZD withdrawal - Xanax 0.25 bid, give Ativan 0.5 if she doesn't take the Xanax  -confused and encephalopathic, given a dose of iv thiamine, start po thiamine

## 2022-01-09 LAB
ANION GAP SERPL CALC-SCNC: 12 MMOL/L — SIGNIFICANT CHANGE UP (ref 7–14)
BUN SERPL-MCNC: 15 MG/DL — SIGNIFICANT CHANGE UP (ref 7–23)
CALCIUM SERPL-MCNC: 9.5 MG/DL — SIGNIFICANT CHANGE UP (ref 8.4–10.5)
CHLORIDE SERPL-SCNC: 98 MMOL/L — SIGNIFICANT CHANGE UP (ref 98–107)
CO2 SERPL-SCNC: 26 MMOL/L — SIGNIFICANT CHANGE UP (ref 22–31)
CREAT SERPL-MCNC: 0.65 MG/DL — SIGNIFICANT CHANGE UP (ref 0.5–1.3)
GLUCOSE SERPL-MCNC: 93 MG/DL — SIGNIFICANT CHANGE UP (ref 70–99)
HCT VFR BLD CALC: 37.2 % — SIGNIFICANT CHANGE UP (ref 34.5–45)
HGB BLD-MCNC: 11.7 G/DL — SIGNIFICANT CHANGE UP (ref 11.5–15.5)
MAGNESIUM SERPL-MCNC: 2 MG/DL — SIGNIFICANT CHANGE UP (ref 1.6–2.6)
MCHC RBC-ENTMCNC: 29.3 PG — SIGNIFICANT CHANGE UP (ref 27–34)
MCHC RBC-ENTMCNC: 31.5 GM/DL — LOW (ref 32–36)
MCV RBC AUTO: 93.2 FL — SIGNIFICANT CHANGE UP (ref 80–100)
NRBC # BLD: 0 /100 WBCS — SIGNIFICANT CHANGE UP
NRBC # FLD: 0 K/UL — SIGNIFICANT CHANGE UP
PHOSPHATE SERPL-MCNC: 3.4 MG/DL — SIGNIFICANT CHANGE UP (ref 2.5–4.5)
PLATELET # BLD AUTO: 351 K/UL — SIGNIFICANT CHANGE UP (ref 150–400)
POTASSIUM SERPL-MCNC: 4.1 MMOL/L — SIGNIFICANT CHANGE UP (ref 3.5–5.3)
POTASSIUM SERPL-SCNC: 4.1 MMOL/L — SIGNIFICANT CHANGE UP (ref 3.5–5.3)
RBC # BLD: 3.99 M/UL — SIGNIFICANT CHANGE UP (ref 3.8–5.2)
RBC # FLD: 13.2 % — SIGNIFICANT CHANGE UP (ref 10.3–14.5)
SARS-COV-2 RNA SPEC QL NAA+PROBE: SIGNIFICANT CHANGE UP
SODIUM SERPL-SCNC: 136 MMOL/L — SIGNIFICANT CHANGE UP (ref 135–145)
WBC # BLD: 11.44 K/UL — HIGH (ref 3.8–10.5)
WBC # FLD AUTO: 11.44 K/UL — HIGH (ref 3.8–10.5)

## 2022-01-09 PROCEDURE — 99232 SBSQ HOSP IP/OBS MODERATE 35: CPT

## 2022-01-09 RX ADMIN — ATORVASTATIN CALCIUM 20 MILLIGRAM(S): 80 TABLET, FILM COATED ORAL at 22:17

## 2022-01-09 RX ADMIN — Medication 1 TABLET(S): at 12:45

## 2022-01-09 RX ADMIN — Medication 0.25 MILLIGRAM(S): at 08:02

## 2022-01-09 RX ADMIN — CEFTRIAXONE 100 MILLIGRAM(S): 500 INJECTION, POWDER, FOR SOLUTION INTRAMUSCULAR; INTRAVENOUS at 00:39

## 2022-01-09 RX ADMIN — SERTRALINE 100 MILLIGRAM(S): 25 TABLET, FILM COATED ORAL at 12:45

## 2022-01-09 RX ADMIN — ENOXAPARIN SODIUM 40 MILLIGRAM(S): 100 INJECTION SUBCUTANEOUS at 12:45

## 2022-01-09 RX ADMIN — Medication 0.25 MILLIGRAM(S): at 17:09

## 2022-01-09 RX ADMIN — MIRTAZAPINE 15 MILLIGRAM(S): 45 TABLET, ORALLY DISINTEGRATING ORAL at 22:16

## 2022-01-09 RX ADMIN — CEFTRIAXONE 100 MILLIGRAM(S): 500 INJECTION, POWDER, FOR SOLUTION INTRAMUSCULAR; INTRAVENOUS at 23:04

## 2022-01-09 RX ADMIN — POLYETHYLENE GLYCOL 3350 17 GRAM(S): 17 POWDER, FOR SOLUTION ORAL at 12:45

## 2022-01-09 RX ADMIN — Medication 105 MILLIGRAM(S): at 13:50

## 2022-01-09 RX ADMIN — Medication 100 MILLIGRAM(S): at 12:45

## 2022-01-09 RX ADMIN — Medication 1 TABLET(S): at 22:16

## 2022-01-09 RX ADMIN — Medication 50 MICROGRAM(S): at 05:36

## 2022-01-09 RX ADMIN — Medication 1 TABLET(S): at 05:37

## 2022-01-09 NOTE — PROGRESS NOTE ADULT - PROBLEM SELECTOR PLAN 4
acute pain of right shoulder, last xray from   no swelling, but ROM is limited due to pain.   Xray neg for fx or dislocation, pt refused repeat xray Advanced AC and glenohumeral joint osteoarthrosis with joint surface remodeling changes. 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   tylenol prn for pain.

## 2022-01-09 NOTE — PROGRESS NOTE ADULT - PROBLEM SELECTOR PLAN 3
Concern for worsening dementia with behavioral disturbances based on collateral provided by pt's son.  Robert body Dementia ?  - CT head negative for acute pathology  - According to ISTOP (Reference #: [id number]), pt has been regularly prescribed Xanax 0.5 mg qid PRN by her PCP and was last dispensed on 11/30/21. Per patient she has been taking 2 tabs in AM and 2 tabs in PM for years  - Frequent re-orientation  - Ensure proper sleep-wake cycle  - xanax tapered off as recommended by psych but had been on for years, concerned for any component of BZD withdrawal... - d/w psych c/w xanax PRN anxiety , added Haldol PRN for paranoia - psych will f/u in am --->>>> d/w psych resumed standing BZDs as concerned re BZD withdrawal - Xanax 0.25 bid, give Ativan 0.5 if she doesn't take the Xanax  -confused and encephalopathic, given a dose of iv thiamine, start po thiamine

## 2022-01-09 NOTE — CHART NOTE - NSCHARTNOTEFT_GEN_A_CORE
Called Orthopedics for right shoulder pain. Instructed by orthopedics to obtain a CT prior a consultation. 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 Informed by resident no inpatient workup required, will need to follow up with outpatient orthopedics.   Discussed with Dr. Zuñiga.

## 2022-01-09 NOTE — PROGRESS NOTE ADULT - SUBJECTIVE AND OBJECTIVE BOX
Dr. Mahsa Zuñiga  Pager [phone]    PROGRESS NOTE:     Patient is a 89y old  Female who presents with a chief complaint of Fall, dementia (08 Jan 2022 14:14)      SUBJECTIVE / OVERNIGHT EVENTS: still with right shoulder pain, wants to go home, not happy  ADDITIONAL REVIEW OF SYSTEMS: agitated this am    MEDICATIONS  (STANDING):  ALPRAZolam 0.25 milliGRAM(s) Oral <User Schedule>  atorvastatin 20 milliGRAM(s) Oral at bedtime  cefTRIAXone   IVPB 1000 milliGRAM(s) IV Intermittent every 24 hours  enoxaparin Injectable 40 milliGRAM(s) SubCutaneous daily  lactobacillus acidophilus 1 Tablet(s) Oral three times a day  levothyroxine 50 MICROGram(s) Oral daily  mirtazapine 15 milliGRAM(s) Oral at bedtime  polyethylene glycol 3350 17 Gram(s) Oral daily  sertraline 100 milliGRAM(s) Oral daily  thiamine 100 milliGRAM(s) Oral daily  thiamine IVPB 500 milliGRAM(s) IV Intermittent daily    MEDICATIONS  (PRN):  acetaminophen     Tablet .. 650 milliGRAM(s) Oral every 6 hours PRN Temp greater or equal to 38C (100.4F), Moderate Pain (4 - 6)  artificial  tears Solution 1 Drop(s) Both EYES daily PRN Dry Eyes  haloperidol    Injectable 0.5 milliGRAM(s) IntraMuscular every 6 hours PRN paranoia  LORazepam   Injectable 0.5 milliGRAM(s) IV Push <User Schedule> PRN Not taking standing PO xanax  LORazepam   Injectable 0.5 milliGRAM(s) IntraMuscular <User Schedule> PRN Not taking standing PO Xanax      CAPILLARY BLOOD GLUCOSE        I&O's Summary      PHYSICAL EXAM:  Vital Signs Last 24 Hrs  T(C): 37.3 (09 Jan 2022 12:16), Max: 37.3 (09 Jan 2022 12:16)  T(F): 99.2 (09 Jan 2022 12:16), Max: 99.2 (09 Jan 2022 12:16)  HR: 96 (09 Jan 2022 12:16) (73 - 96)  BP: 140/56 (09 Jan 2022 12:16) (110/60 - 140/56)  BP(mean): --  RR: 18 (09 Jan 2022 12:16) (18 - 18)  SpO2: 94% (09 Jan 2022 12:16) (94% - 95%)  Gen: NAD; resting in bed. awake and alert.   Pulm: no respiratory distress; no accessory muscle use; CTA b/l; no wheezing; no crackles.   Cards: RRR, nl S1/S2; no obvious murmurs; no LE edema; no JVD  Abd: soft; NT on exam; +bs  Ext: no cyanosis; no joint effusion; right shoulder pain w/ rom  Skin: no rash; no cyanosis  PSYCH: paranoid, loud, anxious, a/o x2    LABS:                        11.7   11.44 )-----------( 351      ( 09 Jan 2022 06:32 )             37.2     01-09    136  |  98  |  15  ----------------------------<  93  4.1   |  26  |  0.65    Ca    9.5      09 Jan 2022 06:32  Phos  3.4     01-09  Mg     2.00     01-09                  RADIOLOGY & ADDITIONAL TESTS:  Results Reviewed:   Imaging Personally Reviewed:  < from: CT 3D Reconstruct w/o Workstation (01.08.22 @ 16:41) >    IMPRESSION:  1.  No acute fracture or dislocation.  2.  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        Electrocardiogram Personally Reviewed:    COORDINATION OF CARE:  Care Discussed with Consultants/Other Providers [Y/N]: jorge Dockery consult  Prior or Outpatient Records Reviewed [Y/N]:

## 2022-01-10 LAB
ANION GAP SERPL CALC-SCNC: 14 MMOL/L — SIGNIFICANT CHANGE UP (ref 7–14)
BUN SERPL-MCNC: 16 MG/DL — SIGNIFICANT CHANGE UP (ref 7–23)
CALCIUM SERPL-MCNC: 9.3 MG/DL — SIGNIFICANT CHANGE UP (ref 8.4–10.5)
CHLORIDE SERPL-SCNC: 96 MMOL/L — LOW (ref 98–107)
CO2 SERPL-SCNC: 26 MMOL/L — SIGNIFICANT CHANGE UP (ref 22–31)
CREAT SERPL-MCNC: 0.71 MG/DL — SIGNIFICANT CHANGE UP (ref 0.5–1.3)
GLUCOSE SERPL-MCNC: 111 MG/DL — HIGH (ref 70–99)
HCT VFR BLD CALC: 37.9 % — SIGNIFICANT CHANGE UP (ref 34.5–45)
HGB BLD-MCNC: 12.4 G/DL — SIGNIFICANT CHANGE UP (ref 11.5–15.5)
MAGNESIUM SERPL-MCNC: 2.1 MG/DL — SIGNIFICANT CHANGE UP (ref 1.6–2.6)
MCHC RBC-ENTMCNC: 29.5 PG — SIGNIFICANT CHANGE UP (ref 27–34)
MCHC RBC-ENTMCNC: 32.7 GM/DL — SIGNIFICANT CHANGE UP (ref 32–36)
MCV RBC AUTO: 90 FL — SIGNIFICANT CHANGE UP (ref 80–100)
NRBC # BLD: 0 /100 WBCS — SIGNIFICANT CHANGE UP
NRBC # FLD: 0 K/UL — SIGNIFICANT CHANGE UP
PHOSPHATE SERPL-MCNC: 3.3 MG/DL — SIGNIFICANT CHANGE UP (ref 2.5–4.5)
PLATELET # BLD AUTO: 381 K/UL — SIGNIFICANT CHANGE UP (ref 150–400)
POTASSIUM SERPL-MCNC: 4.3 MMOL/L — SIGNIFICANT CHANGE UP (ref 3.5–5.3)
POTASSIUM SERPL-SCNC: 4.3 MMOL/L — SIGNIFICANT CHANGE UP (ref 3.5–5.3)
RBC # BLD: 4.21 M/UL — SIGNIFICANT CHANGE UP (ref 3.8–5.2)
RBC # FLD: 13.2 % — SIGNIFICANT CHANGE UP (ref 10.3–14.5)
SODIUM SERPL-SCNC: 136 MMOL/L — SIGNIFICANT CHANGE UP (ref 135–145)
WBC # BLD: 13.13 K/UL — HIGH (ref 3.8–10.5)
WBC # FLD AUTO: 13.13 K/UL — HIGH (ref 3.8–10.5)

## 2022-01-10 PROCEDURE — 99233 SBSQ HOSP IP/OBS HIGH 50: CPT

## 2022-01-10 RX ADMIN — Medication 50 MICROGRAM(S): at 05:51

## 2022-01-10 RX ADMIN — Medication 100 MILLIGRAM(S): at 11:17

## 2022-01-10 RX ADMIN — ENOXAPARIN SODIUM 40 MILLIGRAM(S): 100 INJECTION SUBCUTANEOUS at 11:17

## 2022-01-10 RX ADMIN — Medication 0.25 MILLIGRAM(S): at 16:48

## 2022-01-10 RX ADMIN — MIRTAZAPINE 15 MILLIGRAM(S): 45 TABLET, ORALLY DISINTEGRATING ORAL at 22:10

## 2022-01-10 RX ADMIN — Medication 1 TABLET(S): at 05:51

## 2022-01-10 RX ADMIN — ATORVASTATIN CALCIUM 20 MILLIGRAM(S): 80 TABLET, FILM COATED ORAL at 22:10

## 2022-01-10 RX ADMIN — Medication 1 TABLET(S): at 22:10

## 2022-01-10 RX ADMIN — Medication 0.25 MILLIGRAM(S): at 08:00

## 2022-01-10 RX ADMIN — POLYETHYLENE GLYCOL 3350 17 GRAM(S): 17 POWDER, FOR SOLUTION ORAL at 11:17

## 2022-01-10 RX ADMIN — Medication 650 MILLIGRAM(S): at 06:05

## 2022-01-10 RX ADMIN — SERTRALINE 100 MILLIGRAM(S): 25 TABLET, FILM COATED ORAL at 11:17

## 2022-01-10 RX ADMIN — Medication 1 TABLET(S): at 11:23

## 2022-01-10 RX ADMIN — Medication 105 MILLIGRAM(S): at 11:16

## 2022-01-10 RX ADMIN — Medication 650 MILLIGRAM(S): at 05:51

## 2022-01-10 NOTE — PROGRESS NOTE ADULT - PROBLEM SELECTOR PLAN 3
Central Line    Patient location during procedure: OR  Indications: central pressure monitoring and vascular access  Staff  Anesthesiologist: TIEN FUENTES  Preanesthetic Checklist  Completed: patient identified, IV checked, risks and benefits discussed and monitors and equipment checked  Central Line Prep  Sterile Tech:cap, gloves, gown, mask and sterile barriers  Prep: chloraprep  Patient monitoring: continuous pulse oximetry, blood pressure monitoring and EKG  Central Line Procedure  Location:internal jugular  Catheter Type:Cordis  Catheter Size:7 Fr  Guidance:landmark technique and palpation technique  Assessment  Post procedure:biopatch applied, line sutured and occlusive dressing applied  Assessement:blood return through all ports, free fluid flow and chest x-ray ordered  Complications:no  Patient Tolerance:patient tolerated the procedure well with no apparent complications             Concern for worsening dementia with behavioral disturbances based on collateral provided by pt's son.  Robert body Dementia ?  - CT head negative for acute pathology  - According to ISTOP (Reference #: [id number]), pt has been regularly prescribed Xanax 0.5 mg qid PRN by her PCP and was last dispensed on 11/30/21. Per patient she has been taking 2 tabs in AM and 2 tabs in PM for years  - Frequent re-orientation  - Ensure proper sleep-wake cycle  - xanax tapered off as recommended by psych but had been on for years, concerned for any component of BZD withdrawal... - d/w psych c/w xanax PRN anxiety , added Haldol PRN for paranoia  - d/w psych resumed standing BZDs as concerned re BZD withdrawal - Xanax 0.25 bid, give Ativan 0.5 if she doesn't take the Xanax  -confused and encephalopathic, given a dose of iv thiamine, c/w po thiamine

## 2022-01-10 NOTE — PROGRESS NOTE ADULT - PROBLEM SELECTOR PLAN 1
RESOLVED  c. diff neg  GI PCR neg  s/p IVF, probiotic  Dispo: PT rec [facility] , covid swab (last neg on 1/9)

## 2022-01-10 NOTE — PROGRESS NOTE ADULT - PROBLEM SELECTOR PLAN 12
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  - Not on blood thinners per son, no ASA or Plavix  - Monitor for changes in upper extremities    C/w LMWH for DVT PPx  D/w ACP and during IDR's  Awaiting [facility] acceptances at this time

## 2022-01-10 NOTE — PROGRESS NOTE ADULT - ASSESSMENT
89 y/o F, PMH of dementia, HTN, HLD, Hypothyroidism, MDD, Constipation, Breast Cancer (s/p lymph node removal and radiation ~30 years ago), Takayasu Arteritis, admitted for fall and concern for worsening dementia vs ativan induced. now improved and off Ativan taper.   Very paranoid, has been tapered off benzos, last dose 12/30 - resumed BZD bec concerned about withdrawal  1/5/21 diarrhea, +UA - will rx for UTI

## 2022-01-10 NOTE — PROGRESS NOTE ADULT - SUBJECTIVE AND OBJECTIVE BOX
Patient is a 89y old  Female who presents with a chief complaint of Fall, dementia (09 Jan 2022 13:07)      INTERVAL HPI/OVERNIGHT EVENTS:  Seen by me this morning, resting comfortably in bed. No complaints. Decreased appetite.    Review of Systems: 12 point review of systems otherwise negative    MEDICATIONS  (STANDING):  ALPRAZolam 0.25 milliGRAM(s) Oral <User Schedule>  atorvastatin 20 milliGRAM(s) Oral at bedtime  enoxaparin Injectable 40 milliGRAM(s) SubCutaneous daily  lactobacillus acidophilus 1 Tablet(s) Oral three times a day  levothyroxine 50 MICROGram(s) Oral daily  mirtazapine 15 milliGRAM(s) Oral at bedtime  polyethylene glycol 3350 17 Gram(s) Oral daily  sertraline 100 milliGRAM(s) Oral daily  thiamine 100 milliGRAM(s) Oral daily    MEDICATIONS  (PRN):  acetaminophen     Tablet .. 650 milliGRAM(s) Oral every 6 hours PRN Temp greater or equal to 38C (100.4F), Moderate Pain (4 - 6)  artificial  tears Solution 1 Drop(s) Both EYES daily PRN Dry Eyes  haloperidol    Injectable 0.5 milliGRAM(s) IntraMuscular every 6 hours PRN paranoia  LORazepam   Injectable 0.5 milliGRAM(s) IV Push <User Schedule> PRN Not taking standing PO xanax  LORazepam   Injectable 0.5 milliGRAM(s) IntraMuscular <User Schedule> PRN Not taking standing PO Xanax      Allergies    No Known Allergies    Intolerances          Vital Signs Last 24 Hrs  T(C): 37 (10 Noble 2022 11:09), Max: 37.1 (09 Jan 2022 22:35)  T(F): 98.6 (10 Noble 2022 11:09), Max: 98.8 (09 Jan 2022 22:35)  HR: 63 (10 Noble 2022 11:09) (63 - 84)  BP: 110/51 (10 Noble 2022 11:09) (110/51 - 138/54)  BP(mean): --  RR: 18 (10 Noble 2022 11:09) (18 - 18)  SpO2: 96% (10 Noble 2022 11:09) (95% - 96%)  CAPILLARY BLOOD GLUCOSE          01-09 @ 07:01  -  01-10 @ 07:00  --------------------------------------------------------  IN: 250 mL / OUT: 0 mL / NET: 250 mL    01-10 @ 07:01  -  01-10 @ 20:31  --------------------------------------------------------  IN: 300 mL / OUT: 0 mL / NET: 300 mL        Physical Exam:    Daily     Daily   General:  Well appearing, NAD, cachetic  HEENT:  Nonicteric, PERRLA  CV:  RRR, no murmur, no JVD  Lungs:  CTA B/L, no wheezes, rales, rhonchi  Abdomen:  Soft, non-tender, no distended, positive BS  Extremities:  2+ pulses, no c/c, no edema  Skin:  Warm and dry, no rashes  :  No mckeon  Neuro:  Alert, non-focal, CN II-XII grossly intact  No Restraints    LABS:                        12.4   13.13 )-----------( 381      ( 10 Noble 2022 06:42 )             37.9     01-10    136  |  96<L>  |  16  ----------------------------<  111<H>  4.3   |  26  |  0.71    Ca    9.3      10 Noble 2022 06:42  Phos  3.3     01-10  Mg     2.10     01-10              RADIOLOGY & ADDITIONAL TESTS:  Reviewed by me

## 2022-01-10 NOTE — PROGRESS NOTE ADULT - PROBLEM SELECTOR PLAN 4
acute pain of right shoulder  no swelling, but ROM is limited due to pain.   Xray neg for fx or dislocation, pt refused repeat xray Advanced AC and glenohumeral joint osteoarthrosis with joint surface remodeling changes. 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   tylenol prn for pain.

## 2022-01-10 NOTE — PROGRESS NOTE ADULT - PROBLEM SELECTOR PLAN 5
Pt with fall at home, likely mechanical in nature in setting of debility/worsening functional status 2/2 dementia  - CK elevated to 331, likely from fall  - Mental status - AAO to self and place. Agitated at times.   - CT head and C-spine negative for acute pathology, C-collar cleared in ED  - EKG showed ST depression in lateral leads I and aVL, QTc 420 ms, hST 28-->34, chest pain free  - normal orthostatics  - PT consult - [facility] (last seen 1/9)  - Fall precautions, seizure precautions, bed alarm

## 2022-01-10 NOTE — PROGRESS NOTE ADULT - PROBLEM SELECTOR PLAN 10
Pt has chronic constipation.  - Monitor for BM with stool count  --->>>now with diarrhea. that seems to have resolved.

## 2022-01-11 ENCOUNTER — TRANSCRIPTION ENCOUNTER (OUTPATIENT)
Age: 87
End: 2022-01-11

## 2022-01-11 LAB
ANION GAP SERPL CALC-SCNC: 11 MMOL/L — SIGNIFICANT CHANGE UP (ref 7–14)
BUN SERPL-MCNC: 25 MG/DL — HIGH (ref 7–23)
CALCIUM SERPL-MCNC: 10.1 MG/DL — SIGNIFICANT CHANGE UP (ref 8.4–10.5)
CHLORIDE SERPL-SCNC: 99 MMOL/L — SIGNIFICANT CHANGE UP (ref 98–107)
CO2 SERPL-SCNC: 28 MMOL/L — SIGNIFICANT CHANGE UP (ref 22–31)
CREAT SERPL-MCNC: 0.72 MG/DL — SIGNIFICANT CHANGE UP (ref 0.5–1.3)
GLUCOSE SERPL-MCNC: 94 MG/DL — SIGNIFICANT CHANGE UP (ref 70–99)
HCT VFR BLD CALC: 40.6 % — SIGNIFICANT CHANGE UP (ref 34.5–45)
HGB BLD-MCNC: 12.6 G/DL — SIGNIFICANT CHANGE UP (ref 11.5–15.5)
MAGNESIUM SERPL-MCNC: 2.3 MG/DL — SIGNIFICANT CHANGE UP (ref 1.6–2.6)
MCHC RBC-ENTMCNC: 29.4 PG — SIGNIFICANT CHANGE UP (ref 27–34)
MCHC RBC-ENTMCNC: 31 GM/DL — LOW (ref 32–36)
MCV RBC AUTO: 94.9 FL — SIGNIFICANT CHANGE UP (ref 80–100)
NRBC # BLD: 0 /100 WBCS — SIGNIFICANT CHANGE UP
NRBC # FLD: 0 K/UL — SIGNIFICANT CHANGE UP
PHOSPHATE SERPL-MCNC: 3.3 MG/DL — SIGNIFICANT CHANGE UP (ref 2.5–4.5)
PLATELET # BLD AUTO: 381 K/UL — SIGNIFICANT CHANGE UP (ref 150–400)
POTASSIUM SERPL-MCNC: 4.5 MMOL/L — SIGNIFICANT CHANGE UP (ref 3.5–5.3)
POTASSIUM SERPL-SCNC: 4.5 MMOL/L — SIGNIFICANT CHANGE UP (ref 3.5–5.3)
RBC # BLD: 4.28 M/UL — SIGNIFICANT CHANGE UP (ref 3.8–5.2)
RBC # FLD: 13.3 % — SIGNIFICANT CHANGE UP (ref 10.3–14.5)
SODIUM SERPL-SCNC: 138 MMOL/L — SIGNIFICANT CHANGE UP (ref 135–145)
WBC # BLD: 11.22 K/UL — HIGH (ref 3.8–10.5)
WBC # FLD AUTO: 11.22 K/UL — HIGH (ref 3.8–10.5)

## 2022-01-11 PROCEDURE — 99239 HOSP IP/OBS DSCHRG MGMT >30: CPT

## 2022-01-11 RX ORDER — LACTOBACILLUS ACIDOPHILUS 100MM CELL
1 CAPSULE ORAL
Qty: 0 | Refills: 0 | DISCHARGE
Start: 2022-01-11

## 2022-01-11 RX ORDER — ATORVASTATIN CALCIUM 80 MG/1
1 TABLET, FILM COATED ORAL
Qty: 0 | Refills: 0 | DISCHARGE
Start: 2022-01-11

## 2022-01-11 RX ORDER — THIAMINE MONONITRATE (VIT B1) 100 MG
1 TABLET ORAL
Qty: 0 | Refills: 0 | DISCHARGE
Start: 2022-01-11

## 2022-01-11 RX ORDER — ALPRAZOLAM 0.25 MG
1 TABLET ORAL
Qty: 0 | Refills: 0 | DISCHARGE
Start: 2022-01-11

## 2022-01-11 RX ORDER — ACETAMINOPHEN 500 MG
2 TABLET ORAL
Qty: 0 | Refills: 0 | DISCHARGE
Start: 2022-01-11

## 2022-01-11 RX ORDER — POLYETHYLENE GLYCOL 3350 17 G/17G
17 POWDER, FOR SOLUTION ORAL
Qty: 0 | Refills: 0 | DISCHARGE
Start: 2022-01-11

## 2022-01-11 RX ORDER — MIRTAZAPINE 45 MG/1
1 TABLET, ORALLY DISINTEGRATING ORAL
Qty: 0 | Refills: 0 | DISCHARGE
Start: 2022-01-11

## 2022-01-11 RX ORDER — LEVOTHYROXINE SODIUM 125 MCG
1 TABLET ORAL
Qty: 0 | Refills: 0 | DISCHARGE
Start: 2022-01-11

## 2022-01-11 RX ORDER — SERTRALINE 25 MG/1
1 TABLET, FILM COATED ORAL
Qty: 0 | Refills: 0 | DISCHARGE
Start: 2022-01-11

## 2022-01-11 RX ADMIN — SERTRALINE 100 MILLIGRAM(S): 25 TABLET, FILM COATED ORAL at 13:13

## 2022-01-11 RX ADMIN — Medication 1 TABLET(S): at 21:07

## 2022-01-11 RX ADMIN — ATORVASTATIN CALCIUM 20 MILLIGRAM(S): 80 TABLET, FILM COATED ORAL at 21:07

## 2022-01-11 RX ADMIN — Medication 1 TABLET(S): at 05:30

## 2022-01-11 RX ADMIN — ENOXAPARIN SODIUM 40 MILLIGRAM(S): 100 INJECTION SUBCUTANEOUS at 13:13

## 2022-01-11 RX ADMIN — Medication 0.25 MILLIGRAM(S): at 18:15

## 2022-01-11 RX ADMIN — MIRTAZAPINE 15 MILLIGRAM(S): 45 TABLET, ORALLY DISINTEGRATING ORAL at 21:06

## 2022-01-11 RX ADMIN — Medication 100 MILLIGRAM(S): at 13:13

## 2022-01-11 RX ADMIN — Medication 0.25 MILLIGRAM(S): at 08:29

## 2022-01-11 RX ADMIN — Medication 50 MICROGRAM(S): at 05:31

## 2022-01-11 RX ADMIN — Medication 1 TABLET(S): at 13:13

## 2022-01-11 NOTE — DISCHARGE NOTE PROVIDER - DETAILS OF MALNUTRITION DIAGNOSIS/DIAGNOSES
This patient has been assessed with a concern for Malnutrition and was treated during this hospitalization for the following Nutrition diagnosis/diagnoses:     -  01/03/2022: Severe protein-calorie malnutrition

## 2022-01-11 NOTE — PROGRESS NOTE ADULT - PROBLEM SELECTOR PLAN 12
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  - Not on blood thinners per son, no ASA or Plavix  - Monitor for changes in upper extremities    C/w LMWH for DVT PPx  D/w ACP and during IDR's  Medically stable for discharge today to [facility]  >30 min dc planning

## 2022-01-11 NOTE — DISCHARGE NOTE NURSING/CASE MANAGEMENT/SOCIAL WORK - PATIENT PORTAL LINK FT
Patient You can access the FollowMyHealth Patient Portal offered by [facility] by registering at the following website: [email or web address]. By joining [employer]’s FollowMyHealth portal, you will also be able to view your health information using other applications (apps) compatible with our system.

## 2022-01-11 NOTE — PROGRESS NOTE ADULT - SUBJECTIVE AND OBJECTIVE BOX
Patient is a 89y old  Female who presents with a chief complaint of Fall, dementia (10 Noble 2022 20:31)      INTERVAL HPI/OVERNIGHT EVENTS:  Seen by me this morning, doing well. Just wants to go home. No complains. Asking for water.    Review of Systems: 12 point review of systems otherwise negative    MEDICATIONS  (STANDING):  ALPRAZolam 0.25 milliGRAM(s) Oral <User Schedule>  atorvastatin 20 milliGRAM(s) Oral at bedtime  enoxaparin Injectable 40 milliGRAM(s) SubCutaneous daily  lactobacillus acidophilus 1 Tablet(s) Oral three times a day  levothyroxine 50 MICROGram(s) Oral daily  mirtazapine 15 milliGRAM(s) Oral at bedtime  polyethylene glycol 3350 17 Gram(s) Oral daily  sertraline 100 milliGRAM(s) Oral daily  thiamine 100 milliGRAM(s) Oral daily    MEDICATIONS  (PRN):  acetaminophen     Tablet .. 650 milliGRAM(s) Oral every 6 hours PRN Temp greater or equal to 38C (100.4F), Moderate Pain (4 - 6)  artificial  tears Solution 1 Drop(s) Both EYES daily PRN Dry Eyes      Allergies    No Known Allergies    Intolerances          Vital Signs Last 24 Hrs  T(C): 36.4 (11 Jan 2022 14:00), Max: 37 (11 Jan 2022 05:30)  T(F): 97.6 (11 Jan 2022 14:00), Max: 98.6 (11 Jan 2022 05:30)  HR: 73 (11 Jan 2022 14:00) (73 - 89)  BP: 115/49 (11 Jan 2022 14:00) (110/68 - 117/57)  BP(mean): --  RR: 18 (11 Jan 2022 14:00) (17 - 18)  SpO2: 95% (11 Jan 2022 14:00) (95% - 99%)  CAPILLARY BLOOD GLUCOSE          01-10 @ 07:01  -  01-11 @ 07:00  --------------------------------------------------------  IN: 300 mL / OUT: 0 mL / NET: 300 mL        Physical Exam:    Daily     Daily   General:  Well appearing, NAD, cachetic  HEENT:  Nonicteric, PERRLA  CV:  RRR, no murmur, no JVD  Lungs:  CTA B/L, no wheezes, rales, rhonchi  Abdomen:  Soft, non-tender, no distended, positive BS  Extremities:  2+ pulses, no c/c, no edema  Skin:  Warm and dry, no rashes  :  No mckeon  Neuro:  Alert, non-focal, CN II-XII grossly intact  No Restraints    LABS:                        12.6   11.22 )-----------( 381      ( 11 Jan 2022 06:41 )             40.6     01-11    138  |  99  |  25<H>  ----------------------------<  94  4.5   |  28  |  0.72    Ca    10.1      11 Jan 2022 06:41  Phos  3.3     01-11  Mg     2.30     01-11              RADIOLOGY & ADDITIONAL TESTS:  Reviewed by me

## 2022-01-11 NOTE — DISCHARGE NOTE PROVIDER - NSDCCPCAREPLAN_GEN_ALL_CORE_FT
PRINCIPAL DISCHARGE DIAGNOSIS  Diagnosis: Delirium with dementia  Assessment and Plan of Treatment: You were seen by our psych team who adjusted your medications. Continue your medications as directed and follow up with your PCP and psychiatrist for further evaluation and medical management. If you are ever in need of immediate psychiatric assistance you may reach out to the [facility] [phone].      SECONDARY DISCHARGE DIAGNOSES  Diagnosis: HLD (hyperlipidemia)  Assessment and Plan of Treatment:     Diagnosis: HTN (hypertension)  Assessment and Plan of Treatment: Continue blood pressure medication regimen as directed. Monitor for any visual changes, headaches or dizziness.  Monitor blood pressure regularly.  Follow up with your PCP for further management for high blood pressure.     PRINCIPAL DISCHARGE DIAGNOSIS  Diagnosis: Dementia  Assessment and Plan of Treatment: Please continue your medications as prescribed and allow help with daily acitivities of living. Maintain a safe environment and make movements in a careful manner to prevent falls. Ensure that you are eating and drinking adequately and maintaining a healthy sleep cycle.   If you are in need of assistance with medication adjustment you can follow-up with your outpatient provider or refer to the [facility] clinic by calling [phone].      SECONDARY DISCHARGE DIAGNOSES  Diagnosis: Acute UTI  Assessment and Plan of Treatment: You were started on antibiotics for a urinary infection. To help prevent future infections maintain healthy hygiene and avoid taking long baths. Wipe from front to back and empty your bladder frequently by keeping yourself properly hydrated.   Monitor for signs/symptoms of infection, such as, fever/chills, burning/pain with urination, urinary frequency/hesitancy, cloudy urine, or blood in urine and follow-up with your primary care provider as outpatient for further care.    Diagnosis: Hypothyroidism  Assessment and Plan of Treatment: Continue your thyroid medications as recommended and follow-up with your outpatient provider for continual thyroid function testing within 4-6 weeks for routine labs.     PRINCIPAL DISCHARGE DIAGNOSIS  Diagnosis: Dementia  Assessment and Plan of Treatment: Please continue your medications as prescribed and allow help with daily acitivities of living. Maintain a safe environment and make movements in a careful manner to prevent falls. Ensure that you are eating and drinking adequately and maintaining a healthy sleep cycle.   If you are in need of assistance with medication adjustment you can follow-up with your outpatient provider or refer to the [facility]iat[facility] clinic by calling [phone].      SECONDARY DISCHARGE DIAGNOSES  Diagnosis: Hypothyroidism  Assessment and Plan of Treatment: Continue your thyroid medications as recommended and follow-up with your outpatient provider for continual thyroid function testing within 4-6 weeks for routine labs.    Diagnosis: Acute UTI  Assessment and Plan of Treatment: You were started on antibiotics for a urinary infection. To help prevent future infections maintain healthy hygiene and avoid taking long baths. Wipe from front to back and empty your bladder frequently by keeping yourself properly hydrated.   Monitor for signs/symptoms of infection, such as, fever/chills, burning/pain with urination, urinary frequency/hesitancy, cloudy urine, or blood in urine and follow-up with your primary care provider as outpatient for further care.    Diagnosis: Acute diarrhea  Assessment and Plan of Treatment: Pending specimen to be sent   Vanco po 125 x 10 days

## 2022-01-11 NOTE — PROGRESS NOTE ADULT - PROBLEM SELECTOR PLAN 5
Pt with fall at home, likely mechanical in nature in setting of debility/worsening functional status 2/2 dementia  - CK elevated to 331, likely from fall  - Mental status - AAO to self and place. Agitated at times.   - CT head and C-spine negative for acute pathology, C-collar cleared in ED  - EKG showed ST depression in lateral leads I and aVL, QTc 420 ms, hST 28-->34, chest pain free  - normal orthostatics  - PT consult - [facility] (last seen 1/9)  - Fall precautions, seizure precautions, bed alarm

## 2022-01-11 NOTE — DISCHARGE NOTE PROVIDER - HOSPITAL COURSE
89 y/o F, PMH of dementia, HTN, HLD, Hypothyroidism, MDD, Constipation, Breast Cancer (s/p lymph node removal and radiation ~30 years ago), Takayasu Arteritis, admitted for fall and concern for worsening dementia vs ativan induced.     Diarrhea.   RESOLVED  c. diff neg  GI PCR neg  s/p IVF, probiotic    Acute UTI.   UA grossly +, Urine cx proteus, completed 5 day course of Ceftriaxone 1/9  c/w probiotic.    Dementia.   - Concern for worsening dementia with behavioral disturbances based on collateral provided by pt's son.  Robert body Dementia ?  - CT head negative for acute pathology  - According to [employer]STOP (Reference #: [id number]), pt has been regularly prescribed Xanax 0.5 mg qid PRN by her PCP and was last dispensed on 11/30/21. Per patient she has been taking 2 tabs in AM and 2 tabs in PM for years  - Frequent re-orientation  - Ensure proper sleep-wake cycle  - xanax tapered off as recommended by psych but had been on for years, concerned for any component of BZD withdrawal... - d/w psych c/w xanax PRN anxiety , added Haldol PRN for paranoia  - d/w psych resumed standing BZDs as concerned re BZD withdrawal - Xanax 0.25 bid  -confused and encephalopathic, given a dose of iv thiamine, c/w po thiamine.    Shoulder pain, left.   acute pain of right shoulder  no swelling, but ROM is limited due to pain.   Xray neg for fx or dislocation, pt refused repeat xray Advanced AC and glenohumeral joint osteoarthrosis with joint surface remodeling changes. 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   tylenol prn for pain.    Debility.   -Pt with fall at home, likely mechanical in nature in setting of debility/worsening functional status 2/2 dementia  - CK elevated to 331, likely from fall  - Mental status - AAO to self and place. Agitated at times.   - CT head and C-spine negative for acute pathology, C-collar cleared in ED  - EKG showed ST depression in lateral leads I and aVL, QTc 420 ms, hST 28-->34, chest pain free  - normal orthostatics  - PT consult - [facility] (last seen 1/9)  - Fall precautions, seizure precautions, bed alarm.    HTN (hypertension).   BPs in acceptable range.    HLD (hyperlipidemia).   -c/w statin.     Hypothyroidism.   -c/w synthroid.     Major depression.   -Pt has made suicidal threats to her son over the phone in the past, denies SI/HI now  -psych input appreciated   -c/w remeron and zoloft.     Constipation.   - Pt has chronic constipation.  - Monitor for BM with stool count  --->>>now with diarrhea. that seems to have resolved.    Breast cancer.   According to pt's son, pt s/p lymph node removal and radiation 30 years ago.    Takayasu's arteritis.   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  - Not on blood thinners per son, no ASA or Plavix  - Monitor for changes in upper extremities    C/w LMWH for DVT PPx    Medically stable for discharge today to [facility]    On 1/11 this case was reviewed with Dr. Roy, the patient is medically stable and optimized for discharge. All medications were reviewed and prescriptions were sent to mutually agreed upon pharmacy.    89F PMH of dementia, HTN, HLD, Hypothyroidism, MDD, constipation, Breast Cancer (s/p lymph node removal and radiation ~30 years ago), Takayasu Arteritis, admitted for fall and concern for worsening dementia    Debility.   - Pt with fall at home, likely mechanical in nature in setting of debility/worsening functional status 2/2 dementia; CK elevated to 331, likely from fall  - Mental status hallucinating; CT head and C-spine negative for acute pathology, C-collar cleared in ED  - XR of R shoulder (bruise noted to shoulder) no fractures, dislocations, or AC separation. Evidence of bony spurring in shoulder w/ generalized osteopenia otherwise no discrete lytic or blastic lesions.  - XR of R Hip w/ generalized osteopenia otherwise no discrete lytic or blastic lesions.    - EKG showed ST depression in lateral leads I and aVL, QTc 420 ms, hST 28  - TTE w/ hyperdynamic left ventricle and NL RV size and function; Orthostatics negative  - Neuro checks q4 hours; Fall precautions, seizure precautions, bed alarm.  - CT R shoulder: 1.  No acute fracture or dislocation. 2.  Severe osteoarthritis of the right glenohumeral joint  Leukocytosis w/ diarrhea; GI PCR - negative     Hypoxia - Noted w/ low grade 99.5 temp 12/26 and hypoxia; Urine/Blood Cx NGTD; On 3L NC; repeat COVID negative 12/27; Now on RA    Dementia concern for worsening dementia with behavioral disturbances based on collateral provided by pt's son.  - CT head negative for acute pathology  - According to ISTOP (Reference #: [id number]), pt has been regularly prescribed Xanax 0.5 mg QID PRN by her PCP and was last dispensed on 11/30/21. Concern for BZD withdrawal resumed low dose Xanax 0.25mg BID  - In setting of fall risk with eldery and BZD use will taper off completely then can use small dose Xanax 0.25mg PO TID PRN   - DC Zyprexa 1.25mg IM q6h PRN for agitation, QTc 420 ms on admission EKG, consider a sitter as trying to get out of bed , high risk of falling, hallucinations    Hypothyroidism continued on Synthroid 50mch PO QD; TSH elevated w/ normal T4    Major depression has made suicidal threats to her son over the phone in the past, denies SI/HI now; Mirtazapine 15mg PO qHS resumed; Sertraline initially on hold 2/2 NPO status then resumed at lower dosage    Breast cancer s/p lymph node removal and radiation 30 years ago.    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    Dispo - Rehab as discussed w/ Dr. Roy 89F PMH of dementia, HTN, HLD, Hypothyroidism, MDD, constipation, Breast Cancer (s/p lymph node removal and radiation ~30 years ago), Takayasu Arteritis, admitted for fall and concern for worsening dementia    Debility.   - Pt with fall at home, likely mechanical in nature in setting of debility/worsening functional status 2/2 dementia; CK elevated to 331, likely from fall  - Mental status hallucinating; CT head and C-spine negative for acute pathology, C-collar cleared in ED  - XR of R shoulder (bruise noted to shoulder) no fractures, dislocations, or AC separation. Evidence of bony spurring in shoulder w/ generalized osteopenia otherwise no discrete lytic or blastic lesions.  - XR of R Hip w/ generalized osteopenia otherwise no discrete lytic or blastic lesions.    - EKG showed ST depression in lateral leads I and aVL, QTc 420 ms, hST 28  - TTE w/ hyperdynamic left ventricle and NL RV size and function; Orthostatics negative  - Neuro checks q4 hours; Fall precautions, seizure precautions, bed alarm.  - CT R shoulder: 1.  No acute fracture or dislocation. 2.  Severe osteoarthritis of the right glenohumeral joint  Leukocytosis w/ diarrhea; GI PCR - negative     Hypoxia - Noted w/ low grade 99.5 temp 12/26 and hypoxia; Urine/Blood Cx NGTD; On 3L NC; repeat COVID negative 12/27; Now on RA    Dementia concern for worsening dementia with behavioral disturbances based on collateral provided by pt's son.  - CT head negative for acute pathology  - According to ISTOP (Reference #: [id number]), pt has been regularly prescribed Xanax 0.5 mg QID PRN by her PCP and was last dispensed on 11/30/21. Concern for BZD withdrawal resumed low dose Xanax 0.25mg BID  - In setting of fall risk with eldery and BZD use will taper off completely then can use small dose Xanax 0.25mg PO TID PRN   - DC Zyprexa 1.25mg IM q6h PRN for agitation, QTc 420 ms on admission EKG, consider a sitter as trying to get out of bed , high risk of falling, hallucinations    Hypothyroidism continued on Synthroid 50mch PO QD; TSH elevated w/ normal T4    Major depression has made suicidal threats to her son over the phone in the past, denies SI/HI now; Mirtazapine 15mg PO qHS resumed; Sertraline initially on hold 2/2 NPO status then resumed at lower dosage    Breast cancer s/p lymph node removal and radiation 30 years ago.    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    Diarrhea developed after antibiotic treatment specimen not sent but po vanco initiated for 10 day course      Dispo - Rehab as discussed w/ Dr. Roy 89F PMH of dementia, HTN, HLD, Hypothyroidism, MDD, constipation, Breast Cancer (s/p lymph node removal and radiation ~30 years ago), Takayasu Arteritis, admitted for fall and concern for worsening dementia    Debility.   - Pt with fall at home, likely mechanical in nature in setting of debility/worsening functional status 2/2 dementia; CK elevated to 331, likely from fall  - Mental status hallucinating; CT head and C-spine negative for acute pathology, C-collar cleared in ED  - XR of R shoulder (bruise noted to shoulder) no fractures, dislocations, or AC separation. Evidence of bony spurring in shoulder w/ generalized osteopenia otherwise no discrete lytic or blastic lesions.  - XR of R Hip w/ generalized osteopenia otherwise no discrete lytic or blastic lesions.    - EKG showed ST depression in lateral leads I and aVL, QTc 420 ms, hST 28  - TTE w/ hyperdynamic left ventricle and NL RV size and function; Orthostatics negative  - Neuro checks q4 hours; Fall precautions, seizure precautions, bed alarm.  - CT R shoulder: 1.  No acute fracture or dislocation. 2.  Severe osteoarthritis of the right glenohumeral joint  Leukocytosis w/ diarrhea; GI PCR - negative     Hypoxia - Noted w/ low grade 99.5 temp 12/26 and hypoxia; Urine/Blood Cx NGTD; On 3L NC; repeat COVID negative 12/27; Now on RA    Dementia concern for worsening dementia with behavioral disturbances based on collateral provided by pt's son.  - CT head negative for acute pathology  - According to ISTOP (Reference #: [id number]), pt has been regularly prescribed Xanax 0.5 mg QID PRN by her PCP and was last dispensed on 11/30/21. Concern for BZD withdrawal resumed low dose Xanax 0.25mg BID  - In setting of fall risk with eldery and BZD use will taper off completely then can use small dose Xanax 0.25mg PO TID PRN   - DC Zyprexa 1.25mg IM q6h PRN for agitation, QTc 420 ms on admission EKG, consider a sitter as trying to get out of bed , high risk of falling, hallucinations    Hypothyroidism continued on Synthroid 50mch PO QD; TSH elevated w/ normal T4    Major depression has made suicidal threats to her son over the phone in the past, denies SI/HI now; Mirtazapine 15mg PO qHS resumed; Sertraline initially on hold 2/2 NPO status then resumed at lower dosage    Breast cancer s/p lymph node removal and radiation 30 years ago.    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    Diarrhea developed after antibiotic treatment specimen not sent but po vanco initiated for 10 day course  Diarrhea improving -none x 24 hours and WBC normalized     Dispo - Rehab as discussed w/ Dr. Roy  Pt discussed with Dr. Valdez and cleared for discharge 1/17/22  Dispo: Rehab

## 2022-01-11 NOTE — PROGRESS NOTE ADULT - PROBLEM SELECTOR PLAN 3
Concern for worsening dementia with behavioral disturbances based on collateral provided by pt's son.  Robert body Dementia ?  - CT head negative for acute pathology  - According to ISTOP (Reference #: [id number]), pt has been regularly prescribed Xanax 0.5 mg qid PRN by her PCP and was last dispensed on 11/30/21. Per patient she has been taking 2 tabs in AM and 2 tabs in PM for years  - Frequent re-orientation  - Ensure proper sleep-wake cycle  - xanax tapered off as recommended by psych but had been on for years, concerned for any component of BZD withdrawal... - d/w psych c/w xanax PRN anxiety , added Haldol PRN for paranoia  - d/w psych resumed standing BZDs as concerned re BZD withdrawal - Xanax 0.25 bid, give Ativan 0.5 if she doesn't take the Xanax  -confused and encephalopathic, given a dose of iv thiamine, c/w po thiamine

## 2022-01-12 LAB
ANION GAP SERPL CALC-SCNC: 14 MMOL/L — SIGNIFICANT CHANGE UP (ref 7–14)
BUN SERPL-MCNC: 27 MG/DL — HIGH (ref 7–23)
CALCIUM SERPL-MCNC: 9.6 MG/DL — SIGNIFICANT CHANGE UP (ref 8.4–10.5)
CHLORIDE SERPL-SCNC: 99 MMOL/L — SIGNIFICANT CHANGE UP (ref 98–107)
CO2 SERPL-SCNC: 25 MMOL/L — SIGNIFICANT CHANGE UP (ref 22–31)
CREAT SERPL-MCNC: 0.78 MG/DL — SIGNIFICANT CHANGE UP (ref 0.5–1.3)
GLUCOSE SERPL-MCNC: 89 MG/DL — SIGNIFICANT CHANGE UP (ref 70–99)
HCT VFR BLD CALC: 37.8 % — SIGNIFICANT CHANGE UP (ref 34.5–45)
HGB BLD-MCNC: 11.8 G/DL — SIGNIFICANT CHANGE UP (ref 11.5–15.5)
MAGNESIUM SERPL-MCNC: 2.3 MG/DL — SIGNIFICANT CHANGE UP (ref 1.6–2.6)
MCHC RBC-ENTMCNC: 28.9 PG — SIGNIFICANT CHANGE UP (ref 27–34)
MCHC RBC-ENTMCNC: 31.2 GM/DL — LOW (ref 32–36)
MCV RBC AUTO: 92.6 FL — SIGNIFICANT CHANGE UP (ref 80–100)
NRBC # BLD: 0 /100 WBCS — SIGNIFICANT CHANGE UP
NRBC # FLD: 0 K/UL — SIGNIFICANT CHANGE UP
PHOSPHATE SERPL-MCNC: 3.3 MG/DL — SIGNIFICANT CHANGE UP (ref 2.5–4.5)
PLATELET # BLD AUTO: 366 K/UL — SIGNIFICANT CHANGE UP (ref 150–400)
POTASSIUM SERPL-MCNC: 4 MMOL/L — SIGNIFICANT CHANGE UP (ref 3.5–5.3)
POTASSIUM SERPL-SCNC: 4 MMOL/L — SIGNIFICANT CHANGE UP (ref 3.5–5.3)
RBC # BLD: 4.08 M/UL — SIGNIFICANT CHANGE UP (ref 3.8–5.2)
RBC # FLD: 13.2 % — SIGNIFICANT CHANGE UP (ref 10.3–14.5)
SODIUM SERPL-SCNC: 138 MMOL/L — SIGNIFICANT CHANGE UP (ref 135–145)
WBC # BLD: 9 K/UL — SIGNIFICANT CHANGE UP (ref 3.8–10.5)
WBC # FLD AUTO: 9 K/UL — SIGNIFICANT CHANGE UP (ref 3.8–10.5)

## 2022-01-12 PROCEDURE — 99233 SBSQ HOSP IP/OBS HIGH 50: CPT

## 2022-01-12 RX ORDER — VANCOMYCIN HCL 1 G
125 VIAL (EA) INTRAVENOUS EVERY 6 HOURS
Refills: 0 | Status: DISCONTINUED | OUTPATIENT
Start: 2022-01-12 | End: 2022-01-17

## 2022-01-12 RX ADMIN — Medication 1 TABLET(S): at 13:18

## 2022-01-12 RX ADMIN — Medication 0.25 MILLIGRAM(S): at 08:50

## 2022-01-12 RX ADMIN — MIRTAZAPINE 15 MILLIGRAM(S): 45 TABLET, ORALLY DISINTEGRATING ORAL at 21:09

## 2022-01-12 RX ADMIN — SERTRALINE 100 MILLIGRAM(S): 25 TABLET, FILM COATED ORAL at 11:36

## 2022-01-12 RX ADMIN — ENOXAPARIN SODIUM 40 MILLIGRAM(S): 100 INJECTION SUBCUTANEOUS at 11:37

## 2022-01-12 RX ADMIN — Medication 1 TABLET(S): at 05:20

## 2022-01-12 RX ADMIN — ATORVASTATIN CALCIUM 20 MILLIGRAM(S): 80 TABLET, FILM COATED ORAL at 21:09

## 2022-01-12 RX ADMIN — Medication 1 TABLET(S): at 21:09

## 2022-01-12 RX ADMIN — Medication 125 MILLIGRAM(S): at 22:16

## 2022-01-12 RX ADMIN — Medication 125 MILLIGRAM(S): at 17:26

## 2022-01-12 RX ADMIN — Medication 0.25 MILLIGRAM(S): at 17:27

## 2022-01-12 RX ADMIN — Medication 100 MILLIGRAM(S): at 11:36

## 2022-01-12 RX ADMIN — Medication 50 MICROGRAM(S): at 05:20

## 2022-01-12 NOTE — PROGRESS NOTE ADULT - PROBLEM SELECTOR PLAN 12
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  - Not on blood thinners per son, no ASA or Plavix  - Monitor for changes in upper extremities    C/w LMWH for DVT PPx  D/w ACP and during IDR's  Son has been updated with the plan today-1/12  D/c on hold for now as above, f/up C.Diff PCR

## 2022-01-12 NOTE — PROVIDER CONTACT NOTE (OTHER) - REASON
Patient complaining of left shoulder pain
Patient refusing neuro assessment
[facility] not [facility] patient

## 2022-01-12 NOTE — PROGRESS NOTE ADULT - PROBLEM SELECTOR PLAN 4
acute pain of right shoulder  no swelling, but ROM is limited due to pain.   Xray neg for fx or dislocation, pt refused repeat xray Advanced AC and glenohumeral joint osteoarthrosis with joint surface remodeling changes. Thick superiorly projecting acromial articular margin  osteophyte  CT right shoulder (1/8) no acute fx/dislocation, Severe osteoarthritis of the right glenohumeral joint  Orthopedic consult rec outpatient f/up  Unable to participate with PT due to pain at this time.   tylenol prn for pain.

## 2022-01-12 NOTE — PROGRESS NOTE ADULT - PROBLEM SELECTOR PLAN 1
Having diarrhea again today, possible C.Diff Infection  F/up repeat C.diff PCR  Contact isolation  Started on Vanco PO-d/c if negative PCR  DC all bowel regimen at this time  C/w probiotics for now  Dispo: PT rec [facility] , covid swab (last neg on 1/9), d/c on hold at this time due to diarrhea as above

## 2022-01-12 NOTE — PROVIDER CONTACT NOTE (OTHER) - ACTION/TREATMENT ORDERED:
MD notified and states no interventions needed at this time. Nursing care continued
ACP made aware. Nursing care to continue
MD notified and states ok to give tylenol and will order and X-ray. Nursing care continued

## 2022-01-12 NOTE — PROGRESS NOTE ADULT - PROBLEM SELECTOR PLAN 10
Pt has chronic constipation.  - Monitor for BM with stool count  --->>>now with diarrhea 1/12 so holding laxatives at this time, f/up C.Diff PCR as above

## 2022-01-12 NOTE — PROVIDER CONTACT NOTE (OTHER) - BACKGROUND
Patient diagnosed with altered mental status
Patient admitted for disorientation. PMH; hypothyroid, constipation, major depression
Patient admitted for disorientation. PMH; hypothyroid, constipation, major depression

## 2022-01-12 NOTE — PROGRESS NOTE ADULT - SUBJECTIVE AND OBJECTIVE BOX
Patient is a 89y old  Female who presents with a chief complaint of Fall, dementia (11 Jan 2022 17:48)      INTERVAL HPI/OVERNIGHT EVENTS:  Patient did not leave yesterday as transport came at 3AM and [facility] will not accept pt at that time.  Seen by me this morning. +Loose BM this AM, foul smelling resembling C.diff per both RN and ACP. Otherwise doing well, wants to go home.    Review of Systems: 12 point review of systems otherwise negative    MEDICATIONS  (STANDING):  ALPRAZolam 0.25 milliGRAM(s) Oral <User Schedule>  atorvastatin 20 milliGRAM(s) Oral at bedtime  enoxaparin Injectable 40 milliGRAM(s) SubCutaneous daily  lactobacillus acidophilus 1 Tablet(s) Oral three times a day  levothyroxine 50 MICROGram(s) Oral daily  mirtazapine 15 milliGRAM(s) Oral at bedtime  sertraline 100 milliGRAM(s) Oral daily  thiamine 100 milliGRAM(s) Oral daily  vancomycin    Solution 125 milliGRAM(s) Oral every 6 hours    MEDICATIONS  (PRN):  acetaminophen     Tablet .. 650 milliGRAM(s) Oral every 6 hours PRN Temp greater or equal to 38C (100.4F), Moderate Pain (4 - 6)  artificial  tears Solution 1 Drop(s) Both EYES daily PRN Dry Eyes      Allergies    No Known Allergies    Intolerances          Vital Signs Last 24 Hrs  T(C): 36.7 (12 Jan 2022 13:30), Max: 36.8 (11 Jan 2022 18:48)  T(F): 98 (12 Jan 2022 13:30), Max: 98.2 (11 Jan 2022 18:48)  HR: 70 (12 Jan 2022 13:30) (68 - 89)  BP: 126/60 (12 Jan 2022 13:30) (113/60 - 157/59)  BP(mean): --  RR: 18 (12 Jan 2022 13:30) (18 - 18)  SpO2: 98% (12 Jan 2022 13:30) (98% - 99%)  CAPILLARY BLOOD GLUCOSE          01-11 @ 07:01  -  01-12 @ 07:00  --------------------------------------------------------  IN: 100 mL / OUT: 0 mL / NET: 100 mL    01-12 @ 07:01  -  01-12 @ 18:09  --------------------------------------------------------  IN: 237 mL / OUT: 0 mL / NET: 237 mL        Physical Exam:    Daily     Daily   General:  Well appearing, NAD, cachetic  HEENT:  Nonicteric, PERRLA  CV:  RRR, no murmur, no JVD  Lungs:  CTA B/L, no wheezes, rales, rhonchi  Abdomen:  Soft, non-tender, no distended, positive BS  Extremities:  2+ pulses, no c/c, no edema  Skin:  Warm and dry, no rashes  :  No mckeon  Neuro:  Alert, non-focal, CN II-XII grossly intact  No Restraints    LABS:                        11.8   9.00  )-----------( 366      ( 12 Jan 2022 06:27 )             37.8     01-12    138  |  99  |  27<H>  ----------------------------<  89  4.0   |  25  |  0.78    Ca    9.6      12 Jan 2022 06:27  Phos  3.3     01-12  Mg     2.30     01-12              RADIOLOGY & ADDITIONAL TESTS:  Reviewed by me

## 2022-01-12 NOTE — PROVIDER CONTACT NOTE (OTHER) - ASSESSMENT
No acute distress noted
Patient ready for discharge but [facility] not take [facility]. The Nursing home said they will not accept the patient until after 7am per senior care.
Pain of left shoulder 6/10

## 2022-01-12 NOTE — PROVIDER CONTACT NOTE (OTHER) - SITUATION
Patient is refusing neuro assessment.
Patient complaining of left shoulder pain
Patient was not taken by senior [facility]

## 2022-01-12 NOTE — PROGRESS NOTE ADULT - PROBLEM SELECTOR PLAN 5
Pt with fall at home, likely mechanical in nature in setting of debility/worsening functional status 2/2 dementia  - CK elevated to 331, likely from fall  - Mental status - AAO to self and place. Agitated at times.   - CT head and C-spine negative for acute pathology, C-collar cleared in ED  - EKG showed ST depression in lateral leads I and aVL, QTc 420 ms, hST 28-->34, chest pain free  - normal orthostatics  - PT consult - [facility] (last seen 1/11)  - Fall precautions, seizure precautions, bed alarm

## 2022-01-12 NOTE — PROGRESS NOTE ADULT - PROBLEM SELECTOR PLAN 3
Concern for worsening dementia with behavioral disturbances based on collateral provided by pt's son.  Robert body Dementia ?  - CT head negative for acute pathology  - According to ISTOP (Reference #: [id number]), pt has been regularly prescribed Xanax 0.5 mg qid PRN by her PCP and was last dispensed on 11/30/21. Per patient she has been taking 2 tabs in AM and 2 tabs in PM for years  - Frequent re-orientation  - Ensure proper sleep-wake cycle  - xanax tapered off as recommended by psych but had been on for years, concerned for any component of BZD withdrawal... - d/w psych c/w xanax PRN anxiety , added Haldol PRN for paranoia  - d/w psych resumed standing BZDs as concerned re BZD withdrawal - Xanax 0.25 bid, give Ativan 0.5 if she doesn't take the Xanax  -confused and encephalopathic, given a dose of iv thiamine, c/w po thiamine

## 2022-01-13 LAB
ANION GAP SERPL CALC-SCNC: 13 MMOL/L — SIGNIFICANT CHANGE UP (ref 7–14)
BUN SERPL-MCNC: 24 MG/DL — HIGH (ref 7–23)
CALCIUM SERPL-MCNC: 9.3 MG/DL — SIGNIFICANT CHANGE UP (ref 8.4–10.5)
CHLORIDE SERPL-SCNC: 102 MMOL/L — SIGNIFICANT CHANGE UP (ref 98–107)
CO2 SERPL-SCNC: 24 MMOL/L — SIGNIFICANT CHANGE UP (ref 22–31)
CREAT SERPL-MCNC: 0.71 MG/DL — SIGNIFICANT CHANGE UP (ref 0.5–1.3)
GLUCOSE SERPL-MCNC: 104 MG/DL — HIGH (ref 70–99)
HCT VFR BLD CALC: 39.6 % — SIGNIFICANT CHANGE UP (ref 34.5–45)
HGB BLD-MCNC: 12.1 G/DL — SIGNIFICANT CHANGE UP (ref 11.5–15.5)
MAGNESIUM SERPL-MCNC: 2.1 MG/DL — SIGNIFICANT CHANGE UP (ref 1.6–2.6)
MCHC RBC-ENTMCNC: 28.9 PG — SIGNIFICANT CHANGE UP (ref 27–34)
MCHC RBC-ENTMCNC: 30.6 GM/DL — LOW (ref 32–36)
MCV RBC AUTO: 94.7 FL — SIGNIFICANT CHANGE UP (ref 80–100)
NRBC # BLD: 0 /100 WBCS — SIGNIFICANT CHANGE UP
NRBC # FLD: 0 K/UL — SIGNIFICANT CHANGE UP
PHOSPHATE SERPL-MCNC: 3.4 MG/DL — SIGNIFICANT CHANGE UP (ref 2.5–4.5)
PLATELET # BLD AUTO: 312 K/UL — SIGNIFICANT CHANGE UP (ref 150–400)
POTASSIUM SERPL-MCNC: 4.6 MMOL/L — SIGNIFICANT CHANGE UP (ref 3.5–5.3)
POTASSIUM SERPL-SCNC: 4.6 MMOL/L — SIGNIFICANT CHANGE UP (ref 3.5–5.3)
RBC # BLD: 4.18 M/UL — SIGNIFICANT CHANGE UP (ref 3.8–5.2)
RBC # FLD: 13.2 % — SIGNIFICANT CHANGE UP (ref 10.3–14.5)
SODIUM SERPL-SCNC: 139 MMOL/L — SIGNIFICANT CHANGE UP (ref 135–145)
WBC # BLD: 7.35 K/UL — SIGNIFICANT CHANGE UP (ref 3.8–10.5)
WBC # FLD AUTO: 7.35 K/UL — SIGNIFICANT CHANGE UP (ref 3.8–10.5)

## 2022-01-13 PROCEDURE — 99233 SBSQ HOSP IP/OBS HIGH 50: CPT

## 2022-01-13 RX ORDER — ALPRAZOLAM 0.25 MG
0.25 TABLET ORAL
Refills: 0 | Status: DISCONTINUED | OUTPATIENT
Start: 2022-01-13 | End: 2022-01-17

## 2022-01-13 RX ADMIN — MIRTAZAPINE 15 MILLIGRAM(S): 45 TABLET, ORALLY DISINTEGRATING ORAL at 21:48

## 2022-01-13 RX ADMIN — Medication 125 MILLIGRAM(S): at 10:56

## 2022-01-13 RX ADMIN — Medication 1 TABLET(S): at 05:21

## 2022-01-13 RX ADMIN — Medication 125 MILLIGRAM(S): at 22:01

## 2022-01-13 RX ADMIN — Medication 125 MILLIGRAM(S): at 05:23

## 2022-01-13 RX ADMIN — Medication 50 MICROGRAM(S): at 05:21

## 2022-01-13 RX ADMIN — ATORVASTATIN CALCIUM 20 MILLIGRAM(S): 80 TABLET, FILM COATED ORAL at 21:48

## 2022-01-13 RX ADMIN — SERTRALINE 100 MILLIGRAM(S): 25 TABLET, FILM COATED ORAL at 10:57

## 2022-01-13 RX ADMIN — Medication 1 TABLET(S): at 21:48

## 2022-01-13 RX ADMIN — Medication 100 MILLIGRAM(S): at 10:57

## 2022-01-13 RX ADMIN — ENOXAPARIN SODIUM 40 MILLIGRAM(S): 100 INJECTION SUBCUTANEOUS at 10:57

## 2022-01-13 NOTE — PROGRESS NOTE ADULT - ASSESSMENT
89 y/o F, PMH of dementia, HTN, HLD, Hypothyroidism, MDD, Constipation, Breast Cancer (s/p lymph node removal and radiation ~30 years ago), Takayasu Arteritis, admitted for fall and concern for worsening dementia vs ativan induced. now improved and off Ativan taper.   Very paranoid, has been tapered off benzos, last dose 12/30 - resumed BZD bec concerned about withdrawal  1/5/21 diarrhea, completed course of Abx for UTI.  Diarrhea reoccurred, high suspicion for C.diff so being treated for it.

## 2022-01-13 NOTE — PROGRESS NOTE ADULT - PROBLEM SELECTOR PLAN 1
Having diarrhea again since 1/12, awaiting for sample to be collected  F/up repeat C.diff PCR  Contact isolation  C/w vanco PO for now given high suspicion Day 1/10  Off bowel regimen  C/w probiotics for now  Dispo: PT rec [facility] , covid swab (last neg on 1/9, reordered for tomorrow 1/14), d/c on hold at this time due to suspected C.diff as above

## 2022-01-13 NOTE — PROGRESS NOTE ADULT - SUBJECTIVE AND OBJECTIVE BOX
Patient is a 89y old  Female who presents with a chief complaint of Fall, dementia (12 Jan 2022 18:09)      INTERVAL HPI/OVERNIGHT EVENTS:  Seen by me this afternoon, had 2 loose BM's overnight, screaming on/off that she wants to go back home. Appetite is ok. Drinking water at time of visit.    Review of Systems: 12 point review of systems otherwise negative    MEDICATIONS  (STANDING):  ALPRAZolam 0.25 milliGRAM(s) Oral <User Schedule>  atorvastatin 20 milliGRAM(s) Oral at bedtime  enoxaparin Injectable 40 milliGRAM(s) SubCutaneous daily  lactobacillus acidophilus 1 Tablet(s) Oral three times a day  levothyroxine 50 MICROGram(s) Oral daily  mirtazapine 15 milliGRAM(s) Oral at bedtime  sertraline 100 milliGRAM(s) Oral daily  thiamine 100 milliGRAM(s) Oral daily  vancomycin    Solution 125 milliGRAM(s) Oral every 6 hours    MEDICATIONS  (PRN):  acetaminophen     Tablet .. 650 milliGRAM(s) Oral every 6 hours PRN Temp greater or equal to 38C (100.4F), Moderate Pain (4 - 6)  artificial  tears Solution 1 Drop(s) Both EYES daily PRN Dry Eyes      Allergies    No Known Allergies    Intolerances          Vital Signs Last 24 Hrs  T(C): 36.4 (13 Jan 2022 10:41), Max: 36.7 (12 Jan 2022 20:31)  T(F): 97.6 (13 Jan 2022 10:41), Max: 98 (12 Jan 2022 20:31)  HR: 70 (13 Jan 2022 10:41) (59 - 70)  BP: 110/60 (13 Jan 2022 10:41) (110/60 - 134/62)  BP(mean): --  RR: 17 (13 Jan 2022 10:41) (16 - 18)  SpO2: 98% (13 Jan 2022 10:41) (97% - 98%)  CAPILLARY BLOOD GLUCOSE          01-12 @ 07:01  -  01-13 @ 07:00  --------------------------------------------------------  IN: 287 mL / OUT: 0 mL / NET: 287 mL        Physical Exam:    Daily     Daily   General:  Well appearing, NAD, cachetic  HEENT:  Nonicteric, PERRLA  CV:  RRR, no murmur, no JVD  Lungs:  CTA B/L, no wheezes, rales, rhonchi  Abdomen:  Soft, non-tender, no distended, positive BS  Extremities:  2+ pulses, no c/c, no edema  Skin:  Warm and dry, no rashes  :  No mckeon  Neuro:  Alert, non-focal, CN II-XII grossly intact  No Restraints    LABS:                        12.1   7.35  )-----------( 312      ( 13 Jan 2022 06:24 )             39.6     01-13    139  |  102  |  24<H>  ----------------------------<  104<H>  4.6   |  24  |  0.71    Ca    9.3      13 Jan 2022 06:24  Phos  3.4     01-13  Mg     2.10     01-13              RADIOLOGY & ADDITIONAL TESTS:  Reviewed by me

## 2022-01-13 NOTE — PROGRESS NOTE ADULT - PROBLEM SELECTOR PLAN 10
Pt has chronic constipation.  - Monitor for BM with stool count  --->>>now with diarrhea since 1/12 so holding laxatives at this time, f/up C.Diff PCR as above

## 2022-01-13 NOTE — PROGRESS NOTE ADULT - PROBLEM SELECTOR PLAN 3
Concern for worsening dementia with behavioral disturbances based on collateral provided by pt's son.  Robert body Dementia ?  - CT head negative for acute pathology  - According to ISTOP (Reference #: [id number]), pt has been regularly prescribed Xanax 0.5 mg qid PRN by her PCP and was last dispensed on 11/30/21. Per patient she has been taking 2 tabs in AM and 2 tabs in PM for years  - Frequent re-orientation  - Ensure proper sleep-wake cycle  - xanax tapered off as recommended by psych but had been on for years, concerned for any component of BZD withdrawal... - d/w psych c/w xanax PRN anxiety , added Haldol PRN for paranoia  - d/w psych resumed standing BZDs as concerned re BZD withdrawal - Xanax 0.25 bid, give Ativan 0.5 if she doesn't take the Xanax  -confused and encephalopathic, given a dose of iv thiamine, c/w po thiamine

## 2022-01-13 NOTE — PROGRESS NOTE ADULT - PROBLEM SELECTOR PLAN 5
Pt with fall at home, likely mechanical in nature in setting of debility/worsening functional status 2/2 dementia  - CK elevated to 331, likely from fall  - Mental status - AAO to self and place. Agitated at times.   - CT head and C-spine negative for acute pathology, C-collar cleared in ED  - EKG showed ST depression in lateral leads I and aVL, QTc 420 ms, hST 28-->34, chest pain free  - normal orthostatics  - PT consult - [facility] (last seen 1/11)  - Fall precautions, seizure precautions, bed alarm

## 2022-01-13 NOTE — PROGRESS NOTE ADULT - PROBLEM SELECTOR PLAN 12
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  - Not on blood thinners per son, no ASA or Plavix  - Monitor for changes in upper extremities    C/w LMWH for DVT PPx  D/w ACP and during IDR's  Son was updated with the plan on 1/12  D/c on hold for now as above, f/up C.Diff PCR

## 2022-01-14 LAB — SARS-COV-2 RNA SPEC QL NAA+PROBE: SIGNIFICANT CHANGE UP

## 2022-01-14 PROCEDURE — 99233 SBSQ HOSP IP/OBS HIGH 50: CPT

## 2022-01-14 RX ADMIN — Medication 1 TABLET(S): at 22:47

## 2022-01-14 RX ADMIN — MIRTAZAPINE 15 MILLIGRAM(S): 45 TABLET, ORALLY DISINTEGRATING ORAL at 22:47

## 2022-01-14 RX ADMIN — ATORVASTATIN CALCIUM 20 MILLIGRAM(S): 80 TABLET, FILM COATED ORAL at 22:47

## 2022-01-14 RX ADMIN — ENOXAPARIN SODIUM 40 MILLIGRAM(S): 100 INJECTION SUBCUTANEOUS at 14:31

## 2022-01-14 RX ADMIN — Medication 125 MILLIGRAM(S): at 18:02

## 2022-01-14 RX ADMIN — Medication 125 MILLIGRAM(S): at 06:24

## 2022-01-14 RX ADMIN — Medication 50 MICROGRAM(S): at 06:23

## 2022-01-14 RX ADMIN — Medication 125 MILLIGRAM(S): at 14:30

## 2022-01-14 RX ADMIN — SERTRALINE 100 MILLIGRAM(S): 25 TABLET, FILM COATED ORAL at 14:32

## 2022-01-14 RX ADMIN — Medication 1 TABLET(S): at 06:23

## 2022-01-14 RX ADMIN — Medication 1 TABLET(S): at 14:32

## 2022-01-14 RX ADMIN — Medication 100 MILLIGRAM(S): at 14:31

## 2022-01-14 RX ADMIN — Medication 0.25 MILLIGRAM(S): at 14:30

## 2022-01-14 RX ADMIN — Medication 0.25 MILLIGRAM(S): at 22:47

## 2022-01-14 NOTE — PROGRESS NOTE ADULT - PROBLEM SELECTOR PLAN 10
Pt has chronic constipation.  - Monitor for BM with stool count  --->>>now with diarrhea since 1/12 so holding laxatives at this time, treating for suspected CDI as above

## 2022-01-14 NOTE — PROGRESS NOTE ADULT - PROBLEM SELECTOR PLAN 1
Having diarrhea again since 1/12, sample for C.diff not collected  Contact isolation  C/w vanco PO for now given high suspicion for C.Diff Infection-Day 2/10  Clinically improving as less frequent BM's  Off bowel regimen  C/w probiotics for now  Dispo: PT rec [facility] , covid swab (last neg on 1/14), awaiting isolation bed at [facility]-possibly on Monday 1/17

## 2022-01-14 NOTE — PROGRESS NOTE ADULT - PROBLEM SELECTOR PLAN 12
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  - Not on blood thinners per son, no ASA or Plavix  - Monitor for changes in upper extremities    C/w LMWH for DVT PPx  D/w ACP and during IDR's  Son was updated with the plan today 1/14  Awaiting isolation bed available at [facility] [place], likely to happen on Monday 1/17  Repeat COVID swab on Sunday-1/16 (last neg on 1/14)

## 2022-01-14 NOTE — PROGRESS NOTE ADULT - PROBLEM SELECTOR PLAN 5
Pt with fall at home, likely mechanical in nature in setting of debility/worsening functional status 2/2 dementia  - CK elevated to 331, likely from fall  - Mental status - AAO to self and place. Agitated at times.   - CT head and C-spine negative for acute pathology, C-collar cleared in ED  - EKG showed ST depression in lateral leads I and aVL, QTc 420 ms, hST 28-->34, chest pain free  - normal orthostatics  - PT consult - [facility] (last seen 1/11)  - Fall precautions, seizure precautions, bed alarm

## 2022-01-14 NOTE — PROGRESS NOTE ADULT - PROBLEM SELECTOR PLAN 3
Concern for worsening dementia with behavioral disturbances based on collateral provided by pt's son.  Robert body Dementia ?  - CT head negative for acute pathology  - According to ISTOP (Reference #: [id number]), pt has been regularly prescribed Xanax 0.5 mg qid PRN by her PCP and was last dispensed on 11/30/21. Per patient she has been taking 2 tabs in AM and 2 tabs in PM for years  - Frequent re-orientation  - Ensure proper sleep-wake cycle  - xanax tapered off as recommended by psych but had been on for years, concerned for any component of BZD withdrawal... - d/w psych c/w xanax PRN anxiety , added Haldol PRN for paranoia  - d/w psych resumed standing BZDs as concerned re BZD withdrawal - c/w Xanax 0.25 bid, give Ativan 0.5 if she doesn't take the Xanax  -confused and encephalopathic, given a dose of iv thiamine, c/w po thiamine

## 2022-01-14 NOTE — PROGRESS NOTE ADULT - SUBJECTIVE AND OBJECTIVE BOX
Patient is a 89y old  Female who presents with a chief complaint of Fall, dementia (13 Jan 2022 16:32)      INTERVAL HPI/OVERNIGHT EVENTS:  Seen by me this morning, wants to go home ASAP. Tolerating PO. Loose BM x 1 overnight. No other complaints.    Review of Systems: 12 point review of systems otherwise negative    MEDICATIONS  (STANDING):  ALPRAZolam 0.25 milliGRAM(s) Oral <User Schedule>  atorvastatin 20 milliGRAM(s) Oral at bedtime  enoxaparin Injectable 40 milliGRAM(s) SubCutaneous daily  lactobacillus acidophilus 1 Tablet(s) Oral three times a day  levothyroxine 50 MICROGram(s) Oral daily  mirtazapine 15 milliGRAM(s) Oral at bedtime  sertraline 100 milliGRAM(s) Oral daily  thiamine 100 milliGRAM(s) Oral daily  vancomycin    Solution 125 milliGRAM(s) Oral every 6 hours    MEDICATIONS  (PRN):  acetaminophen     Tablet .. 650 milliGRAM(s) Oral every 6 hours PRN Temp greater or equal to 38C (100.4F), Moderate Pain (4 - 6)  artificial  tears Solution 1 Drop(s) Both EYES daily PRN Dry Eyes      Allergies    No Known Allergies    Intolerances          Vital Signs Last 24 Hrs  T(C): 36.6 (14 Jan 2022 06:20), Max: 36.6 (14 Jan 2022 06:20)  T(F): 97.9 (14 Jan 2022 06:20), Max: 97.9 (14 Jan 2022 06:20)  HR: 72 (14 Jan 2022 06:20) (66 - 72)  BP: 118/62 (14 Jan 2022 06:20) (118/62 - 123/60)  BP(mean): --  RR: 18 (14 Jan 2022 06:20) (16 - 18)  SpO2: 98% (14 Jan 2022 06:20) (96% - 98%)  CAPILLARY BLOOD GLUCOSE            Physical Exam:    Daily     Daily   General:  Well appearing, NAD, cachetic  HEENT:  Nonicteric, PERRLA  CV:  RRR, no murmur, no JVD  Lungs:  CTA B/L, no wheezes, rales, rhonchi  Abdomen:  Soft, non-tender, no distended, positive BS  Extremities:  2+ pulses, no c/c, no edema  Skin:  Warm and dry, no rashes  :  No mckeon  Neuro:  Alert, non-focal, CN II-XII grossly intact  No Restraints    LABS:                        12.1   7.35  )-----------( 312      ( 13 Jan 2022 06:24 )             39.6     01-13    139  |  102  |  24<H>  ----------------------------<  104<H>  4.6   |  24  |  0.71    Ca    9.3      13 Jan 2022 06:24  Phos  3.4     01-13  Mg     2.10     01-13              RADIOLOGY & ADDITIONAL TESTS:  Reviewed by me

## 2022-01-15 LAB
ANION GAP SERPL CALC-SCNC: 9 MMOL/L — SIGNIFICANT CHANGE UP (ref 7–14)
BUN SERPL-MCNC: 17 MG/DL — SIGNIFICANT CHANGE UP (ref 7–23)
CALCIUM SERPL-MCNC: 9.2 MG/DL — SIGNIFICANT CHANGE UP (ref 8.4–10.5)
CHLORIDE SERPL-SCNC: 102 MMOL/L — SIGNIFICANT CHANGE UP (ref 98–107)
CO2 SERPL-SCNC: 30 MMOL/L — SIGNIFICANT CHANGE UP (ref 22–31)
CREAT SERPL-MCNC: 0.59 MG/DL — SIGNIFICANT CHANGE UP (ref 0.5–1.3)
GLUCOSE SERPL-MCNC: 106 MG/DL — HIGH (ref 70–99)
MAGNESIUM SERPL-MCNC: 2.2 MG/DL — SIGNIFICANT CHANGE UP (ref 1.6–2.6)
PHOSPHATE SERPL-MCNC: 3.2 MG/DL — SIGNIFICANT CHANGE UP (ref 2.5–4.5)
POTASSIUM SERPL-MCNC: 4 MMOL/L — SIGNIFICANT CHANGE UP (ref 3.5–5.3)
POTASSIUM SERPL-SCNC: 4 MMOL/L — SIGNIFICANT CHANGE UP (ref 3.5–5.3)
SODIUM SERPL-SCNC: 141 MMOL/L — SIGNIFICANT CHANGE UP (ref 135–145)

## 2022-01-15 PROCEDURE — 99232 SBSQ HOSP IP/OBS MODERATE 35: CPT

## 2022-01-15 RX ADMIN — MIRTAZAPINE 15 MILLIGRAM(S): 45 TABLET, ORALLY DISINTEGRATING ORAL at 23:00

## 2022-01-15 RX ADMIN — Medication 100 MILLIGRAM(S): at 12:17

## 2022-01-15 RX ADMIN — Medication 0.25 MILLIGRAM(S): at 18:39

## 2022-01-15 RX ADMIN — Medication 125 MILLIGRAM(S): at 00:38

## 2022-01-15 RX ADMIN — Medication 1 TABLET(S): at 12:16

## 2022-01-15 RX ADMIN — Medication 1 TABLET(S): at 23:00

## 2022-01-15 RX ADMIN — Medication 50 MICROGRAM(S): at 05:34

## 2022-01-15 RX ADMIN — Medication 125 MILLIGRAM(S): at 05:40

## 2022-01-15 RX ADMIN — ATORVASTATIN CALCIUM 20 MILLIGRAM(S): 80 TABLET, FILM COATED ORAL at 23:00

## 2022-01-15 RX ADMIN — Medication 125 MILLIGRAM(S): at 18:38

## 2022-01-15 RX ADMIN — Medication 1 TABLET(S): at 05:34

## 2022-01-15 RX ADMIN — ENOXAPARIN SODIUM 40 MILLIGRAM(S): 100 INJECTION SUBCUTANEOUS at 12:16

## 2022-01-15 RX ADMIN — SERTRALINE 100 MILLIGRAM(S): 25 TABLET, FILM COATED ORAL at 12:16

## 2022-01-15 RX ADMIN — Medication 125 MILLIGRAM(S): at 12:15

## 2022-01-15 RX ADMIN — Medication 0.25 MILLIGRAM(S): at 12:16

## 2022-01-15 NOTE — PROGRESS NOTE ADULT - PROBLEM SELECTOR PLAN 1
Having diarrhea again since 1/12, sample for C.diff not collected  Contact isolation  C/w vanco PO for now given high suspicion for C.Diff Infection-Day 3/10  Clinically improving as less frequent BM's, 1 loose stool this AM  Off bowel regimen  C/w probiotics for now  Dispo: PT rec [facility] , covid swab (last neg on 1/14), awaiting isolation bed at [facility]-possibly on Monday 1/17

## 2022-01-15 NOTE — PROGRESS NOTE ADULT - PROBLEM SELECTOR PLAN 12
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  - Not on blood thinners per son, no ASA or Plavix  - Monitor for changes in upper extremities    C/w LMWH for DVT PPx  Awaiting isolation bed available at [facility], likely to happen on Monday 1/17  Repeat COVID swab on Sunday-1/16 (last neg on 1/14)

## 2022-01-15 NOTE — PROGRESS NOTE ADULT - PROBLEM SELECTOR PLAN 5
Pt with fall at home, likely mechanical in nature in setting of debility/worsening functional status 2/2 dementia  - CK elevated to 331, likely from fall  - Mental status - AAO to self and place. Agitated at times.   - CT head and C-spine negative for acute pathology, C-collar cleared in ED  - EKG showed ST depression in lateral leads I and aVL, QTc 420 ms, hST 28-->34, chest pain free  - normal orthostatics  - PT consult - [facility] (last seen 1/11)  - Fall precautions, seizure precautions, bed alarm

## 2022-01-15 NOTE — CHART NOTE - NSCHARTNOTESELECT_GEN_ALL_CORE
Event Note
Follow-up/Nutrition Services
Event Note
Event Note
Follow-up/Nutrition Services
ISTOP/Event Note

## 2022-01-15 NOTE — CHART NOTE - NSCHARTNOTEFT_GEN_A_CORE
Source:    other [x] PCA, Medical Chart   Diet rx: Soft and Bite Sized; DASH/TLC {Sodium & Cholesterol Restricted} (DASH) Supplement Feeding Modality: Oral Ensure Enlive Cans or Servings Per Day: 2 Frequency: Two Times a day     Pt 89 yo female with PMHx of dementia, HTN, HLD, Hypothyroidism, MDD, Constipation, Breast Cancer (s/p lymph node removal and radiation ~30 years ago), Takayasu Arteritis - per chart review.   At time of visit, Pt awake, appears agitated, disoriented. Per PCA, Pt remains with poor appetite; Pt only had Ensure for breakfast this morning; Pt needs assistance with meals.    +Diarrhea reoccurred since 1/12; rec to hold laxatives at this time, treating for suspected c.diff. infection, reported.     Pt's height: 63" (12/24)          IBW: 115#+/-10%           Pt's weight: 64.7 kg (12/24)   Pertinent Medications: Lovenox, Thiamin, Lactobacillus Acidophilus, Lipitor, Remeron,   Pertinent Labs: (1/15) Glu 106 H;  (12/24) Albumin 4.2  Skin: +ecchymosis/dryness     Estimated Needs: [x] no change since previous assessment  Previous Nutrition Diagnosis: [x] Malnutrition, severe    Nutrition Diagnosis is [x] ongoing     Nutrition Interventions/Recommendations:   1. Encourage & assist Pt with meals; Monitor PO diet tolerance;   2. Honor food preferences;  3. Continue Lactobacillus Acidophilus, Remeron, as ordered;   4. Monitor labs, weekly weights, hydration status;

## 2022-01-15 NOTE — PROGRESS NOTE ADULT - ASSESSMENT
89 y/o F, PMH of dementia, HTN, HLD, Hypothyroidism, MDD, Constipation, Breast Cancer (s/p lymph node removal and radiation ~30 years ago), Takayasu Arteritis, admitted for fall and concern for worsening dementia vs ativan induced. now improved and off Ativan taper.   Very paranoid, has been tapered off benzos, last dose 12/30 - resumed BZD bec concerned about withdrawal  1/5/21 diarrhea, completed course of Abx for UTI.  Diarrhea reoccurred, high suspicion for C.diff so being treated for it.

## 2022-01-15 NOTE — PROGRESS NOTE ADULT - SUBJECTIVE AND OBJECTIVE BOX
Patient is a 89y old  Female who presents with a chief complaint of Fall, dementia (14 Jan 2022 14:34)      SUBJECTIVE / OVERNIGHT EVENTS:    Patient seen and examined at bedside. Pt agitated yelling for people, wants to leave hospital, denies dyspnea/CP.     MEDICATIONS  (STANDING):  ALPRAZolam 0.25 milliGRAM(s) Oral <User Schedule>  atorvastatin 20 milliGRAM(s) Oral at bedtime  enoxaparin Injectable 40 milliGRAM(s) SubCutaneous daily  lactobacillus acidophilus 1 Tablet(s) Oral three times a day  levothyroxine 50 MICROGram(s) Oral daily  mirtazapine 15 milliGRAM(s) Oral at bedtime  sertraline 100 milliGRAM(s) Oral daily  thiamine 100 milliGRAM(s) Oral daily  vancomycin    Solution 125 milliGRAM(s) Oral every 6 hours    MEDICATIONS  (PRN):  acetaminophen     Tablet .. 650 milliGRAM(s) Oral every 6 hours PRN Temp greater or equal to 38C (100.4F), Moderate Pain (4 - 6)  artificial  tears Solution 1 Drop(s) Both EYES daily PRN Dry Eyes      Vital Signs Last 24 Hrs  T(C): 36.8 (15 Noble 2022 12:00), Max: 37.2 (14 Jan 2022 21:10)  T(F): 98.2 (15 Noble 2022 12:00), Max: 98.9 (14 Jan 2022 21:10)  HR: 62 (15 Noble 2022 12:00) (60 - 72)  BP: 120/70 (15 Noble 2022 12:00) (120/70 - 155/69)  BP(mean): --  RR: 18 (15 Noble 2022 12:00) (16 - 19)  SpO2: 97% (15 Noble 2022 12:00) (95% - 97%)  CAPILLARY BLOOD GLUCOSE        I&O's Summary      PHYSICAL EXAM:  Vital Signs Last 24 Hrs  T(C): 36.8 (01-15-22 @ 12:00)  T(F): 98.2 (01-15-22 @ 12:00), Max: 98.9 (01-14-22 @ 21:10)  HR: 62 (01-15-22 @ 12:00) (60 - 72)  BP: 120/70 (01-15-22 @ 12:00)  BP(mean): --  RR: 18 (01-15-22 @ 12:00) (16 - 19)  SpO2: 97% (01-15-22 @ 12:00) (95% - 97%)  Wt(kg): --    General:  Well appearing, NAD, cachetic  HEENT:  Nonicteric, PERRLA  CV:  RRR, no murmur, no JVD  Lungs:  CTA B/L, no wheezes, rales, rhonchi  Abdomen:  Soft, non-tender, no distended, positive BS  Extremities:  2+ pulses, no c/c, no edema  Skin:  Warm and dry, no rashes  :  No mckeon  Neuro:  Alert, non-focal, CN II-XII grossly intact  No Restraints    LABS:    01-15    141  |  102  |  17  ----------------------------<  106<H>  4.0   |  30  |  0.59    Ca    9.2      15 Noble 2022 07:11  Phos  3.2     01-15  Mg     2.20     01-15                RADIOLOGY & ADDITIONAL TESTS:    Imaging Personally Reviewed:    Consultant(s) Notes Reviewed:      Care Discussed with Consultants/Other Providers:

## 2022-01-15 NOTE — PROGRESS NOTE ADULT - PROBLEM SELECTOR PLAN 3
Concern for worsening dementia with behavioral disturbances based on collateral provided by pt's son.  Robert body Dementia ?  - CT head negative for acute pathology  - According to ISTOP (Reference #: [id number]), pt has been regularly prescribed Xanax 0.5 mg qid PRN by her PCP and was last dispensed on 11/30/21. Per patient she has been taking 2 tabs in AM and 2 tabs in PM for years  - Frequent re-orientation  - Ensure proper sleep-wake cycle  - xanax tapered off as recommended by psych but had been on for years, concerned for any component of BZD withdrawal... - d/w psych c/w xanax PRN anxiety , added Haldol PRN for paranoia  - d/w psych resumed standing BZDs as concerned re BZD withdrawal - c/w Xanax 0.25 bid, give Ativan 0.5 if she doesn't take the Xanax  -confused and encephalopathic, given a dose of iv thiamine, c/w po thiamine

## 2022-01-16 LAB
ANION GAP SERPL CALC-SCNC: 9 MMOL/L — SIGNIFICANT CHANGE UP (ref 7–14)
BUN SERPL-MCNC: 15 MG/DL — SIGNIFICANT CHANGE UP (ref 7–23)
CALCIUM SERPL-MCNC: 9.4 MG/DL — SIGNIFICANT CHANGE UP (ref 8.4–10.5)
CHLORIDE SERPL-SCNC: 101 MMOL/L — SIGNIFICANT CHANGE UP (ref 98–107)
CO2 SERPL-SCNC: 29 MMOL/L — SIGNIFICANT CHANGE UP (ref 22–31)
CREAT SERPL-MCNC: 0.6 MG/DL — SIGNIFICANT CHANGE UP (ref 0.5–1.3)
GLUCOSE SERPL-MCNC: 113 MG/DL — HIGH (ref 70–99)
HCT VFR BLD CALC: 39 % — SIGNIFICANT CHANGE UP (ref 34.5–45)
HGB BLD-MCNC: 12.1 G/DL — SIGNIFICANT CHANGE UP (ref 11.5–15.5)
MAGNESIUM SERPL-MCNC: 2.1 MG/DL — SIGNIFICANT CHANGE UP (ref 1.6–2.6)
MCHC RBC-ENTMCNC: 28.7 PG — SIGNIFICANT CHANGE UP (ref 27–34)
MCHC RBC-ENTMCNC: 31 GM/DL — LOW (ref 32–36)
MCV RBC AUTO: 92.4 FL — SIGNIFICANT CHANGE UP (ref 80–100)
NRBC # BLD: 0 /100 WBCS — SIGNIFICANT CHANGE UP
NRBC # FLD: 0 K/UL — SIGNIFICANT CHANGE UP
PHOSPHATE SERPL-MCNC: 3.5 MG/DL — SIGNIFICANT CHANGE UP (ref 2.5–4.5)
PLATELET # BLD AUTO: 309 K/UL — SIGNIFICANT CHANGE UP (ref 150–400)
POTASSIUM SERPL-MCNC: 4.2 MMOL/L — SIGNIFICANT CHANGE UP (ref 3.5–5.3)
POTASSIUM SERPL-SCNC: 4.2 MMOL/L — SIGNIFICANT CHANGE UP (ref 3.5–5.3)
RBC # BLD: 4.22 M/UL — SIGNIFICANT CHANGE UP (ref 3.8–5.2)
RBC # FLD: 13.4 % — SIGNIFICANT CHANGE UP (ref 10.3–14.5)
SODIUM SERPL-SCNC: 139 MMOL/L — SIGNIFICANT CHANGE UP (ref 135–145)
WBC # BLD: 8.18 K/UL — SIGNIFICANT CHANGE UP (ref 3.8–10.5)
WBC # FLD AUTO: 8.18 K/UL — SIGNIFICANT CHANGE UP (ref 3.8–10.5)

## 2022-01-16 PROCEDURE — 99232 SBSQ HOSP IP/OBS MODERATE 35: CPT

## 2022-01-16 RX ORDER — ONDANSETRON 8 MG/1
4 TABLET, FILM COATED ORAL ONCE
Refills: 0 | Status: DISCONTINUED | OUTPATIENT
Start: 2022-01-16 | End: 2022-01-17

## 2022-01-16 RX ADMIN — SERTRALINE 100 MILLIGRAM(S): 25 TABLET, FILM COATED ORAL at 11:26

## 2022-01-16 RX ADMIN — ENOXAPARIN SODIUM 40 MILLIGRAM(S): 100 INJECTION SUBCUTANEOUS at 11:26

## 2022-01-16 RX ADMIN — Medication 125 MILLIGRAM(S): at 11:25

## 2022-01-16 RX ADMIN — MIRTAZAPINE 15 MILLIGRAM(S): 45 TABLET, ORALLY DISINTEGRATING ORAL at 22:45

## 2022-01-16 RX ADMIN — Medication 100 MILLIGRAM(S): at 11:26

## 2022-01-16 RX ADMIN — Medication 50 MICROGRAM(S): at 06:03

## 2022-01-16 RX ADMIN — Medication 125 MILLIGRAM(S): at 01:06

## 2022-01-16 RX ADMIN — Medication 1 TABLET(S): at 22:45

## 2022-01-16 RX ADMIN — ATORVASTATIN CALCIUM 20 MILLIGRAM(S): 80 TABLET, FILM COATED ORAL at 22:45

## 2022-01-16 RX ADMIN — Medication 125 MILLIGRAM(S): at 06:02

## 2022-01-16 RX ADMIN — Medication 1 TABLET(S): at 06:02

## 2022-01-16 NOTE — PROGRESS NOTE ADULT - SUBJECTIVE AND OBJECTIVE BOX
Patient is a 89y old  Female who presents with a chief complaint of Fall, dementia (15 Noble 2022 13:35)      SUBJECTIVE / OVERNIGHT EVENTS:    Patient seen and examined at bedside. Resting in bed, frustrated w/ being in hospital, reports having loose bowel movements, minimal po intake.     MEDICATIONS  (STANDING):  ALPRAZolam 0.25 milliGRAM(s) Oral <User Schedule>  atorvastatin 20 milliGRAM(s) Oral at bedtime  enoxaparin Injectable 40 milliGRAM(s) SubCutaneous daily  lactobacillus acidophilus 1 Tablet(s) Oral three times a day  levothyroxine 50 MICROGram(s) Oral daily  mirtazapine 15 milliGRAM(s) Oral at bedtime  ondansetron Injectable 4 milliGRAM(s) IV Push once  sertraline 100 milliGRAM(s) Oral daily  thiamine 100 milliGRAM(s) Oral daily  vancomycin    Solution 125 milliGRAM(s) Oral every 6 hours    MEDICATIONS  (PRN):  acetaminophen     Tablet .. 650 milliGRAM(s) Oral every 6 hours PRN Temp greater or equal to 38C (100.4F), Moderate Pain (4 - 6)  artificial  tears Solution 1 Drop(s) Both EYES daily PRN Dry Eyes      Vital Signs Last 24 Hrs  T(C): 36.7 (16 Jan 2022 11:22), Max: 37.1 (15 Noble 2022 22:10)  T(F): 98.1 (16 Jan 2022 11:22), Max: 98.8 (15 Noble 2022 22:10)  HR: 77 (16 Jan 2022 11:22) (64 - 78)  BP: 122/63 (16 Jan 2022 11:22) (122/63 - 148/54)  BP(mean): --  RR: 18 (16 Jan 2022 11:22) (18 - 18)  SpO2: 98% (16 Jan 2022 11:22) (96% - 98%)  CAPILLARY BLOOD GLUCOSE        I&O's Summary      PHYSICAL EXAM:  Vital Signs Last 24 Hrs  T(C): 36.7 (01-16-22 @ 11:22)  T(F): 98.1 (01-16-22 @ 11:22), Max: 98.8 (01-15-22 @ 22:10)  HR: 77 (01-16-22 @ 11:22) (64 - 78)  BP: 122/63 (01-16-22 @ 11:22)  BP(mean): --  RR: 18 (01-16-22 @ 11:22) (18 - 18)  SpO2: 98% (01-16-22 @ 11:22) (96% - 98%)  Wt(kg): --    Constitutional: NAD, awake and alert  EYES: EOMI  ENT:  Normal Hearing   Respiratory: Breath sounds are clear bilaterally, No wheezing, rales or rhonchi  Cardiovascular: S1 and S2, regular rate and rhythm, no Murmurs, gallops or rubs, no JVD,    Gastrointestinal: Bowel Sounds present, soft, nontender, nondistended, no guarding, no rebound  Extremities: No cyanosis or clubbing; warm to touch  Vascular: 2+ peripheral pulses lower ex  Neurological: No focal deficits, CN II-XII intact bilaterally, sensation to light touch intact in all extremities. Pupils are equally reactive to light and symmetrical in size.   Musculoskeletal: 5/5 strength b/l upper and lower extremities; no joint swelling.  Skin: No rashes  Psych: no depression or anhedonia, AAOx2  HEME: no bruises, no nose bleeds      LABS:                        12.1   8.18  )-----------( 309      ( 16 Jan 2022 07:20 )             39.0     01-16    139  |  101  |  15  ----------------------------<  113<H>  4.2   |  29  |  0.60    Ca    9.4      16 Jan 2022 07:20  Phos  3.5     01-16  Mg     2.10     01-16                RADIOLOGY & ADDITIONAL TESTS:    Imaging Personally Reviewed:    Consultant(s) Notes Reviewed:      Care Discussed with Consultants/Other Providers:

## 2022-01-16 NOTE — PROGRESS NOTE ADULT - PROBLEM SELECTOR PLAN 5
Pt with fall at home, likely mechanical in nature in setting of debility/worsening functional status 2/2 dementia  - CK elevated to 331, likely from fall  - Mental status - AAO to self and place. Agitated at times.   - CT head and C-spine negative for acute pathology, C-collar cleared in ED  - EKG showed ST depression in lateral leads I and aVL, QTc 420 ms, hST 28-->34, chest pain free  - normal orthostatics  - PT consult - [facility] (last seen 1/11)  - Fall precautions, seizure precautions, bed alarm

## 2022-01-16 NOTE — PROGRESS NOTE ADULT - PROBLEM SELECTOR PLAN 3
Concern for worsening dementia with behavioral disturbances based on collateral provided by pt's son.  Robert body Dementia ?  - CT head negative for acute pathology  - According to ISTOP (Reference #: [id number]), pt has been regularly prescribed Xanax 0.5 mg qid PRN by her PCP and was last dispensed on 11/30/21. Per patient she has been taking 2 tabs in AM and 2 tabs in PM for years  - Frequent re-orientation  - Ensure proper sleep-wake cycle  - xanax tapered off as recommended by psych but had been on for years, concerned for any component of BZD withdrawal... - d/w psych c/w xanax PRN anxiety , added Haldol PRN for paranoia  - d/w psych resumed standing BZDs as concerned re BZD withdrawal - c/w Xanax 0.25 bid, give Ativan 0.5 if she doesn't take the Xanax  -confused and encephalopathic, given a dose of iv thiamine, c/w po thiamine

## 2022-01-16 NOTE — PROGRESS NOTE ADULT - PROBLEM SELECTOR PLAN 1
Having diarrhea again since 1/12, sample for C.diff not collected  Contact isolation  C/w vanco PO for now given high suspicion for C.Diff Infection-Day 4/10  Clinically improving as less frequent BM's, 1 loose stool this AM  Off bowel regimen  C/w probiotics for now  Dispo: PT rec [facility] , covid swab (last neg on 1/14), awaiting isolation bed at [facility]-possibly on Monday 1/17

## 2022-01-17 VITALS
TEMPERATURE: 98 F | SYSTOLIC BLOOD PRESSURE: 131 MMHG | RESPIRATION RATE: 17 BRPM | DIASTOLIC BLOOD PRESSURE: 61 MMHG | HEART RATE: 76 BPM | OXYGEN SATURATION: 97 %

## 2022-01-17 LAB
ANION GAP SERPL CALC-SCNC: 9 MMOL/L — SIGNIFICANT CHANGE UP (ref 7–14)
BUN SERPL-MCNC: 13 MG/DL — SIGNIFICANT CHANGE UP (ref 7–23)
CALCIUM SERPL-MCNC: 9.4 MG/DL — SIGNIFICANT CHANGE UP (ref 8.4–10.5)
CHLORIDE SERPL-SCNC: 101 MMOL/L — SIGNIFICANT CHANGE UP (ref 98–107)
CO2 SERPL-SCNC: 28 MMOL/L — SIGNIFICANT CHANGE UP (ref 22–31)
CREAT SERPL-MCNC: 0.64 MG/DL — SIGNIFICANT CHANGE UP (ref 0.5–1.3)
GLUCOSE SERPL-MCNC: 104 MG/DL — HIGH (ref 70–99)
HCT VFR BLD CALC: 36.7 % — SIGNIFICANT CHANGE UP (ref 34.5–45)
HGB BLD-MCNC: 11.8 G/DL — SIGNIFICANT CHANGE UP (ref 11.5–15.5)
MAGNESIUM SERPL-MCNC: 2.1 MG/DL — SIGNIFICANT CHANGE UP (ref 1.6–2.6)
MCHC RBC-ENTMCNC: 29.4 PG — SIGNIFICANT CHANGE UP (ref 27–34)
MCHC RBC-ENTMCNC: 32.2 GM/DL — SIGNIFICANT CHANGE UP (ref 32–36)
MCV RBC AUTO: 91.5 FL — SIGNIFICANT CHANGE UP (ref 80–100)
NRBC # BLD: 0 /100 WBCS — SIGNIFICANT CHANGE UP
NRBC # FLD: 0 K/UL — SIGNIFICANT CHANGE UP
PHOSPHATE SERPL-MCNC: 3.5 MG/DL — SIGNIFICANT CHANGE UP (ref 2.5–4.5)
PLATELET # BLD AUTO: 325 K/UL — SIGNIFICANT CHANGE UP (ref 150–400)
POTASSIUM SERPL-MCNC: 4.1 MMOL/L — SIGNIFICANT CHANGE UP (ref 3.5–5.3)
POTASSIUM SERPL-SCNC: 4.1 MMOL/L — SIGNIFICANT CHANGE UP (ref 3.5–5.3)
RBC # BLD: 4.01 M/UL — SIGNIFICANT CHANGE UP (ref 3.8–5.2)
RBC # FLD: 13.4 % — SIGNIFICANT CHANGE UP (ref 10.3–14.5)
SARS-COV-2 RNA SPEC QL NAA+PROBE: SIGNIFICANT CHANGE UP
SODIUM SERPL-SCNC: 138 MMOL/L — SIGNIFICANT CHANGE UP (ref 135–145)
WBC # BLD: 8.47 K/UL — SIGNIFICANT CHANGE UP (ref 3.8–10.5)
WBC # FLD AUTO: 8.47 K/UL — SIGNIFICANT CHANGE UP (ref 3.8–10.5)

## 2022-01-17 PROCEDURE — 99239 HOSP IP/OBS DSCHRG MGMT >30: CPT

## 2022-01-17 RX ORDER — VANCOMYCIN HCL 1 G
12.5 VIAL (EA) INTRAVENOUS
Qty: 300 | Refills: 0
Start: 2022-01-17 | End: 2022-01-22

## 2022-01-17 RX ADMIN — Medication 1 TABLET(S): at 06:30

## 2022-01-17 RX ADMIN — Medication 125 MILLIGRAM(S): at 06:32

## 2022-01-17 RX ADMIN — ENOXAPARIN SODIUM 40 MILLIGRAM(S): 100 INJECTION SUBCUTANEOUS at 11:20

## 2022-01-17 RX ADMIN — Medication 100 MILLIGRAM(S): at 11:20

## 2022-01-17 RX ADMIN — Medication 125 MILLIGRAM(S): at 11:23

## 2022-01-17 RX ADMIN — Medication 125 MILLIGRAM(S): at 00:13

## 2022-01-17 RX ADMIN — SERTRALINE 100 MILLIGRAM(S): 25 TABLET, FILM COATED ORAL at 11:20

## 2022-01-17 RX ADMIN — Medication 50 MICROGRAM(S): at 06:30

## 2022-01-17 NOTE — PROGRESS NOTE ADULT - REASON FOR ADMISSION
Fall, dementia

## 2022-01-17 NOTE — PROGRESS NOTE ADULT - PROBLEM SELECTOR PLAN 3
Concern for worsening dementia with behavioral disturbances based on collateral provided by pt's son.  Robert body Dementia ?  - CT head negative for acute pathology  - According to ISTOP (Reference #: [id number]), pt has been regularly prescribed Xanax 0.5 mg qid PRN by her PCP and was last dispensed on 11/30/21. Per patient she has been taking 2 tabs in AM and 2 tabs in PM for years  - Frequent re-orientation  - Ensure proper sleep-wake cycle  - xanax tapered off as recommended by psych but had been on for years, concerned for any component of BZD withdrawal... - d/w psych c/w xanax PRN anxiety , added Haldol PRN for paranoia  - d/w psych resumed standing BZDs as concerned re BZD withdrawal - c/w Xanax 0.25 bid, give Ativan 0.5 if she doesn't take the Xanax  -confused and encephalopathic, given a dose of iv thiamine, c/w po thiamine

## 2022-01-17 NOTE — PROGRESS NOTE ADULT - SUBJECTIVE AND OBJECTIVE BOX
Patient is a 89y old  Female who presents with a chief complaint of Fall, dementia (16 Jan 2022 12:52)      SUBJECTIVE / OVERNIGHT EVENTS:    Patient seen and examined at bedside. No dyspnea/CP. Frustrated, wants to go home, remains afebrile, reports loose BM    MEDICATIONS  (STANDING):  ALPRAZolam 0.25 milliGRAM(s) Oral <User Schedule>  atorvastatin 20 milliGRAM(s) Oral at bedtime  enoxaparin Injectable 40 milliGRAM(s) SubCutaneous daily  lactobacillus acidophilus 1 Tablet(s) Oral three times a day  levothyroxine 50 MICROGram(s) Oral daily  mirtazapine 15 milliGRAM(s) Oral at bedtime  ondansetron Injectable 4 milliGRAM(s) IV Push once  sertraline 100 milliGRAM(s) Oral daily  thiamine 100 milliGRAM(s) Oral daily  vancomycin    Solution 125 milliGRAM(s) Oral every 6 hours    MEDICATIONS  (PRN):  acetaminophen     Tablet .. 650 milliGRAM(s) Oral every 6 hours PRN Temp greater or equal to 38C (100.4F), Moderate Pain (4 - 6)  artificial  tears Solution 1 Drop(s) Both EYES daily PRN Dry Eyes      Vital Signs Last 24 Hrs  T(C): 36.5 (17 Jan 2022 10:54), Max: 36.7 (16 Jan 2022 22:35)  T(F): 97.7 (17 Jan 2022 10:54), Max: 98.1 (16 Jan 2022 22:35)  HR: 76 (17 Jan 2022 10:54) (70 - 76)  BP: 131/61 (17 Jan 2022 10:54) (131/61 - 144/58)  BP(mean): --  RR: 17 (17 Jan 2022 10:54) (17 - 18)  SpO2: 97% (17 Jan 2022 10:54) (94% - 97%)  CAPILLARY BLOOD GLUCOSE        I&O's Summary      PHYSICAL EXAM:  Vital Signs Last 24 Hrs  T(C): 36.5 (01-17-22 @ 10:54)  T(F): 97.7 (01-17-22 @ 10:54), Max: 98.1 (01-16-22 @ 22:35)  HR: 76 (01-17-22 @ 10:54) (70 - 76)  BP: 131/61 (01-17-22 @ 10:54)  BP(mean): --  RR: 17 (01-17-22 @ 10:54) (17 - 18)  SpO2: 97% (01-17-22 @ 10:54) (94% - 97%)  Wt(kg): --    Constitutional: NAD, awake and alert  EYES: EOMI  Respiratory: Breath sounds are clear bilaterally, No wheezing, rales or rhonchi  Cardiovascular: S1 and S2, regular rate and rhythm, no Murmurs, gallops or rubs, no JVD,    Gastrointestinal: Bowel Sounds present, soft, nontender, nondistended, no guarding, no rebound  Extremities: No cyanosis or clubbing; warm to touch  Vascular: 2+ peripheral pulses lower ex  Neurological: No focal deficits, CN II-XII intact bilaterally, sensation to light touch intact in all extremities. Pupils are equally reactive to light and symmetrical in size.   Musculoskeletal: 5/5 strength b/l upper and lower extremities; no joint swelling.  Skin: No rashes  Psych: no depression or anhedonia, AAOx1  HEME: no bruises, no nose bleeds      LABS:                        11.8   8.47  )-----------( 325      ( 17 Jan 2022 07:41 )             36.7     01-17    138  |  101  |  13  ----------------------------<  104<H>  4.1   |  28  |  0.64    Ca    9.4      17 Jan 2022 07:41  Phos  3.5     01-17  Mg     2.10     01-17                RADIOLOGY & ADDITIONAL TESTS:    Imaging Personally Reviewed:    Consultant(s) Notes Reviewed:      Care Discussed with Consultants/Other Providers:

## 2022-01-17 NOTE — PROGRESS NOTE ADULT - PROBLEM SELECTOR PLAN 12
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  - Not on blood thinners per son, no ASA or Plavix  - Monitor for changes in upper extremities    C/w LMWH for DVT PPx  Awaiting isolation bed available at [facility], likely to happen on Monday 1/17  Repeat COVID swab on Sunday-1/16 (last neg on 1/14)

## 2022-01-17 NOTE — PROGRESS NOTE ADULT - PROBLEM SELECTOR PLAN 5
Pt with fall at home, likely mechanical in nature in setting of debility/worsening functional status 2/2 dementia  - CK elevated to 331, likely from fall  - Mental status - AAO to self and place. Agitated at times.   - CT head and C-spine negative for acute pathology, C-collar cleared in ED  - EKG showed ST depression in lateral leads I and aVL, QTc 420 ms, hST 28-->34, chest pain free  - normal orthostatics  - PT consult - [facility] (last seen 1/11)  - Fall precautions, seizure precautions, bed alarm

## 2022-01-17 NOTE — PROGRESS NOTE ADULT - PROBLEM SELECTOR PLAN 1
Having diarrhea again since 1/12, sample for C.diff not collected  Contact isolation  C/w vanco PO for now given high suspicion for C.Diff Infection-Day 5/10  Clinically improving as less frequent BM's, 1 loose stool this AM  Off bowel regimen  C/w probiotics for now  Dispo: PT rec [facility] , covid swab (last neg on 1/14), awaiting isolation bed at [facility]-possibly on Monday 1/17

## 2022-01-17 NOTE — PROGRESS NOTE ADULT - NUTRITIONAL ASSESSMENT
This patient has been assessed with a concern for Malnutrition and has been determined to have a diagnosis/diagnoses of Severe protein-calorie malnutrition.    This patient is being managed with:   Diet Soft and Bite Sized-  DASH/TLC {Sodium & Cholesterol Restricted} (DASH)  Supplement Feeding Modality:  Oral  Ensure Enlive Cans or Servings Per Day:  2       Frequency:  Two Times a day  Entered: [place]  3 2022  5:49PM    

## 2022-01-17 NOTE — PROGRESS NOTE ADULT - PROBLEM SELECTOR PROBLEM 12
Takayasu's arteritis

## 2022-01-18 RX ORDER — AMLODIPINE BESYLATE 2.5 MG/1
1 TABLET ORAL
Qty: 0 | Refills: 0 | DISCHARGE

## 2022-01-18 RX ORDER — MELOXICAM 15 MG/1
1 TABLET ORAL
Qty: 0 | Refills: 0 | DISCHARGE

## 2022-01-18 RX ORDER — LEVOTHYROXINE SODIUM 125 MCG
1 TABLET ORAL
Qty: 0 | Refills: 0 | DISCHARGE

## 2022-01-18 RX ORDER — ATORVASTATIN CALCIUM 80 MG/1
1 TABLET, FILM COATED ORAL
Qty: 0 | Refills: 0 | DISCHARGE

## 2022-01-18 RX ORDER — HALOPERIDOL DECANOATE 100 MG/ML
1 INJECTION INTRAMUSCULAR
Qty: 0 | Refills: 0 | DISCHARGE

## 2022-01-18 RX ORDER — SERTRALINE 25 MG/1
1 TABLET, FILM COATED ORAL
Qty: 0 | Refills: 0 | DISCHARGE

## 2022-01-18 RX ORDER — MIRTAZAPINE 45 MG/1
1 TABLET, ORALLY DISINTEGRATING ORAL
Qty: 0 | Refills: 0 | DISCHARGE

## 2022-01-18 RX ORDER — PANTOPRAZOLE SODIUM 20 MG/1
1 TABLET, DELAYED RELEASE ORAL
Qty: 0 | Refills: 0 | DISCHARGE

## 2022-01-18 RX ORDER — ALPRAZOLAM 0.25 MG
1 TABLET ORAL
Qty: 0 | Refills: 0 | DISCHARGE

## 2022-02-15 NOTE — PROGRESS NOTE ADULT - SUBJECTIVE AND OBJECTIVE BOX
[facility] Division of Hospital Medicine  Gloria Chisholm MD  Pager (M-F, 8A-5P):  In-house pager [phone]; Long-range pager [phone]  Other Times:  Please page Hospitalist in Charge -  In-house pager [phone]    Patient is a 89y old  Female who presents with a chief complaint of Fall, dementia (2022 08:11)    SUBJECTIVE / OVERNIGHT EVENTS:  ...  ADDITIONAL REVIEW OF SYSTEMS:    MEDICATIONS  (STANDING):  ALPRAZolam 0.25 milliGRAM(s) Oral <User Schedule>  atorvastatin 20 milliGRAM(s) Oral at bedtime  enoxaparin Injectable 40 milliGRAM(s) SubCutaneous daily  levothyroxine 50 MICROGram(s) Oral daily  mirtazapine 15 milliGRAM(s) Oral at bedtime  polyethylene glycol 3350 17 Gram(s) Oral daily  senna 1 Tablet(s) Oral at bedtime  sertraline 100 milliGRAM(s) Oral daily    MEDICATIONS  (PRN):  acetaminophen     Tablet .. 650 milliGRAM(s) Oral every 6 hours PRN Temp greater or equal to 38C (100.4F), Moderate Pain (4 - 6)  artificial  tears Solution 1 Drop(s) Both EYES daily PRN Dry Eyes  haloperidol    Injectable 0.5 milliGRAM(s) IntraMuscular every 6 hours PRN paranoia  LORazepam   Injectable 0.5 milliGRAM(s) IV Push <User Schedule> PRN Not taking standing PO xanax  LORazepam   Injectable 0.5 milliGRAM(s) IntraMuscular <User Schedule> PRN Not taking standing PO Xanax    PHYSICAL EXAM:  Vital Signs Last 24 Hrs  T(C): 37.1 (2022 04:12), Max: 37.4 (2022 20:12)  T(F): 98.7 (2022 04:12), Max: 99.4 (2022 20:12)  HR: 70 (2022 04:12) (66 - 82)  BP: 148/88 (2022 04:12) (106/65 - 148/88)  BP(mean): --  RR: 18 (2022 04:12) (18 - 18)  SpO2: 96% (2022 04:12) (95% - 96%)    Gen: NAD; resting in bed. awake and alert.   Pulm: no respiratory distress; no accessory muscle use; CTA b/l; no wheezing; no crackles.   Cards: RRR, nl S1/S2; no obvious murmurs; no LE edema; no JVD  Abd: soft; NT on exam; +bs  Ext: no cyanosis; no joint effusion; no joint pain on palpation.  Skin: no rash; no cyanosis  PSYCH: paranoid, loud, anxious    LABS:                        12.0   9.37  )-----------( 360      ( 2022 06:53 )             38.3     01-05    142  |  105  |  25<H>  ----------------------------<  99  3.4<L>   |  23  |  0.73    Ca    9.6      2022 06:53  Phos  2.6     01-05  Mg     1.90     -05    Urinalysis Basic - ( 2022 02:57 )  Color: Light Orange / Appearance: Turbid / S.025 / pH: x  Gluc: x / Ketone: Trace  / Bili: Negative / Urobili: <2 mg/dL   Blood: x / Protein: 100 mg/dL / Nitrite: Positive   Leuk Esterase: Large / RBC: 5-10 /HPF / WBC >50 /HPF   Sq Epi: x / Non Sq Epi: 3 /HPF / Bacteria: Many    RADIOLOGY & ADDITIONAL TESTS:  Results Reviewed:   Imaging Personally Reviewed:  Electrocardiogram Personally Reviewed:    COORDINATION OF CARE:  Care Discussed with Consultants/Other Providers [Y/N]: RN, SW, CM, ACP re overall care   Prior or Outpatient Records Reviewed [Y/N]:   05-Oct-2021 06:13 [facility] Division of Hospital Medicine  Gloria Chisholm MD  Pager (M-F, 8A-5P):  In-house pager [phone]; Long-range pager [phone]  Other Times:  Please page Hospitalist in Charge -  In-house pager [phone]    Patient is a 89y old  Female who presents with a chief complaint of Fall, dementia (2022 08:11)    SUBJECTIVE / OVERNIGHT EVENTS:  Seen earlier with RN, +diarrhea, had 3 this morning.  Pt irritable.  Said had pain when urinate earlier.  ADDITIONAL REVIEW OF SYSTEMS:    MEDICATIONS  (STANDING):  ALPRAZolam 0.25 milliGRAM(s) Oral <User Schedule>  atorvastatin 20 milliGRAM(s) Oral at bedtime  enoxaparin Injectable 40 milliGRAM(s) SubCutaneous daily  levothyroxine 50 MICROGram(s) Oral daily  mirtazapine 15 milliGRAM(s) Oral at bedtime  polyethylene glycol 3350 17 Gram(s) Oral daily  senna 1 Tablet(s) Oral at bedtime  sertraline 100 milliGRAM(s) Oral daily    MEDICATIONS  (PRN):  acetaminophen     Tablet .. 650 milliGRAM(s) Oral every 6 hours PRN Temp greater or equal to 38C (100.4F), Moderate Pain (4 - 6)  artificial  tears Solution 1 Drop(s) Both EYES daily PRN Dry Eyes  haloperidol    Injectable 0.5 milliGRAM(s) IntraMuscular every 6 hours PRN paranoia  LORazepam   Injectable 0.5 milliGRAM(s) IV Push <User Schedule> PRN Not taking standing PO xanax  LORazepam   Injectable 0.5 milliGRAM(s) IntraMuscular <User Schedule> PRN Not taking standing PO Xanax    PHYSICAL EXAM:  Vital Signs Last 24 Hrs  T(C): 37.1 (2022 04:12), Max: 37.4 (2022 20:12)  T(F): 98.7 (2022 04:12), Max: 99.4 (2022 20:12)  HR: 70 (2022 04:12) (66 - 82)  BP: 148/88 (2022 04:12) (106/65 - 148/88)  BP(mean): --  RR: 18 (2022 04:12) (18 - 18)  SpO2: 96% (2022 04:12) (95% - 96%)    Gen: NAD; resting in bed. awake and alert.   Pulm: no respiratory distress; no accessory muscle use; CTA b/l; no wheezing; no crackles.   Cards: RRR, nl S1/S2; no obvious murmurs; no LE edema; no JVD  Abd: soft; NT on exam; +bs  Ext: no cyanosis; no joint effusion; no joint pain on palpation.  Skin: no rash; no cyanosis  PSYCH: paranoid, loud, anxious    LABS:                        12.0   9.37  )-----------( 360      ( 2022 06:53 )             38.3     01-05    142  |  105  |  25<H>  ----------------------------<  99  3.4<L>   |  23  |  0.73    Ca    9.6      2022 06:53  Phos  2.6     01-05  Mg     1.90     -05    Urinalysis Basic - ( 2022 02:57 )  Color: Light Orange / Appearance: Turbid / S.025 / pH: x  Gluc: x / Ketone: Trace  / Bili: Negative / Urobili: <2 mg/dL   Blood: x / Protein: 100 mg/dL / Nitrite: Positive   Leuk Esterase: Large / RBC: 5-10 /HPF / WBC >50 /HPF   Sq Epi: x / Non Sq Epi: 3 /HPF / Bacteria: Many    RADIOLOGY & ADDITIONAL TESTS:  Results Reviewed:   Imaging Personally Reviewed:  Electrocardiogram Personally Reviewed:    COORDINATION OF CARE:  Care Discussed with Consultants/Other Providers [Y/N]: RN, SW, CM, ACP re overall care   Prior or Outpatient Records Reviewed [Y/N]:   Tazorac Pregnancy And Lactation Text: This medication is not safe during pregnancy. It is unknown if this medication is excreted in breast milk.

## 2022-03-24 NOTE — H&P ADULT - NS MD HP PULSE CAROTID
Abdomen , soft, nontender, nondistended , no guarding or rigidity , no masses palpable , normal bowel sounds , Liver and Spleen , no hepatomegaly present , no hepatosplenomegaly , liver nontender , spleen not palpable right normal/left normal

## 2022-06-21 NOTE — H&P ADULT - SENSORY
What to do after your steroid shot:    Protect the injection area for a day or two. If the shot was in your knee or hip, refrain from excessive walking/exercising. If it was in your shoulder or elbow, do not lift heavy objects.   Apply ice to the injection site as needed to relieve pain  Gentle return to activity such as walking and stretching is recommended after this brief period of rest.    Side effects:  Lightening of the skin can occur at the injection site, particularly if you have darker skin  Redness and a feeling of warmth of the chest and face immediately after the shot  Increase in blood glucose (sugar) levels if you have diabetes    Results:  This depends on the reason for the treatment. The shots can cause a temporary flare in pain and inflammation for up to 48 hours after the injection. After that, your pain and inflammation of the affected area should decrease and this effect can last up to several months. If you have increasing pain, redness and swelling that last more than 48 hours, please call the office.    If the steroid injection wears off, it may be repeated in 3-4 months.      Sensation intact, non focal Sensation intact, non-focal

## 2022-06-27 NOTE — DISCHARGE NOTE PROVIDER - NSDCMRMEDTOKEN_GEN_ALL_CORE_FT
Resolved.   Most likely 2/2 to eliquis initiation and underlying GI pathology. s/p tagged scan with R. sided bleeding, s/p colonoscopy w/ poor prep and no active signs of bleeding. No active bleeding for one week.     Plan:  - changed pantoprazole to IV  - Maintain active type and screen   - Maintain 2 large bore IVs  - Transfuse to hemoglobin <7 --> s/p 7 PRBC transfusions, no bloody BM since colonoscopy  - GI following - notifed about hb drop and melena today (6/26) - rec NPO after mn for possible scope tomorrow (cancel NPO tomorrow if no planned scope per private GI).    - Trend CBC qd acetaminophen 325 mg oral tablet: 2 tab(s) orally every 6 hours, As needed, Temp greater or equal to 38C (100.4F), Moderate Pain (4 - 6)  ALPRAZolam 0.25 mg oral tablet: 1 tab(s) orally 2 times a day (9AM and 6PM)  atorvastatin 20 mg oral tablet: 1 tab(s) orally once a day (at bedtime)  lactobacillus acidophilus oral capsule: 1 cap(s) orally 3 times a day  levothyroxine 50 mcg (0.05 mg) oral tablet: 1 tab(s) orally once a day  mirtazapine 15 mg oral tablet: 1 tab(s) orally once a day (at bedtime)  ocular lubricant ophthalmic solution: 1 drop(s) to each affected eye once a day, As needed, Dry Eyes  polyethylene glycol 3350 oral powder for reconstitution: 17 gram(s) orally once a day  sertraline 100 mg oral tablet: 1 tab(s) orally once a day  thiamine 100 mg oral tablet: 1 tab(s) orally once a day   Resolved.   Most likely 2/2 to eliquis initiation and underlying GI pathology. s/p tagged scan with R. sided bleeding, s/p colonoscopy w/ poor prep and no active signs of bleeding. No active bleeding for one week.     Plan:  - changed pantoprazole to IV  - Maintain active type and screen   - Maintain 2 large bore IVs  - Transfuse to hemoglobin <7 --> s/p 7 PRBC transfusions, no bloody BM since colonoscopy  - GI following - notified about hb drop and melena today (6/26) - rec NPO after mn for possible scope tomorrow (cancel NPO tomorrow if no planned scope per private GI).    - Trend CBC qd acetaminophen 325 mg oral tablet: 2 tab(s) orally every 6 hours, As needed, Temp greater or equal to 38C (100.4F), Moderate Pain (4 - 6)  ALPRAZolam 0.25 mg oral tablet: 1 tab(s) orally 2 times a day (9AM and 6PM)  atorvastatin 20 mg oral tablet: 1 tab(s) orally once a day (at bedtime)  lactobacillus acidophilus oral capsule: 1 cap(s) orally 3 times a day  levothyroxine 50 mcg (0.05 mg) oral tablet: 1 tab(s) orally once a day  mirtazapine 15 mg oral tablet: 1 tab(s) orally once a day (at bedtime)  ocular lubricant ophthalmic solution: 1 drop(s) to each affected eye once a day, As needed, Dry Eyes  sertraline 100 mg oral tablet: 1 tab(s) orally once a day  thiamine 100 mg oral tablet: 1 tab(s) orally once a day

## 2023-03-09 NOTE — ED ADULT NURSE NOTE - NS ED NOTE ABUSE RESPONSE YN
Attempted to contact pt in regards to overdue HM unavailable left voicemail. Immunization's updated.  
Yes

## 2023-07-18 NOTE — SWALLOW BEDSIDE ASSESSMENT ADULT - SWALLOW EVAL: CURRENT DIET
Puree with mildly thick liquids, as tolerated
Puree with mildly thick liquids, per MD order
Otezla Counseling: The side effects of Otezla were discussed with the patient, including but not limited to worsening or new depression, weight loss, diarrhea, nausea, upper respiratory tract infection, and headache. Patient instructed to call the office should any adverse effect occur.  The patient verbalized understanding of the proper use and possible adverse effects of Otezla.  All the patient's questions and concerns were addressed.

## 2024-03-18 NOTE — PROGRESS NOTE ADULT - PROBLEM SELECTOR PLAN 11
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